# Patient Record
Sex: FEMALE | Race: WHITE | Employment: FULL TIME | URBAN - METROPOLITAN AREA
[De-identification: names, ages, dates, MRNs, and addresses within clinical notes are randomized per-mention and may not be internally consistent; named-entity substitution may affect disease eponyms.]

---

## 2017-09-11 ENCOUNTER — OFFICE VISIT (OUTPATIENT)
Dept: FAMILY MEDICINE CLINIC | Age: 52
End: 2017-09-11
Payer: MEDICARE

## 2017-09-11 VITALS
WEIGHT: 159 LBS | HEART RATE: 83 BPM | OXYGEN SATURATION: 95 % | BODY MASS INDEX: 26.49 KG/M2 | TEMPERATURE: 97.9 F | DIASTOLIC BLOOD PRESSURE: 71 MMHG | HEIGHT: 65 IN | SYSTOLIC BLOOD PRESSURE: 116 MMHG

## 2017-09-11 DIAGNOSIS — J06.9 UPPER RESPIRATORY TRACT INFECTION, UNSPECIFIED TYPE: Primary | ICD-10-CM

## 2017-09-11 DIAGNOSIS — R06.02 SHORTNESS OF BREATH: ICD-10-CM

## 2017-09-11 DIAGNOSIS — R05.9 COUGH: ICD-10-CM

## 2017-09-11 PROCEDURE — 99214 OFFICE O/P EST MOD 30 MIN: CPT | Performed by: FAMILY MEDICINE

## 2017-09-11 RX ORDER — PREDNISONE 50 MG/1
50 TABLET ORAL DAILY
Qty: 7 TABLET | Refills: 0 | Status: SHIPPED | OUTPATIENT
Start: 2017-09-11 | End: 2017-09-18

## 2017-09-11 RX ORDER — FLUTICASONE PROPIONATE 50 MCG
1 SPRAY, SUSPENSION (ML) NASAL 2 TIMES DAILY
Qty: 1 BOTTLE | Refills: 2 | Status: SHIPPED | OUTPATIENT
Start: 2017-09-11 | End: 2019-01-24 | Stop reason: SDUPTHER

## 2017-09-11 RX ORDER — AZITHROMYCIN 250 MG/1
TABLET, FILM COATED ORAL
Qty: 6 TABLET | Refills: 0 | Status: SHIPPED | OUTPATIENT
Start: 2017-09-11 | End: 2017-10-18 | Stop reason: ALTCHOICE

## 2017-09-11 RX ORDER — BROMPHENIRAMINE MALEATE, PSEUDOEPHEDRINE HYDROCHLORIDE, AND DEXTROMETHORPHAN HYDROBROMIDE 2; 30; 10 MG/5ML; MG/5ML; MG/5ML
5 SYRUP ORAL 3 TIMES DAILY
Qty: 180 ML | Refills: 0 | Status: SHIPPED | OUTPATIENT
Start: 2017-09-11 | End: 2017-10-18 | Stop reason: ALTCHOICE

## 2017-09-11 RX ORDER — ALBUTEROL SULFATE 90 UG/1
2 AEROSOL, METERED RESPIRATORY (INHALATION) EVERY 6 HOURS PRN
Qty: 1 INHALER | Refills: 3 | Status: SHIPPED | OUTPATIENT
Start: 2017-09-11 | End: 2018-08-23 | Stop reason: SDUPTHER

## 2017-09-11 ASSESSMENT — ENCOUNTER SYMPTOMS
EYES NEGATIVE: 1
GASTROINTESTINAL NEGATIVE: 1
SHORTNESS OF BREATH: 1
SORE THROAT: 1
SWOLLEN GLANDS: 1
ALLERGIC/IMMUNOLOGIC NEGATIVE: 1
COUGH: 1
SINUS PRESSURE: 1
WHEEZING: 1
RHINORRHEA: 1
TROUBLE SWALLOWING: 0

## 2017-10-14 ENCOUNTER — APPOINTMENT (OUTPATIENT)
Dept: GENERAL RADIOLOGY | Age: 52
End: 2017-10-14
Payer: MEDICARE

## 2017-10-14 ENCOUNTER — HOSPITAL ENCOUNTER (EMERGENCY)
Age: 52
Discharge: HOME | End: 2017-10-14
Attending: EMERGENCY MEDICINE
Payer: MEDICARE

## 2017-10-14 VITALS
OXYGEN SATURATION: 95 % | WEIGHT: 165 LBS | RESPIRATION RATE: 16 BRPM | SYSTOLIC BLOOD PRESSURE: 137 MMHG | DIASTOLIC BLOOD PRESSURE: 79 MMHG | TEMPERATURE: 97.7 F | HEART RATE: 83 BPM | HEIGHT: 65 IN | BODY MASS INDEX: 27.49 KG/M2

## 2017-10-14 DIAGNOSIS — S51.851A DOG BITE OF RIGHT FOREARM, INITIAL ENCOUNTER: Primary | ICD-10-CM

## 2017-10-14 DIAGNOSIS — F17.200 SMOKING ADDICTION: ICD-10-CM

## 2017-10-14 DIAGNOSIS — S51.831A: ICD-10-CM

## 2017-10-14 DIAGNOSIS — S51.811A LACERATION OF RIGHT FOREARM, INITIAL ENCOUNTER: ICD-10-CM
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
## 2017-10-14 PROCEDURE — 73090 X-RAY EXAM OF FOREARM: CPT

## 2017-10-14 PROCEDURE — 6370000000 HC RX 637 (ALT 250 FOR IP): Performed by: EMERGENCY MEDICINE

## 2017-10-14 PROCEDURE — 12013 RPR F/E/E/N/L/M 2.6-5.0 CM: CPT

## 2017-10-14 PROCEDURE — 6360000002 HC RX W HCPCS: Performed by: EMERGENCY MEDICINE

## 2017-10-14 PROCEDURE — 90471 IMMUNIZATION ADMIN: CPT | Performed by: EMERGENCY MEDICINE

## 2017-10-14 PROCEDURE — 2500000003 HC RX 250 WO HCPCS: Performed by: EMERGENCY MEDICINE

## 2017-10-14 PROCEDURE — 99283 EMERGENCY DEPT VISIT LOW MDM: CPT

## 2017-10-14 PROCEDURE — 90715 TDAP VACCINE 7 YRS/> IM: CPT | Performed by: EMERGENCY MEDICINE

## 2017-10-14 RX ORDER — LIDOCAINE HYDROCHLORIDE 10 MG/ML
20 INJECTION, SOLUTION INFILTRATION; PERINEURAL ONCE
Status: COMPLETED | OUTPATIENT
Start: 2017-10-14 | End: 2017-10-14

## 2017-10-14 RX ORDER — AMOXICILLIN AND CLAVULANATE POTASSIUM 875; 125 MG/1; MG/1
1 TABLET, FILM COATED ORAL ONCE
Status: COMPLETED | OUTPATIENT
Start: 2017-10-14 | End: 2017-10-14

## 2017-10-14 RX ORDER — AMOXICILLIN AND CLAVULANATE POTASSIUM 875; 125 MG/1; MG/1
1 TABLET, FILM COATED ORAL 2 TIMES DAILY
Qty: 20 TABLET | Refills: 0 | Status: SHIPPED | OUTPATIENT
Start: 2017-10-14 | End: 2017-10-24

## 2017-10-14 RX ORDER — IBUPROFEN 800 MG/1
800 TABLET ORAL ONCE
Status: COMPLETED | OUTPATIENT
Start: 2017-10-14 | End: 2017-10-14

## 2017-10-14 RX ORDER — IBUPROFEN 600 MG/1
600 TABLET ORAL EVERY 6 HOURS PRN
Qty: 30 TABLET | Refills: 0 | Status: SHIPPED | OUTPATIENT
Start: 2017-10-14 | End: 2018-07-31 | Stop reason: ALTCHOICE

## 2017-10-14 RX ADMIN — CLOSTRIDIUM TETANI TOXOID ANTIGEN (FORMALDEHYDE INACTIVATED), CORYNEBACTERIUM DIPHTHERIAE TOXOID ANTIGEN (FORMALDEHYDE INACTIVATED), BORDETELLA PERTUSSIS TOXOID ANTIGEN (GLUTARALDEHYDE INACTIVATED), BORDETELLA PERTUSSIS FILAMENTOUS HEMAGGLUTININ ANTIGEN (FORMALDEHYDE INACTIVATED), BORDETELLA PERTUSSIS PERTACTIN ANTIGEN, AND BORDETELLA PERTUSSIS FIMBRIAE 2/3 ANTIGEN 0.5 ML: 5; 2; 2.5; 5; 3; 5 INJECTION, SUSPENSION INTRAMUSCULAR at 10:19

## 2017-10-14 RX ADMIN — LIDOCAINE HYDROCHLORIDE 20 ML: 10 INJECTION, SOLUTION INFILTRATION; PERINEURAL at 09:30

## 2017-10-14 RX ADMIN — AMOXICILLIN AND CLAVULANATE POTASSIUM 1 TABLET: 875; 125 TABLET, FILM COATED ORAL at 09:29

## 2017-10-14 RX ADMIN — IBUPROFEN 800 MG: 800 TABLET ORAL at 09:28

## 2017-10-14 ASSESSMENT — ENCOUNTER SYMPTOMS
DIARRHEA: 0
COUGH: 0
BACK PAIN: 0
ABDOMINAL PAIN: 0
SHORTNESS OF BREATH: 0
VOMITING: 0
NAUSEA: 0
SORE THROAT: 0
EYE PAIN: 0

## 2017-10-14 ASSESSMENT — PAIN SCALES - GENERAL
PAINLEVEL_OUTOF10: 3

## 2017-10-14 ASSESSMENT — PAIN DESCRIPTION - ORIENTATION: ORIENTATION: RIGHT

## 2017-10-14 ASSESSMENT — PAIN DESCRIPTION - DESCRIPTORS: DESCRIPTORS: BURNING

## 2017-10-14 ASSESSMENT — PAIN DESCRIPTION - PAIN TYPE: TYPE: ACUTE PAIN

## 2017-10-14 ASSESSMENT — PAIN DESCRIPTION - FREQUENCY: FREQUENCY: CONTINUOUS

## 2017-10-14 ASSESSMENT — PAIN DESCRIPTION - LOCATION: LOCATION: ARM

## 2017-10-14 NOTE — ED NOTES
Faxed animal bite form to [phone] form on chart. Applied triple abx ointment to puncture wounds and sutured lac. Applied gauze and wrapped .  Pt tolerated well     Mayra Alonso RN  10/14/17 4138

## 2017-10-14 NOTE — ED PROVIDER NOTES
[address]  eMERGENCY dEPARTMENT eNCOUnter      Pt Name: Ciarra Grover  MRN: [id number]  [place] 1965  Date of evaluation: 10/14/17      CHIEF COMPLAINT:  Chief Complaint   Patient presents with   [facility] dog       HISTORY OF PRESENT ILLNESS    Ciarra Grover is a 50 y.o. female who presents with Dog bite to right forearm causing puncture wound on the dorsal aspect laceration on the volar aspect and abrasions associated. The dog is patient's own dog and she states that dog's immunizations are up-to-date. She states that she cannot recall how recent her last tetanus shot was but perhaps in the last 10 years. Bleeding is controlled at time of exam.    Right forearm wounds, prior to arrival context is dog bite, quality as skin punctures and lacerations and abrasions, constant duration tract pressures modifying factor for bleeding, moderate severity. REVIEW OF SYSTEMS       Review of Systems   Constitutional: Negative for chills and fever. HENT: Negative for ear pain and sore throat. Eyes: Negative for pain and visual disturbance. Respiratory: Negative for cough and shortness of breath. Cardiovascular: Negative for chest pain. Gastrointestinal: Negative for abdominal pain, diarrhea, nausea and vomiting. Endocrine: Negative for polydipsia and polyuria. Genitourinary: Negative for dysuria, hematuria and vaginal discharge. Musculoskeletal: Negative for arthralgias and back pain. Skin: Negative for rash. Allergic/Immunologic: Negative for food allergies. Neurological: Negative for weakness, numbness and headaches. Hematological: Does not bruise/bleed easily. Psychiatric/Behavioral: Positive for suicidal ideas. Negative for self-injury. The patient is not nervous/anxious. PAST MEDICAL HISTORY   PMH:  has a past medical history of CKD (chronic kidney disease) stage 1, GFR 90 ml/min or greater; Depression;  Hypocalcemia; IBS (irritable bowel syndrome); and Proteinuria. Surgical History:  has a past surgical history that includes  section and Tubal ligation (Bilateral). Social History:  reports that she has been smoking Cigarettes. She has a 28.00 pack-year smoking history. She does not have any smokeless tobacco history on file. She reports that she does not drink alcohol or use drugs. Family History: Noncontributory at this time  Psychiatric History: Noncontributory at this time  Allergies:has No Known Allergies. PHYSICAL EXAM     INITIAL VITALS: /79   Pulse 83   Temp 97.7 °F (36.5 °C) (Oral)   Resp 16   Ht 5' 5\" (1.651 m)   Wt 165 lb (74.8 kg)   LMP 2016   SpO2 95%   BMI 27.46 kg/m²     Physical Exam   Constitutional: She is oriented to person, place, and time. She appears well-developed and well-nourished. HENT:   Head: Normocephalic and atraumatic. Right Ear: External ear normal.   Left Ear: External ear normal.   Nose: Nose normal.   Mouth/Throat: Oropharynx is clear and moist.   Eyes: Conjunctivae and EOM are normal. Pupils are equal, round, and reactive to light. Right eye exhibits no discharge. Left eye exhibits no discharge. Neck: Normal range of motion. Neck supple. No tracheal deviation present. Cardiovascular: Normal rate, regular rhythm, normal heart sounds and intact distal pulses. Exam reveals no gallop and no friction rub. No murmur heard. Pulmonary/Chest: Effort normal and breath sounds normal. No respiratory distress. She has no wheezes. She has no rales. She exhibits no tenderness. Abdominal: Soft. Bowel sounds are normal. She exhibits no distension and no mass. There is no tenderness. There is no rebound and no guarding. Musculoskeletal: Normal range of motion. She exhibits no edema or tenderness. Arms:  Laceration to right volar arm 3 centimeters in length, puncture wound to right dorsum of arm associated with abrasion.    Neurological: She is alert and oriented to person, place, and

## 2017-10-18 ENCOUNTER — OFFICE VISIT (OUTPATIENT)
Dept: FAMILY MEDICINE CLINIC | Age: 52
End: 2017-10-18
Payer: MEDICARE

## 2017-10-18 VITALS
DIASTOLIC BLOOD PRESSURE: 77 MMHG | TEMPERATURE: 98 F | RESPIRATION RATE: 16 BRPM | BODY MASS INDEX: 27.49 KG/M2 | OXYGEN SATURATION: 94 % | SYSTOLIC BLOOD PRESSURE: 122 MMHG | HEART RATE: 82 BPM | WEIGHT: 165 LBS | HEIGHT: 65 IN

## 2017-10-18 DIAGNOSIS — T81.30XA WOUND DEHISCENCE: ICD-10-CM
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
## 2017-10-18 PROCEDURE — 99214 OFFICE O/P EST MOD 30 MIN: CPT | Performed by: FAMILY MEDICINE

## 2017-10-18 PROCEDURE — 12001 RPR S/N/AX/GEN/TRNK 2.5CM/<: CPT | Performed by: FAMILY MEDICINE

## 2017-10-18 ASSESSMENT — ENCOUNTER SYMPTOMS
EYES NEGATIVE: 1
RESPIRATORY NEGATIVE: 1
GASTROINTESTINAL NEGATIVE: 1
ALLERGIC/IMMUNOLOGIC NEGATIVE: 1

## 2017-10-18 NOTE — PROGRESS NOTES
[facility] WALK-IN FAMILY MEDICINE   [address] [address]  Dept: [phone]  Dept Fax: [phone]    Abdirizak Anton is a 50 y.o. female who presents today for her medical conditions/complaints as noted below. Abdirizak Anton is c/o of   Chief Complaint   Patient presents with    Animal Bite     dog bite on saturday, had stitches put in at [facility], 2 have fallen out since then         HPI:     This patient is a 53-year-old white female who presents today with wound dehiscence from a dog bite. Patient states that 2 days ago sutures was placed in her right forearm. There are 3 sutures placed 2 of which came apart yesterday. Patient now has redness and inflammation around the wound and a yellow greenish drainage. She presents for evaluation and treatment of this wound. Patient was given ibuprofen and Augmentin. Her tetanus shots are up-to-date. The dog which bit her was her own and it was an unintentional/unprovoked bite. We will evaluate and treat.         No results found for: LABA1C          ( goal A1C is < 7)   No results found for: LABMICR  LDL Cholesterol (mg/dL)   Date Value   2016 300 (H)       (goal LDL is <100)   AST (U/L)   Date Value   2015 27     ALT (U/L)   Date Value   2015 20     BUN (mg/dL)   Date Value   2016 5 (L)     BP Readings from Last 3 Encounters:   10/18/17 122/77   10/14/17 137/79   17 116/71          (goal 120/80)    Past Medical History:   Diagnosis Date    CKD (chronic kidney disease) stage 1, GFR 90 ml/min or greater     Depression     Hypocalcemia     IBS (irritable bowel syndrome)     Proteinuria       Past Surgical History:   Procedure Laterality Date     SECTION      CS x 2    TUBAL LIGATION Bilateral        Family History   Problem Relation Age of Onset    Asthma Father     Cancer Father      colon    Diabetes Father     COPD Father     Heart Failure Father

## 2018-07-31 ENCOUNTER — OFFICE VISIT (OUTPATIENT)
Dept: FAMILY MEDICINE CLINIC | Age: 53
End: 2018-07-31
Payer: MEDICARE

## 2018-07-31 VITALS
WEIGHT: 155 LBS | RESPIRATION RATE: 16 BRPM | SYSTOLIC BLOOD PRESSURE: 107 MMHG | OXYGEN SATURATION: 98 % | TEMPERATURE: 98.2 F | HEART RATE: 80 BPM | BODY MASS INDEX: 28.52 KG/M2 | HEIGHT: 62 IN | DIASTOLIC BLOOD PRESSURE: 67 MMHG

## 2018-07-31 DIAGNOSIS — T30.0 BURN: Primary | ICD-10-CM

## 2018-07-31 DIAGNOSIS — E78.2 MIXED HYPERLIPIDEMIA: ICD-10-CM

## 2018-07-31 DIAGNOSIS — E07.9 SWELLING OF THYROID GLAND: ICD-10-CM

## 2018-07-31 PROCEDURE — 99213 OFFICE O/P EST LOW 20 MIN: CPT | Performed by: FAMILY MEDICINE

## 2018-07-31 PROCEDURE — 4004F PT TOBACCO SCREEN RCVD TLK: CPT | Performed by: FAMILY MEDICINE

## 2018-07-31 PROCEDURE — G8419 CALC BMI OUT NRM PARAM NOF/U: HCPCS | Performed by: FAMILY MEDICINE

## 2018-07-31 PROCEDURE — G8427 DOCREV CUR MEDS BY ELIG CLIN: HCPCS | Performed by: FAMILY MEDICINE

## 2018-07-31 PROCEDURE — 3017F COLORECTAL CA SCREEN DOC REV: CPT | Performed by: FAMILY MEDICINE

## 2018-07-31 RX ORDER — IBUPROFEN 800 MG/1
800 TABLET ORAL EVERY 8 HOURS PRN
Qty: 10 TABLET | Refills: 0 | Status: SHIPPED | OUTPATIENT
Start: 2018-07-31 | End: 2018-08-23

## 2018-07-31 RX ORDER — FLUCONAZOLE 150 MG/1
150 TABLET ORAL ONCE
Qty: 1 TABLET | Refills: 0 | Status: SHIPPED | OUTPATIENT
Start: 2018-07-31 | End: 2018-07-31

## 2018-07-31 RX ORDER — CEPHALEXIN 500 MG/1
500 CAPSULE ORAL 3 TIMES DAILY
Qty: 30 CAPSULE | Refills: 0 | Status: SHIPPED | OUTPATIENT
Start: 2018-07-31 | End: 2018-08-10

## 2018-07-31 ASSESSMENT — ENCOUNTER SYMPTOMS
ROS SKIN COMMENTS: RIGHT THIGH.
EYES NEGATIVE: 1
NAUSEA: 0
WHEEZING: 0
BACK PAIN: 0
COLOR CHANGE: 1
BURN: 1
CHEST TIGHTNESS: 0
DIARRHEA: 0
ABDOMINAL PAIN: 0
COUGH: 0
SHORTNESS OF BREATH: 0

## 2018-07-31 NOTE — PATIENT INSTRUCTIONS
of infection. If a blister breaks open by itself, blot up the liquid, and leave the skin that covered the blister. This helps protect the new skin. · If your doctor prescribed antibiotics, take them as directed. Do not stop taking them just because you feel better. You need to take the full course of antibiotics. For pain and itching  · Take pain medicines exactly as directed. ¨ If the doctor gave you a prescription medicine for pain, take it as prescribed. ¨ If you are not taking a prescription pain medicine, ask your doctor if you can take an over-the-counter medicine. · If the burn itches, try not to scratch it. Try an over-the-counter antihistamine such as diphenhydramine (Benadryl) or loratadine (Claritin). Read and follow all instructions on the label. When should you call for help? Call your doctor now or seek immediate medical care if:    · Your pain gets worse.     · You have symptoms of infection, such as:  ¨ Increased pain, swelling, warmth, or redness near the burn. ¨ Red streaks leading from the burn. ¨ Pus draining from the burn. ¨ A fever.    Watch closely for changes in your health, and be sure to contact your doctor if:    · You do not get better as expected. Where can you learn more? Go to [email or web address]://Modern Armory.[email or web address] org and sign in to your Bluetest account. Enter B672 in the [facility] box to learn more about \"Burns: Care Instructions. \"     If you do not have an account, please click on the \"Sign Up Now\" link. Current as of: March 20, 2017  Content Version: 11.6  © 7950-5609 [employer], [employer]. Care instructions adapted under license by [facility] ([facility]). If you have questions about a medical condition or this instruction, always ask your healthcare professional. [place] [address] any warranty or liability for your use of this information.        Patient Education            Current as of: March 20, 2017  Content Version: 11.6  © 3736-1103 Healthwise, Incorporated. Care instructions adapted under license by [facility] ([facility]). If you have questions about a medical condition or this instruction, always ask your healthcare professional. [address] any warranty or liability for your use of this information. Patient Education        High Cholesterol: Care Instructions  Your Care Instructions    Cholesterol is a type of fat in your blood. It is needed for many body functions, such as making new cells. Cholesterol is made by your body. It also comes from food you eat. High cholesterol means that you have too much of the fat in your blood. This raises your risk of a heart attack and stroke. LDL and HDL are part of your total cholesterol. LDL is the \"bad\" cholesterol. High LDL can raise your risk for heart disease, heart attack, and stroke. HDL is the \"good\" cholesterol. It helps clear bad cholesterol from the body. High HDL is linked with a lower risk of heart disease, heart attack, and stroke. Your cholesterol levels help your doctor find out your risk for having a heart attack or stroke. You and your doctor can talk about whether you need to lower your risk and what treatment is best for you. A heart-healthy lifestyle along with medicines can help lower your cholesterol and your risk. The way you choose to lower your risk will depend on how high your risk is for heart attack and stroke. It will also depend on how you feel about taking medicines. Follow-up care is a key part of your treatment and safety. Be sure to make and go to all appointments, and call your doctor if you are having problems. It's also a good idea to know your test results and keep a list of the medicines you take. How can you care for yourself at home? · Eat a variety of foods every day.  Good choices include fruits, vegetables, whole grains (like oatmeal), dried beans and peas, nuts and seeds, soy products (like tofu), and fat-free or low-fat dairy more?  Go to https://chpepiceweb.[email or web address] org and sign in to your Relationship Sciencet account. Enter J045 in the [facility] box to learn more about \"Thyroid Nodules: Care Instructions. \"     If you do not have an account, please click on the \"Sign Up Now\" link. Current as of: June 22, 2017  Content Version: 11.6  © 6637-3012 [employer]. Care instructions adapted under license by [facility] [employer] [facility] ([facility]). If you have questions about a medical condition or this instruction, always ask your healthcare professional. [address] any warranty or liability for your use of this information. Patient Education        Thyroid Gland: Anatomy Sketch    Current as of: May 12, 2017  Content Version: 11.6  © 7500-5742 [employer]. Care instructions adapted under license by [facility] [employer] [facility] ([facility]). If you have questions about a medical condition or this instruction, always ask your healthcare professional. [address] any warranty or liability for your use of this information. Patient Education            Current as of: October 5, 2017  Content Version: 11.6  © 2406-8136 [employer]. Care instructions adapted under license by [facility] ([facility]). If you have questions about a medical condition or this instruction, always ask your healthcare professional. [address] any warranty or liability for your use of this information. Please follow up here in 7-10 days after tests if not able to see your PCP.   PRACTICE NAME STREET  ADDRESS OFFICE  PHONE   [facility]                                                                       [address] [facility]                                                                        [address], [address]   [address] [place], [facility]   [address]   [address] [facility]                                                                                       [address] [facility], [address] [facility]  [address], [address]   [address]                                                                                [address] [place]  [address]  [place], [facility] [address]   [facility], [address]   General acute hospital                                                                       [address], [address] [facility]                                                                   [address] [facility], [address]   [facility]                                                                 [address] [facility], [address] [place] [address]                                                                [address]    [facility] [address] [place] [address], [address] [place] [address] [facility]                                                                         [address]   [facility], [address]   [facility]
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
## 2018-08-08 ENCOUNTER — HOSPITAL ENCOUNTER (OUTPATIENT)
Dept: ULTRASOUND IMAGING | Age: 53
Discharge: HOME | End: 2018-08-10
Payer: MEDICARE

## 2018-08-08 ENCOUNTER — HOSPITAL ENCOUNTER (OUTPATIENT)
Age: 53
Discharge: HOME | End: 2018-08-08
Payer: MEDICARE

## 2018-08-08 DIAGNOSIS — E07.9 SWELLING OF THYROID GLAND: ICD-10-CM

## 2018-08-08 LAB
ABSOLUTE EOS #: 0.2 K/UL (ref 0–0.4)
ABSOLUTE IMMATURE GRANULOCYTE: ABNORMAL K/UL (ref 0–0.3)
ABSOLUTE LYMPH #: 2.8 K/UL (ref 1–4.8)
ABSOLUTE MONO #: 0.6 K/UL (ref 0.1–1.3)
ANION GAP SERPL CALCULATED.3IONS-SCNC: 14 MMOL/L (ref 9–17)
BASOPHILS # BLD: 1 % (ref 0–2)
BASOPHILS ABSOLUTE: 0.1 K/UL (ref 0–0.2)
BUN BLDV-MCNC: 11 MG/DL (ref 6–20)
BUN/CREAT BLD: NORMAL (ref 9–20)
CALCIUM SERPL-MCNC: 9.2 MG/DL (ref 8.6–10.4)
CHLORIDE BLD-SCNC: 99 MMOL/L (ref 98–107)
CHOLESTEROL/HDL RATIO: 6.4
CHOLESTEROL: 303 MG/DL
CO2: 25 MMOL/L (ref 20–31)
CREAT SERPL-MCNC: 0.65 MG/DL (ref 0.5–0.9)
DIFFERENTIAL TYPE: ABNORMAL
EOSINOPHILS RELATIVE PERCENT: 3 % (ref 0–4)
GFR AFRICAN AMERICAN: >60 ML/MIN
GFR NON-AFRICAN AMERICAN: >60 ML/MIN
GFR SERPL CREATININE-BSD FRML MDRD: NORMAL ML/MIN/{1.73_M2}
GFR SERPL CREATININE-BSD FRML MDRD: NORMAL ML/MIN/{1.73_M2}
GLUCOSE BLD-MCNC: 95 MG/DL (ref 70–99)
HCT VFR BLD CALC: 47.7 % (ref 36–46)
HDLC SERPL-MCNC: 47 MG/DL
HEMOGLOBIN: 16.5 G/DL (ref 12–16)
IMMATURE GRANULOCYTES: ABNORMAL %
LDL CHOLESTEROL: 224 MG/DL (ref 0–130)
LYMPHOCYTES # BLD: 38 % (ref 24–44)
MCH RBC QN AUTO: 29.5 PG (ref 26–34)
MCHC RBC AUTO-ENTMCNC: 34.6 G/DL (ref 31–37)
MCV RBC AUTO: 85.3 FL (ref 80–100)
MONOCYTES # BLD: 8 % (ref 1–7)
NRBC AUTOMATED: ABNORMAL PER 100 WBC
PDW BLD-RTO: 14.4 % (ref 11.5–14.9)
PLATELET # BLD: 385 K/UL (ref 150–450)
PLATELET ESTIMATE: ABNORMAL
PMV BLD AUTO: 7.6 FL (ref 6–12)
POTASSIUM SERPL-SCNC: 4.8 MMOL/L (ref 3.7–5.3)
RBC # BLD: 5.59 M/UL (ref 4–5.2)
RBC # BLD: ABNORMAL 10*6/UL
SEG NEUTROPHILS: 50 % (ref 36–66)
SEGMENTED NEUTROPHILS ABSOLUTE COUNT: 3.8 K/UL (ref 1.3–9.1)
SODIUM BLD-SCNC: 138 MMOL/L (ref 135–144)
TRIGL SERPL-MCNC: 162 MG/DL
TSH SERPL DL<=0.05 MIU/L-ACNC: 0.64 MIU/L (ref 0.3–5)
VLDLC SERPL CALC-MCNC: ABNORMAL MG/DL (ref 1–30)
WBC # BLD: 7.5 K/UL (ref 3.5–11)
WBC # BLD: ABNORMAL 10*3/UL

## 2018-08-08 PROCEDURE — 36415 COLL VENOUS BLD VENIPUNCTURE: CPT

## 2018-08-08 PROCEDURE — 76536 US EXAM OF HEAD AND NECK: CPT

## 2018-08-08 PROCEDURE — 80061 LIPID PANEL: CPT

## 2018-08-08 PROCEDURE — 85025 COMPLETE CBC W/AUTO DIFF WBC: CPT

## 2018-08-08 PROCEDURE — 84443 ASSAY THYROID STIM HORMONE: CPT

## 2018-08-08 PROCEDURE — 80048 BASIC METABOLIC PNL TOTAL CA: CPT

## 2018-08-09 DIAGNOSIS — E04.1 THYROID NODULE: Primary | ICD-10-CM

## 2018-08-09 DIAGNOSIS — E78.5 HYPERLIPIDEMIA, UNSPECIFIED HYPERLIPIDEMIA TYPE: Primary | ICD-10-CM

## 2018-08-09 RX ORDER — ATORVASTATIN CALCIUM 20 MG/1
20 TABLET, FILM COATED ORAL DAILY
Qty: 15 TABLET | Refills: 0 | Status: SHIPPED | OUTPATIENT
Start: 2018-08-09 | End: 2018-08-13 | Stop reason: SDUPTHER

## 2018-08-13 ENCOUNTER — OFFICE VISIT (OUTPATIENT)
Dept: FAMILY MEDICINE CLINIC | Age: 53
End: 2018-08-13
Payer: MEDICARE

## 2018-08-13 VITALS
HEART RATE: 70 BPM | HEIGHT: 65 IN | SYSTOLIC BLOOD PRESSURE: 124 MMHG | OXYGEN SATURATION: 100 % | TEMPERATURE: 98 F | WEIGHT: 157.2 LBS | DIASTOLIC BLOOD PRESSURE: 64 MMHG | BODY MASS INDEX: 26.19 KG/M2

## 2018-08-13 DIAGNOSIS — Z87.891 PERSONAL HISTORY OF SMOKING: ICD-10-CM

## 2018-08-13 DIAGNOSIS — K58.1 IRRITABLE BOWEL SYNDROME WITH CONSTIPATION: ICD-10-CM

## 2018-08-13 DIAGNOSIS — J44.9 CHRONIC OBSTRUCTIVE PULMONARY DISEASE, UNSPECIFIED COPD TYPE (HCC): ICD-10-CM

## 2018-08-13 DIAGNOSIS — Z80.0 FAMILY HISTORY OF COLON CANCER: ICD-10-CM

## 2018-08-13 DIAGNOSIS — E78.2 MIXED HYPERLIPIDEMIA: ICD-10-CM

## 2018-08-13 DIAGNOSIS — Z12.31 ENCOUNTER FOR SCREENING MAMMOGRAM FOR BREAST CANCER: ICD-10-CM

## 2018-08-13 DIAGNOSIS — Z23 NEED FOR PROPHYLACTIC VACCINATION AND INOCULATION AGAINST VARICELLA: ICD-10-CM

## 2018-08-13 DIAGNOSIS — R73.9 HYPERGLYCEMIA: ICD-10-CM

## 2018-08-13 DIAGNOSIS — Z12.11 SCREENING FOR COLON CANCER: ICD-10-CM

## 2018-08-13 DIAGNOSIS — Z23 NEED FOR PROPHYLACTIC VACCINATION AGAINST STREPTOCOCCUS PNEUMONIAE (PNEUMOCOCCUS): ICD-10-CM

## 2018-08-13 DIAGNOSIS — E04.1 THYROID NODULE: Primary | ICD-10-CM

## 2018-08-13 PROCEDURE — G8926 SPIRO NO PERF OR DOC: HCPCS | Performed by: FAMILY MEDICINE

## 2018-08-13 PROCEDURE — 3017F COLORECTAL CA SCREEN DOC REV: CPT | Performed by: FAMILY MEDICINE

## 2018-08-13 PROCEDURE — 4004F PT TOBACCO SCREEN RCVD TLK: CPT | Performed by: FAMILY MEDICINE

## 2018-08-13 PROCEDURE — G8427 DOCREV CUR MEDS BY ELIG CLIN: HCPCS | Performed by: FAMILY MEDICINE

## 2018-08-13 PROCEDURE — G8419 CALC BMI OUT NRM PARAM NOF/U: HCPCS | Performed by: FAMILY MEDICINE

## 2018-08-13 PROCEDURE — 90471 IMMUNIZATION ADMIN: CPT | Performed by: FAMILY MEDICINE

## 2018-08-13 PROCEDURE — 3023F SPIROM DOC REV: CPT | Performed by: FAMILY MEDICINE

## 2018-08-13 PROCEDURE — 90732 PPSV23 VACC 2 YRS+ SUBQ/IM: CPT | Performed by: FAMILY MEDICINE

## 2018-08-13 PROCEDURE — 99204 OFFICE O/P NEW MOD 45 MIN: CPT | Performed by: FAMILY MEDICINE

## 2018-08-13 RX ORDER — ATORVASTATIN CALCIUM 40 MG/1
40 TABLET, FILM COATED ORAL DAILY
Qty: 30 TABLET | Refills: 3 | Status: SHIPPED | OUTPATIENT
Start: 2018-08-13 | End: 2018-12-26 | Stop reason: SDUPTHER

## 2018-08-13 RX ORDER — ASPIRIN 81 MG/1
81 TABLET ORAL DAILY
Qty: 30 TABLET | Refills: 3 | Status: SHIPPED | OUTPATIENT
Start: 2018-08-13 | End: 2018-12-26 | Stop reason: SDUPTHER

## 2018-08-13 RX ORDER — NICOTINE 21 MG/24HR
1 PATCH, TRANSDERMAL 24 HOURS TRANSDERMAL EVERY 24 HOURS
Qty: 30 PATCH | Refills: 3 | Status: SHIPPED | OUTPATIENT
Start: 2018-08-13 | End: 2019-06-06 | Stop reason: SDUPTHER

## 2018-08-13 RX ORDER — POLYETHYLENE GLYCOL 3350 17 G/17G
17 POWDER, FOR SOLUTION ORAL DAILY
Qty: 510 G | Refills: 3 | Status: SHIPPED | OUTPATIENT
Start: 2018-08-13 | End: 2019-11-11 | Stop reason: SDUPTHER

## 2018-08-13 ASSESSMENT — ENCOUNTER SYMPTOMS
BLOOD IN STOOL: 0
PHOTOPHOBIA: 0
COUGH: 0
ABDOMINAL PAIN: 0
CONSTIPATION: 0
COLOR CHANGE: 1
VOICE CHANGE: 0
RECTAL PAIN: 0
SINUS PRESSURE: 0
NAUSEA: 0
SHORTNESS OF BREATH: 1
BACK PAIN: 0
WHEEZING: 0
DIARRHEA: 0

## 2018-08-13 ASSESSMENT — PATIENT HEALTH QUESTIONNAIRE - PHQ9
SUM OF ALL RESPONSES TO PHQ QUESTIONS 1-9: 0
SUM OF ALL RESPONSES TO PHQ QUESTIONS 1-9: 0
2. FEELING DOWN, DEPRESSED OR HOPELESS: 0
1. LITTLE INTEREST OR PLEASURE IN DOING THINGS: 0
SUM OF ALL RESPONSES TO PHQ9 QUESTIONS 1 & 2: 0

## 2018-08-13 NOTE — PROGRESS NOTES
Psychiatric/Behavioral: Negative for agitation, behavioral problems, confusion, decreased concentration, hallucinations and sleep disturbance. The patient is not nervous/anxious and is not hyperactive.          -vital signs stable and within normal limits except   /64   Pulse 70   Temp 98 °F (36.7 °C) (Tympanic)   Ht 5' 5\" (1.651 m)   Wt 157 lb 3.2 oz (71.3 kg)   LMP 04/16/2016   SpO2 100%   BMI 26.16 kg/m²        Physical Exam  PHYSICAL EXAM:   VITALS:   Vitals:    08/13/18 1059   BP: 124/64   Pulse: 70   Temp: 98 °F (36.7 °C)   SpO2: 100%     GENERAL:  Patient is a well-developed, well-nourished female  in no acute distress, alert and oriented x3, appropriate and pleasant conversation. HEAD: Normocephalic, atraumatic. EYES: Pupils equal, round and reactive to light and accommodation, extraocular   movements intact. ENT: Moist mucous membranes. No erythema is noted. Thyromegaly present  NECK: Supple. No masses. No lymphadenopathy. CARDIOVASCULAR: Regular rate and rhythm. PULMONARY: Lungs are clear to auscultation bilaterally. ABDOMEN: Soft, nontender, nondistended. Positive bowel sounds. MUSCULOSKELETAL: Strength 5/5 bilaterally in all extremities. No tenderness to   palpation of the ribs, long bones, or spine. Skin: There is erythematous rash, improving on right thigh with small scab. NEUROLOGIC: Cranial nerves II through XII grossly intact. No focal deficits are noted.     Most recent labs reviewed with patient:    Lab Results   Component Value Date    WBC 7.5 08/08/2018    HGB 16.5 (H) 08/08/2018    HCT 47.7 (H) 08/08/2018    MCV 85.3 08/08/2018     08/08/2018       Lab Results   Component Value Date     08/08/2018    K 4.8 08/08/2018    CL 99 08/08/2018    CO2 25 08/08/2018    BUN 11 08/08/2018    CREATININE 0.65 08/08/2018    GLUCOSE 95 08/08/2018    CALCIUM 9.2 08/08/2018        Lab Results   Component Value Date    ALT 20 06/24/2015    AST 27 06/24/2015 instructions  Was a self-tracking handout given in paper form or via [employer]? Yes    Requested Prescriptions     Signed Prescriptions Disp Refills    zoster recombinant adjuvanted vaccine (SHINGRIX) 50 MCG SUSR injection 1 each 1     Sig: Inject 0.5 mLs into the muscle once for 1 dose 50 MCG IM then repeat 2-6 months.  atorvastatin (LIPITOR) 40 MG tablet 30 tablet 3     Sig: Take 1 tablet by mouth daily    aspirin EC 81 MG EC tablet 30 tablet 3     Sig: Take 1 tablet by mouth daily    nicotine (NICODERM CQ) 14 MG/24HR 30 patch 3     Sig: Place 1 patch onto the skin every 24 hours       All patient questions answered. Patient voiced understanding. Quality Measures    Body mass index is 26.16 kg/m². Elevated. Weight control planned discussed Healthy diet and regular exercise. BP: 124/64 Blood pressure is normal. Treatment plan consists of No treatment change needed. Lab Results   Component Value Date    TQVEBUADFZWONI 343 (H) 08/08/2018    (goal LDL reduction with dx if diabetes is 50% LDL reduction)      PHQ Scores 8/13/2018   PHQ2 Score 0   PHQ9 Score 0     Interpretation of Total Score Depression Severity: 1-4 = Minimal depression, 5-9 = Mild depression, 10-14 = Moderate depression, 15-19 = Moderately severe depression, 20-27 = Severe depression      The patient's past medical, surgical, social, and family history as well as her   current medications and allergies were reviewed as documented in today's encounter. Medications, labs, diagnostic studies, consultations and follow-up as documented in this encounter. Return in about 4 weeks (around 9/10/2018) for pft results, fit test results, lab work, copd, thyroid biopsy . Patient was seen with total face to face time of 45 minutes. More than 50% of this visit was counseling and education.        Future Appointments  Date Time Provider Tiki Lewis   8/23/2018 10:00 AM Álvaro Ruff MD [facility]   8/27/2018 7:30 AM STV PFT  1 [facility]

## 2018-08-13 NOTE — PROGRESS NOTES
Visit Information    Have you changed or started any medications since your last visit including any over-the-counter medicines, vitamins, or herbal medicines? no   Are you having any side effects from any of your medications? -  no  Have you stopped taking any of your medications? Is so, why? -  no    Have you seen any other physician or provider since your last visit? Yes - Records Obtained  Have you had any other diagnostic tests since your last visit? No  Have you been seen in the emergency room and/or had an admission to a hospital since we last saw you? No  Have you had your routine dental cleaning in the past 6 months? no    Have you activated your [employer] account? If not, what are your barriers?  Yes     Patient Care Team:  Roque Torres MD as PCP - General (Family Medicine)    Medical History Review  Past Medical, Family, and Social History reviewed and does contribute to the patient presenting condition    Health Maintenance   Topic Date Due    HIV screen  02/14/1982    Pneumococcal med risk (1 of 1 - PPSV23) 02/14/1986    Cervical cancer screen  02/14/1988    Breast cancer screen  02/14/2017    Shingles Vaccine (1 of 2 - 2 Dose Series) 02/14/2017    Colon cancer screen colonoscopy  02/14/2017    Flu vaccine (1) 09/01/2018    Lipid screen  08/08/2023    DTaP/Tdap/Td vaccine (2 - Td) 10/14/2027

## 2018-08-23 ENCOUNTER — HOSPITAL ENCOUNTER (OUTPATIENT)
Age: 53
Setting detail: SPECIMEN
Discharge: HOME | End: 2018-08-23
Payer: MEDICARE

## 2018-08-23 ENCOUNTER — OFFICE VISIT (OUTPATIENT)
Dept: FAMILY MEDICINE CLINIC | Age: 53
End: 2018-08-23
Payer: MEDICARE

## 2018-08-23 ENCOUNTER — HOSPITAL ENCOUNTER (OUTPATIENT)
Age: 53
Discharge: HOME | End: 2018-08-23
Payer: MEDICARE

## 2018-08-23 VITALS
HEIGHT: 65 IN | HEART RATE: 78 BPM | TEMPERATURE: 97 F | SYSTOLIC BLOOD PRESSURE: 123 MMHG | WEIGHT: 158.6 LBS | BODY MASS INDEX: 26.42 KG/M2 | OXYGEN SATURATION: 97 % | DIASTOLIC BLOOD PRESSURE: 79 MMHG

## 2018-08-23 DIAGNOSIS — Z12.4 PAP SMEAR FOR CERVICAL CANCER SCREENING: Primary | ICD-10-CM

## 2018-08-23 DIAGNOSIS — E04.1 THYROID NODULE: ICD-10-CM

## 2018-08-23 DIAGNOSIS — R19.7 DIARRHEA, UNSPECIFIED TYPE: ICD-10-CM

## 2018-08-23 DIAGNOSIS — Z23 NEED FOR PROPHYLACTIC VACCINATION AND INOCULATION AGAINST VARICELLA: ICD-10-CM

## 2018-08-23 DIAGNOSIS — R05.9 COUGH: ICD-10-CM

## 2018-08-23 DIAGNOSIS — R73.9 HYPERGLYCEMIA: ICD-10-CM

## 2018-08-23 LAB
ESTIMATED AVERAGE GLUCOSE: 120 MG/DL
HBA1C MFR BLD: 5.8 % (ref 4–6)
HIV AG/AB: NONREACTIVE

## 2018-08-23 PROCEDURE — 83036 HEMOGLOBIN GLYCOSYLATED A1C: CPT

## 2018-08-23 PROCEDURE — 87389 HIV-1 AG W/HIV-1&-2 AB AG IA: CPT

## 2018-08-23 PROCEDURE — 36415 COLL VENOUS BLD VENIPUNCTURE: CPT

## 2018-08-23 RX ORDER — ALBUTEROL SULFATE 90 UG/1
2 AEROSOL, METERED RESPIRATORY (INHALATION) EVERY 6 HOURS PRN
Qty: 1 INHALER | Refills: 3 | Status: SHIPPED | OUTPATIENT
Start: 2018-08-23 | End: 2019-01-07 | Stop reason: SDUPTHER

## 2018-08-23 ASSESSMENT — ENCOUNTER SYMPTOMS
DIARRHEA: 1
PHOTOPHOBIA: 0
ABDOMINAL PAIN: 0
WHEEZING: 0
VOMITING: 0
CONSTIPATION: 0
ANAL BLEEDING: 0
SHORTNESS OF BREATH: 1
COLOR CHANGE: 0
COUGH: 0

## 2018-08-23 NOTE — PROGRESS NOTES
Chief Complaint   Patient presents with    Gynecologic Exam    Shortness of Breath        Here for annual exam. Also, Gynecologic Exam and Shortness of Breath    Patient is here for Pap, no history of discharge, denies any UTI symptoms. Patient is complaining of diarrhea from last few days, reports that's improving denies any abdominal pain nausea vomiting. She is feeling fatigued and tired. Patient had history of anal stenosis as a childhood and her anus has been dilated. Patient reports she cannot hold stools. Urinary symptoms:none   Sexually active: no     Last mammogram:  The patient has  nno  family history of breast cancer      Wears seatbelts: yes   last pap: was normal    Regular exercise: yes  Ever been transfused or tattooed?: no  The patient reports that domestic violence in her life is absent. Last eye exam:  Hearing:  Last dental exam and preventative dental cleaning:    Nutritional assessment: Body mass index is 26.39 kg/m².   Wt Readings from Last 3 Encounters:   18 158 lb 9.6 oz (71.9 kg)   18 157 lb 3.2 oz (71.3 kg)   18 155 lb (70.3 kg)       Healthful diet and Physical activity counseling to prevent CVD- low carb, low fat diet, increase fruits and vegetables, and exercise 4-5 times a day 30-40 minutes a day discussed      Tobacco use screening:    No Lung cancer screenin-81 yo, 30 pack-year currently smoking or have quit within the past 15 years:     Alcohol use:    Immunization History   Administered Date(s) Administered    Pneumococcal Polysaccharide ([id number]) 2018    Tdap (Boostrix, Adacel) 10/14/2017        yes Tdap one time, then Td every 10 years  Influenza annually    PPSV 23 in all adults 19-63 yo with chronic conditions, smokers, alcoholism,  nursing home residents; then PCV 13 at 73 yo.      yes Menopause    Colonoscopy recommended at 49 yo  The patient has  No  family history of colon cancer    BP screening  BP Readings from Last 3 Encounters:   08/23/18 123/79   08/13/18 124/64   07/31/18 107/67       Pulse Readings from Last 3 Encounters:   08/23/18 78   08/13/18 70   07/31/18 80       Other symptoms:   Other concerns:       no Diabetes mellitus type 2 screening if sustained /80   no Personal history of gestational diabetes   No results found for: LABA1C       done Lipid screening -women 39 or older, increased risk of CHD    Lab Results   Component Value Date    CHOL 303 (H) 08/08/2018    CHOL 400 (H) 06/23/2016     Lab Results   Component Value Date    TRIG 162 (H) 08/08/2018    TRIG 252 (H) 06/23/2016     Lab Results   Component Value Date    HDL 47 08/08/2018    HDL 50 06/23/2016     Lab Results   Component Value Date    LDLCHOLESTEROL 224 (H) 08/08/2018    LDLCHOLESTEROL 300 (H) 06/23/2016     Lab Results   Component Value Date    VLDL NOT REPORTED 08/08/2018    VLDL NOT REPORTED 06/23/2016     Lab Results   Component Value Date    CHOLHDLRATIO 6.4 (H) 08/08/2018    CHOLHDLRATIO 8.0 (H) 06/23/2016        Aspirin to prevent CVD-women 55-79 to prevent ischemic strokes  The 10-year ASCVD risk score (Pradeep Moffett, et al., 2013) is: 7%    Values used to calculate the score:      Age: 48 years      Sex: Female      Is Non- : No      Diabetic: No      Tobacco smoker: Yes      Systolic Blood Pressure: 152 mmHg      Is BP treated: No      HDL Cholesterol: 47 mg/dL      Total Cholesterol: 303 mg/dL    No Hepatitis C screening-adults at high risk and adults born between 9490-9633  No results found for: HAV, HEPAIGM, HEPBIGM, HEPBCAB, HBEAG, HEPCAB     no DEXA at 73 yo     no Fall screening-any fall in the past 1 year    PHQ Scores 8/13/2018   PHQ2 Score 0   PHQ9 Score 0     Interpretation of Total Score Depression Severity: 1-4 = Minimal depression, 5-9 = Mild depression, 10-14 = Moderate depression, 15-19 = Moderately severe depression, 20-27 = Severe depression    Health Maintenance reviewed - mammogram ordered, patient to schedule appointment.       Patient Active Problem List    Diagnosis Date Noted    Thyroid nodule 2018    Chronic obstructive pulmonary disease ([address] 2018    Family history of colon cancer 2018    Personal history of smoking 2018    Mixed hyperlipidemia 2018    Hematuria 2016    IBS (irritable bowel syndrome)     Depression     Proteinuria     Hypocalcemia     CKD (chronic kidney disease) stage 1, GFR 90 ml/min or greater          Past Medical History:   Diagnosis Date    Chronic kidney disease     CKD (chronic kidney disease) stage 1, GFR 90 ml/min or greater     COPD (chronic obstructive pulmonary disease) (HCC)     Depression     Hyperlipidemia     Hypocalcemia     IBS (irritable bowel syndrome)     Proteinuria      Past Surgical History:   Procedure Laterality Date     SECTION      CS x 2    TUBAL LIGATION Bilateral      Family History   Problem Relation Age of Onset    Asthma Father     Cancer Father         colon    Diabetes Father     COPD Father     Heart Failure Father     Seizures Mother      Social History   Substance Use Topics    Smoking status: Current Every Day Smoker     Packs/day: 1.00     Years: 28.00     Types: Cigarettes    Smokeless tobacco: Never Used      Comment: down to 5 cigs per day    Alcohol use No         Current Outpatient Prescriptions   Medication Sig Dispense Refill    albuterol sulfate HFA (VENTOLIN HFA) 108 (90 Base) MCG/ACT inhaler Inhale 2 puffs into the lungs every 6 hours as needed for Wheezing 1 Inhaler 3    atorvastatin (LIPITOR) 40 MG tablet Take 1 tablet by mouth daily 30 tablet 3    aspirin EC 81 MG EC tablet Take 1 tablet by mouth daily 30 tablet 3    nicotine (NICODERM CQ) 14 MG/24HR Place 1 patch onto the skin every 24 hours 30 patch 3    Blood Pressure Monitor KIT TAKE BLOOD PRESSURE DAILY 1 kit 0    polyethylene glycol (MIRALAX) powder Take 17 g by mouth daily 510 g 3    and accommodation, extraocular   movements intact. ENT: Moist mucous membranes. No erythema is noted. NECK: Supple. No masses. No lymphadenopathy. CARDIOVASCULAR: Regular rate and rhythm. PULMONARY: Lungs are clear to auscultation bilaterally. ABDOMEN: Soft, nontender, nondistended. Positive bowel sounds. Genital exam: Cervix is small, non-discharge seen, no erosions seen bilateral fornices are normal.  NEUROLOGIC: Cranial nerves II through XII grossly intact. No focal deficits are noted. ASSESSMENT AND PLAN      1. Pap smear for cervical cancer screening  -Pap done  - PAP Smear; Future        3. Diarrhea, unspecified type  Symptoms has improved. 4. Cough  -Patient has been evaluated and has PFT and other testing ordered. - albuterol sulfate HFA (VENTOLIN HFA) 108 (90 Base) MCG/ACT inhaler; Inhale 2 puffs into the lungs every 6 hours as needed for Wheezing  Dispense: 1 Inhaler; Refill: 3       Education Reviewed and Recommended: Smoking Cessation, Calcium Supplements, Weight Bearing Exercise, Low Fat, Low Cholesterol Diet   Orders Placed This Encounter   Procedures    PAP Smear     Patient History:    Patient's last menstrual period was 2016. OBGYN Status: Postmenopausal  Past Surgical History:  No date:  SECTION      Comment: CS x 2  No date: TUBAL LIGATION Bilateral      Smoking status: Current Every Day Smoker                                                   Packs/day: 1.00      Years: 28.00        Types: Cigarettes  Smokeless tobacco: Never Used                      Comment: down to 5 cigs per day       Standing Status:   Future     Standing Expiration Date:   2019     Order Specific Question:   Collection Type     Answer: Thin Prep     Order Specific Question:   Prior Abnormal Pap Test     Answer:   No     Order Specific Question:   Screening or Diagnostic     Answer:   Screening     Order Specific Question:   HPV Requested?      Answer:   Yes - If Abnormal Reflex HPV Order Specific Question:   High Risk Patient     Answer:   N/A       Medications Discontinued During This Encounter   Medication Reason    ibuprofen (ADVIL;MOTRIN) 800 MG tablet     silver sulfADIAZINE (SILVADENE) 1 % cream     albuterol sulfate HFA (VENTOLIN HFA) 108 (90 Base) MCG/ACT inhaler REORDER         Ag Healy received counseling on the following healthy behaviors: nutrition, exercise, medication adherence and tobacco cessation  Reviewed prior labs and health maintenance  Continue current medications, diet and exercise. Discussed use, benefit, and side effects of prescribed medications. Barriers to medication compliance addressed. Patient given educational materials - see patient instructions  Was a self-tracking handout given in paper form or via [employer]? Yes    Requested Prescriptions     Signed Prescriptions Disp Refills    albuterol sulfate HFA (VENTOLIN HFA) 108 (90 Base) MCG/ACT inhaler 1 Inhaler 3     Sig: Inhale 2 puffs into the lungs every 6 hours as needed for Wheezing       All patient questions answered. Patient voiced understanding. Quality Measures    Body mass index is 26.39 kg/m². Normal. Weight control planned discussed Healthy diet and regular exercise. BP: 123/79 Blood pressure is normal. Treatment plan consists of No treatment change needed. Lab Results   Component Value Date    [id number] 622 (H) 08/08/2018    (goal LDL reduction with dx if diabetes is 50% LDL reduction)      PHQ Scores 8/13/2018   PHQ2 Score 0   PHQ9 Score 0     Interpretation of Total Score Depression Severity: 1-4 = Minimal depression, 5-9 = Mild depression, 10-14 = Moderate depression, 15-19 = Moderately severe depression, 20-27 = Severe depression      The patient's past medical, surgical, social, and family history as well as her   current medications and allergies were reviewed as documented in today's encounter.       Medications, labs, diagnostic studies, consultations and follow-up as documented in this encounter. No Follow-up on file. Patient was seen with total face to face time of 30 minutes. More than 50% of this visit was counseling and education. Future Appointments  Date Time Provider Tiki Lewis   8/27/2018 7:30 AM [facility] PFT RM 1 [facility] PFT [facility]   8/27/2018 8:45 AM STV MAMMO RM 2 [facility] MAMMO [facility]   8/30/2018 11:00 AM STC IR  STCZ SPECIAL [facility] Radiolog   9/12/2018 10:30 AM Miguel Fan MD [facility]       This note was completed by using the assistance of a speech-recognition program. However, inadvertent computerized transcription errors may be present. Although every effort was made to ensure accuracy, no guarantees can be provided that every mistake has been identified and corrected by editing.     Electronically signed by Miguel Fan MD on 8/23/2018 at 1:16 PM

## 2018-08-27 ENCOUNTER — HOSPITAL ENCOUNTER (OUTPATIENT)
Dept: MAMMOGRAPHY | Age: 53
Discharge: HOME | End: 2018-08-29
Payer: MEDICARE

## 2018-08-27 ENCOUNTER — HOSPITAL ENCOUNTER (OUTPATIENT)
Dept: PULMONOLOGY | Age: 53
Discharge: HOME | End: 2018-08-27
Payer: MEDICARE

## 2018-08-27 DIAGNOSIS — J44.9 CHRONIC OBSTRUCTIVE PULMONARY DISEASE, UNSPECIFIED COPD TYPE (HCC): ICD-10-CM

## 2018-08-27 DIAGNOSIS — R92.8 ABNORMAL MAMMOGRAM: Primary | ICD-10-CM

## 2018-08-27 DIAGNOSIS — Z12.31 ENCOUNTER FOR SCREENING MAMMOGRAM FOR BREAST CANCER: ICD-10-CM

## 2018-08-27 PROCEDURE — 94640 AIRWAY INHALATION TREATMENT: CPT

## 2018-08-27 PROCEDURE — 94729 DIFFUSING CAPACITY: CPT

## 2018-08-27 PROCEDURE — 99406 BEHAV CHNG SMOKING 3-10 MIN: CPT

## 2018-08-27 PROCEDURE — 94060 EVALUATION OF WHEEZING: CPT

## 2018-08-27 PROCEDURE — 77067 SCR MAMMO BI INCL CAD: CPT

## 2018-08-27 PROCEDURE — 88738 HGB QUANT TRANSCUTANEOUS: CPT

## 2018-08-27 PROCEDURE — 94727 GAS DIL/WSHOT DETER LNG VOL: CPT

## 2018-08-27 PROCEDURE — 94726 PLETHYSMOGRAPHY LUNG VOLUMES: CPT

## 2018-08-27 PROCEDURE — 94664 DEMO&/EVAL PT USE INHALER: CPT

## 2018-08-30 ENCOUNTER — HOSPITAL ENCOUNTER (OUTPATIENT)
Age: 53
Discharge: HOME | End: 2018-08-30
Payer: MEDICARE

## 2018-08-30 ENCOUNTER — HOSPITAL ENCOUNTER (OUTPATIENT)
Dept: INTERVENTIONAL RADIOLOGY/VASCULAR | Age: 53
Discharge: HOME | End: 2018-09-01
Payer: MEDICARE

## 2018-08-30 VITALS
DIASTOLIC BLOOD PRESSURE: 60 MMHG | SYSTOLIC BLOOD PRESSURE: 120 MMHG | OXYGEN SATURATION: 96 % | HEART RATE: 69 BPM | RESPIRATION RATE: 18 BRPM

## 2018-08-30 DIAGNOSIS — E04.1 THYROID NODULE: ICD-10-CM

## 2018-08-30 DIAGNOSIS — Z12.11 SCREENING FOR COLON CANCER: ICD-10-CM

## 2018-08-30 LAB
CONTROL: PRESENT
HEMOCCULT STL QL: NEGATIVE

## 2018-08-30 PROCEDURE — 88305 TISSUE EXAM BY PATHOLOGIST: CPT

## 2018-08-30 PROCEDURE — 88172 CYTP DX EVAL FNA 1ST EA SITE: CPT

## 2018-08-30 PROCEDURE — 2500000003 HC RX 250 WO HCPCS: Performed by: RADIOLOGY

## 2018-08-30 PROCEDURE — 88173 CYTOPATH EVAL FNA REPORT: CPT

## 2018-08-30 PROCEDURE — 2709999900 IR BIOPSY THYROID PERC CORE NEEDLE

## 2018-08-30 PROCEDURE — 76942 ECHO GUIDE FOR BIOPSY: CPT

## 2018-08-30 RX ORDER — LIDOCAINE HYDROCHLORIDE 20 MG/ML
INJECTION, SOLUTION EPIDURAL; INFILTRATION; INTRACAUDAL; PERINEURAL
Status: COMPLETED | OUTPATIENT
Start: 2018-08-30 | End: 2018-08-30

## 2018-08-30 RX ADMIN — LIDOCAINE HYDROCHLORIDE 2 ML: 20 INJECTION, SOLUTION EPIDURAL; INFILTRATION; INTRACAUDAL; PERINEURAL at 11:01

## 2018-08-30 NOTE — BRIEF OP NOTE
Brief Postoperative Note    Leigh Ann Pyle  YOB: 1965  [id number]    Pre-operative Diagnosis: Right thyroid lobe nodule     Post-operative Diagnosis: Same    Procedure: U/S guided FNA     Anesthesia: Local    Surgeons/Assistants: Yifan Delgadillo MD    Estimated Blood Loss: less than 50     Complications: None    Specimens: Was Obtained: Three 25 gauge needles through a 3 cm right thyroid lobe nodule     Findings:  Three 25 gauge needles through a 3 cm right thyroid lobe nodule       Electronically signed by Yifan Delgadillo MD on 8/30/2018 at 11:23 AM

## 2018-08-31 LAB — SURGICAL PATHOLOGY REPORT: NORMAL

## 2018-09-05 ENCOUNTER — TELEPHONE (OUTPATIENT)
Dept: FAMILY MEDICINE CLINIC | Age: 53
End: 2018-09-05

## 2018-09-05 ENCOUNTER — HOSPITAL ENCOUNTER (OUTPATIENT)
Dept: WOMENS IMAGING | Age: 53
Discharge: HOME | End: 2018-09-07
Payer: MEDICARE

## 2018-09-05 DIAGNOSIS — R92.1 BREAST CALCIFICATION, LEFT: ICD-10-CM

## 2018-09-05 DIAGNOSIS — R92.8 ABNORMAL MAMMOGRAM: ICD-10-CM

## 2018-09-05 DIAGNOSIS — R92.1 CALCIFICATION OF RIGHT BREAST: Primary | ICD-10-CM

## 2018-09-05 PROCEDURE — 76642 ULTRASOUND BREAST LIMITED: CPT

## 2018-09-05 PROCEDURE — G0279 TOMOSYNTHESIS, MAMMO: HCPCS

## 2018-09-05 NOTE — TELEPHONE ENCOUNTER
----- Message from Heri Coates sent at 9/5/2018 11:38 AM EDT -----  Patient has been scheduled for the recommended biopsies on 9/12. Please place orders into EPIC using order codes [id number] and [id number]. I will obtain them from [facility] when available. If you have questions or concerns please contact me at [phone]. French Bailey, RT-R,M, CN-BI, Patient Navigator Breast Imaging.

## 2018-09-05 NOTE — TELEPHONE ENCOUNTER
Order placed   Diagnosis Orders   1. Calcification of right breast  US BREAST BIOPSY W LOC DEVICE 1ST LESION LEFT   2.  Breast calcification, left  MORE STEREO BREAST BX W LOC DEVICE 1ST LESION LEFT

## 2018-09-06 NOTE — TELEPHONE ENCOUNTER
Message already read by  Yobany Escalante.   Patient is already scheduled for biopsy  Future Appointments  Date Time Provider Tiki Debbie   9/12/2018 10:30 AM Rosey Pérez MD fp sc [facility]   9/12/2018 1:30 PM [facility] STEREOTACTIC RM [facility] Radiolog   9/12/2018 2:30 PM [facility] [address] [facility] RADIOLOGIST CZ [facility] Radiolog

## 2018-09-11 LAB — CYTOLOGY REPORT: NORMAL

## 2018-09-12 ENCOUNTER — OFFICE VISIT (OUTPATIENT)
Dept: FAMILY MEDICINE CLINIC | Age: 53
End: 2018-09-12
Payer: MEDICARE

## 2018-09-12 ENCOUNTER — HOSPITAL ENCOUNTER (OUTPATIENT)
Dept: WOMENS IMAGING | Age: 53
Discharge: HOME | End: 2018-09-14
Payer: MEDICARE

## 2018-09-12 ENCOUNTER — TELEPHONE (OUTPATIENT)
Dept: FAMILY MEDICINE CLINIC | Age: 53
End: 2018-09-12

## 2018-09-12 VITALS
DIASTOLIC BLOOD PRESSURE: 63 MMHG | BODY MASS INDEX: 26.12 KG/M2 | HEIGHT: 65 IN | HEART RATE: 72 BPM | SYSTOLIC BLOOD PRESSURE: 132 MMHG | OXYGEN SATURATION: 96 % | WEIGHT: 156.8 LBS

## 2018-09-12 VITALS — SYSTOLIC BLOOD PRESSURE: 136 MMHG | HEART RATE: 69 BPM | DIASTOLIC BLOOD PRESSURE: 81 MMHG

## 2018-09-12 DIAGNOSIS — K62.3 INCOMPLETE RECTAL PROLAPSE: Primary | ICD-10-CM

## 2018-09-12 DIAGNOSIS — R92.1 BREAST CALCIFICATION, LEFT: ICD-10-CM

## 2018-09-12 DIAGNOSIS — B35.1 ONYCHOMYCOSIS: ICD-10-CM

## 2018-09-12 DIAGNOSIS — R92.1 CALCIFICATION OF RIGHT BREAST: ICD-10-CM

## 2018-09-12 DIAGNOSIS — R92.8 ABNORMAL MAMMOGRAM: ICD-10-CM

## 2018-09-12 DIAGNOSIS — E78.2 MIXED HYPERLIPIDEMIA: ICD-10-CM

## 2018-09-12 DIAGNOSIS — E04.1 THYROID NODULE: ICD-10-CM

## 2018-09-12 PROCEDURE — 88305 TISSUE EXAM BY PATHOLOGIST: CPT

## 2018-09-12 PROCEDURE — 4004F PT TOBACCO SCREEN RCVD TLK: CPT | Performed by: FAMILY MEDICINE

## 2018-09-12 PROCEDURE — 19081 BX BREAST 1ST LESION STRTCTC: CPT

## 2018-09-12 PROCEDURE — 3017F COLORECTAL CA SCREEN DOC REV: CPT | Performed by: FAMILY MEDICINE

## 2018-09-12 PROCEDURE — 99214 OFFICE O/P EST MOD 30 MIN: CPT | Performed by: FAMILY MEDICINE

## 2018-09-12 PROCEDURE — 19083 BX BREAST 1ST LESION US IMAG: CPT

## 2018-09-12 PROCEDURE — 77065 DX MAMMO INCL CAD UNI: CPT

## 2018-09-12 PROCEDURE — G8427 DOCREV CUR MEDS BY ELIG CLIN: HCPCS | Performed by: FAMILY MEDICINE

## 2018-09-12 PROCEDURE — G8419 CALC BMI OUT NRM PARAM NOF/U: HCPCS | Performed by: FAMILY MEDICINE

## 2018-09-12 ASSESSMENT — ENCOUNTER SYMPTOMS
WHEEZING: 0
ABDOMINAL PAIN: 0
BACK PAIN: 0
RECTAL PAIN: 1
CONSTIPATION: 0
SHORTNESS OF BREATH: 0
SINUS PRESSURE: 0
DIARRHEA: 0
NAUSEA: 0
PHOTOPHOBIA: 0
SINUS PAIN: 0
COUGH: 0
RHINORRHEA: 0

## 2018-09-12 NOTE — PROGRESS NOTES
Visit Information    Have you changed or started any medications since your last visit including any over-the-counter medicines, vitamins, or herbal medicines? no   Are you having any side effects from any of your medications? -  no  Have you stopped taking any of your medications? Is so, why? -  no    Have you seen any other physician or provider since your last visit? Yes - Records Obtained  Have you had any other diagnostic tests since your last visit? Yes - Records Obtained  Have you been seen in the emergency room and/or had an admission to a hospital since we last saw you? No  Have you had your routine dental cleaning in the past 6 months? no    Have you activated your [employer] account? If not, what are your barriers?  Yes     Patient Care Team:  Yael Carmona MD as PCP - General (Family Medicine)    Medical History Review  Past Medical, Family, and Social History reviewed and does contribute to the patient presenting condition    Health Maintenance   Topic Date Due    Shingles Vaccine (1 of 2 - 2 Dose Series) 02/14/2017    Flu vaccine (1) 09/01/2018    A1C test (Diabetic or Prediabetic)  08/23/2019    Colon Cancer Screen FIT/FOBT  08/30/2019    Breast cancer screen  09/05/2020    Cervical cancer screen  08/23/2021    Lipid screen  08/08/2023    DTaP/Tdap/Td vaccine (2 - Td) 10/14/2027    Pneumococcal med risk  Completed    HIV screen  Completed

## 2018-09-12 NOTE — PROGRESS NOTES
[phone]  Fax: [phone]  SURGICAL PATHOLOGY REPORT    Patient Name: Gisselle Rios  MR#: [id number]  Specimen #[id number]      Final Diagnosis  SPECIMENS \"A\" & \"B\":  THYROID, RIGHT LOBE, FNA:        SATISFACTORY FOR EVALUATION    BENIGN      CONSISTENT WITH BENIGN FOLLICULAR NODULE (ADENOMATOID NODULE,  COLLOID NODULE, ETC)      Vilma Russell D.O.  **Electronically Signed Out**         tc/8/31/2018  Frozen Section Diagnosis  Rapid Cytologic Interpretation:  3 passes. Specimen adequate for  interpretation. Findings conveyed to Dr. Dietra Heimlich on August 31, 2018. Vilma Russell D.O.)    Clinical Information  Right thyroid nodule; right thyroid biopsy    Source:  A: Right thyroid aspirate, smears, Diff Quik  B: Right thyroid aspirate, sediment    Gross Description  Specimen \"A\":  The specimen consists of the aspirate biopsy of the  right thyroid. Thirteen Papanicolaou stain                          ed smears and seven  Diff-Quik are prepared for microscopic examination. Specimen \"B\":  The specimen consists of the sediment from the right  thyroid. The entire specimen is submitted for cell block preparation. Microscopic Description  Specimens \"A\" & \"B\":  Microscopic examination of Papanicolaou and  Diff-Quik stained smears along with H&E cell block sections are  reviewed. They demonstrate moderate amounts of watery colloid. The  specimen is adequate for interpretation although somewhat  hypocellular. Thyroid follicular cells arranged in macrofollicles,  flat sheets and microfollicles are noted with the former  predominating. The morphologic features of papillary carcinoma are  not identified.                Most recent labs reviewed:     Lab Results   Component Value Date    WBC 7.5 08/08/2018    HGB 16.5 (H) 08/08/2018    HCT 47.7 (H) 08/08/2018    MCV 85.3 08/08/2018     08/08/2018       Lab Results   Component Value Date     08/08/2018    K 4.8 08/08/2018    CL 99 08/08/2018 agitation, decreased concentration, hallucinations and sleep disturbance. The patient is not nervous/anxious. Physical Exam    PHYSICAL EXAM:   VITALS:   Vitals:    09/12/18 1006   BP: 132/63   Pulse: 72   SpO2: 96%     GENERAL:  Patient is a well-developed, well-nourished female  in no acute distress, alert and oriented x3, appropriate and pleasant conversation. HEAD: Normocephalic, atraumatic. EYES: Pupils equal, round and reactive to light and accommodation, extraocular   movements intact. ENT: Moist mucous membranes. No erythema is noted. NECK: Supple. No masses. No lymphadenopathy. CARDIOVASCULAR: Regular rate and rhythm. PULMONARY: Lungs are clear to auscultation bilaterally. ABDOMEN: Soft, nontender, nondistended. Positive bowel sounds. MUSCULOSKELETAL: Strength 5/5 bilaterally in all extremities. No tenderness to   palpation of the ribs, long bones, or spine. Skin: Long and thick nails. Onychomycosis  NEUROLOGIC: Cranial nerves II through XII grossly intact. No focal deficits are noted. ASSESSMENT AND PLAN      1. Incomplete rectal prolapse  -  - Viviane Shin MD    2. Onychomycosis  -  - Avelina Bolanos DPM, [address]*    3. Breast calcification, left  -Biopsy scheduled today    4. Thyroid nodule  -Benign in nature continue to monitor    5.  Mixed hyperlipidemia  -Continue statins      Orders Placed This Encounter   Procedures   Viviane Shin MD     Referral Priority:   Routine     Referral Type:   Consult for Advice and Opinion     Referral Reason:   Specialty Services Required     Referred to Provider:   Lang Brewer MD     Requested Specialty:   General Surgery     Number of Visits Requested:   [address] [facility] Monalisa Bolanos DPM, Podiatry [place]*     Referral Priority:   Routine     Referral Type:   Consult for Advice and Opinion     Referral Reason:   Specialty Services Required     Referred to Provider:   Trudy Styles 9/12/2018 1:30 PM [facility] STEREOTACTIC RM STCZ MAMMO [facility] Radiolog   9/12/2018 2:30 PM [facility] [address] RADIOLOGIST STCZ MAMMO [facility] Radiolog   10/24/2018 10:30 AM Franciso Gitelman, MD [facility]     This note was completed by using the assistance of a speech-recognition program. However, inadvertent computerized transcription errors may be present. Although every effort was made to ensure accuracy, no guarantees can be provided that every mistake has been identified and corrected by editing.   Electronically signed by Franciso Gitelman, MD on 9/12/2018  10:39 AM

## 2018-09-14 LAB — SURGICAL PATHOLOGY REPORT: NORMAL

## 2018-09-17 PROBLEM — K62.3 INCOMPLETE RECTAL PROLAPSE: Status: ACTIVE | Noted: 2018-09-17

## 2018-10-02 ENCOUNTER — OFFICE VISIT (OUTPATIENT)
Dept: PODIATRY | Age: 53
End: 2018-10-02
Payer: MEDICARE

## 2018-10-02 VITALS
WEIGHT: 172 LBS | HEART RATE: 69 BPM | SYSTOLIC BLOOD PRESSURE: 127 MMHG | BODY MASS INDEX: 28.66 KG/M2 | DIASTOLIC BLOOD PRESSURE: 73 MMHG | HEIGHT: 65 IN

## 2018-10-02 DIAGNOSIS — B35.1 ONYCHOMYCOSIS OF TOENAIL: Primary | ICD-10-CM

## 2018-10-02 DIAGNOSIS — L84 CORNS AND CALLOSITIES: ICD-10-CM

## 2018-10-02 DIAGNOSIS — M79.675 PAIN OF TOES OF BOTH FEET: ICD-10-CM

## 2018-10-02 DIAGNOSIS — M79.674 PAIN OF TOES OF BOTH FEET: ICD-10-CM

## 2018-10-02 PROCEDURE — 11721 DEBRIDE NAIL 6 OR MORE: CPT | Performed by: PODIATRIST

## 2018-10-02 PROCEDURE — 11056 PARNG/CUTG B9 HYPRKR LES 2-4: CPT | Performed by: PODIATRIST

## 2018-10-02 PROCEDURE — 99999 PR OFFICE/OUTPT VISIT,PROCEDURE ONLY: CPT | Performed by: PODIATRIST

## 2018-10-02 ASSESSMENT — ENCOUNTER SYMPTOMS
DIARRHEA: 0
BACK PAIN: 0
COLOR CHANGE: 0
NAUSEA: 0
SHORTNESS OF BREATH: 0

## 2018-10-02 NOTE — PROGRESS NOTES
pain or grinding. Range of motion to the left ankle is  free of pain or grinding. Range of motion to the right subtalar joint is  free of pain or grinding. Range of motion to the left subtalar joint is  free of pain or grinding. No abnormalities, asymmetries, or misalignments are seen between the extremities. Weightbearing evaluation does not reveal rearfoot eversion, medial prominence of the talar head, loss of the medial longitudinal arch height, and too many toes sign bilaterally. The digits of the right foot are not contracted. The digits of the left foot are not contracted. There is no prominence noted to the first metatarsal head without abduction of the hallux of the right foot. There is no prominence noted to the first metatarsal head without abduction of the hallux of the left foot. Shoe examination was performed. Biomechanical Exam: normal bilaterally. Asessment: Patient is a 48 y.o. female with:    Diagnosis Orders   1. Onychomycosis of toenail  PA DEBRIDEMENT OF NAILS, 6 OR MORE   2. Corns and callosities  TRIM BENIGN HYPERKERATOTIC SKIN LESION,2-4   3. Pain of toes of both feet  PA DEBRIDEMENT OF NAILS, 6 OR MORE    TRIM BENIGN HYPERKERATOTIC SKIN LESION,2-4       Plan:  1. Clinical evaluation of the patient. 2. Toenails 1-5 of the right foot and 1-5 of the left foot were debrided in length and thickness using a nail nipper and a . The lesion(s) to the bilateral foot debrided with a 15 blade without event. 3. Contact office with any questions/problems/concerns.   Return in about 9 weeks (around 12/4/2018) for Painful fungal nails, Painful callous(es).   10/2/2018      Deuce Arvizu DPM

## 2018-10-24 ENCOUNTER — OFFICE VISIT (OUTPATIENT)
Dept: FAMILY MEDICINE CLINIC | Age: 53
End: 2018-10-24
Payer: MEDICARE

## 2018-10-24 VITALS
BODY MASS INDEX: 26.62 KG/M2 | HEIGHT: 65 IN | SYSTOLIC BLOOD PRESSURE: 115 MMHG | HEART RATE: 68 BPM | OXYGEN SATURATION: 98 % | WEIGHT: 159.8 LBS | DIASTOLIC BLOOD PRESSURE: 74 MMHG

## 2018-10-24 DIAGNOSIS — J41.0 SIMPLE CHRONIC BRONCHITIS (HCC): Primary | ICD-10-CM

## 2018-10-24 DIAGNOSIS — K62.3 INCOMPLETE RECTAL PROLAPSE: ICD-10-CM

## 2018-10-24 DIAGNOSIS — R92.1 BREAST CALCIFICATION, LEFT: ICD-10-CM

## 2018-10-24 PROCEDURE — 4004F PT TOBACCO SCREEN RCVD TLK: CPT | Performed by: FAMILY MEDICINE

## 2018-10-24 PROCEDURE — G8419 CALC BMI OUT NRM PARAM NOF/U: HCPCS | Performed by: FAMILY MEDICINE

## 2018-10-24 PROCEDURE — 3023F SPIROM DOC REV: CPT | Performed by: FAMILY MEDICINE

## 2018-10-24 PROCEDURE — G8484 FLU IMMUNIZE NO ADMIN: HCPCS | Performed by: FAMILY MEDICINE

## 2018-10-24 PROCEDURE — 3017F COLORECTAL CA SCREEN DOC REV: CPT | Performed by: FAMILY MEDICINE

## 2018-10-24 PROCEDURE — G8427 DOCREV CUR MEDS BY ELIG CLIN: HCPCS | Performed by: FAMILY MEDICINE

## 2018-10-24 PROCEDURE — G8926 SPIRO NO PERF OR DOC: HCPCS | Performed by: FAMILY MEDICINE

## 2018-10-24 PROCEDURE — 99214 OFFICE O/P EST MOD 30 MIN: CPT | Performed by: FAMILY MEDICINE

## 2018-10-24 RX ORDER — GUAIFENESIN 600 MG/1
600 TABLET, EXTENDED RELEASE ORAL 2 TIMES DAILY
Qty: 60 TABLET | Refills: 0 | Status: SHIPPED | OUTPATIENT
Start: 2018-10-24 | End: 2019-06-06 | Stop reason: SDUPTHER

## 2018-10-24 ASSESSMENT — ENCOUNTER SYMPTOMS
ABDOMINAL PAIN: 0
RECTAL PAIN: 0
WHEEZING: 0
COUGH: 1
BLOOD IN STOOL: 0
CONSTIPATION: 0
SHORTNESS OF BREATH: 0

## 2018-10-24 NOTE — PROGRESS NOTES
Chief Complaint   Patient presents with    Results    6 Month Follow-Up     mamogram of left breast         Cristina Arthur  here today for follow up on chronic medical problems, go over labs and/or diagnostic studies, and medication refills. Results and 6 Month Follow-Up (mamogram of left breast)      HPI:Patient is here for follow-up on breast biopsy results which is benign. PFT done almost normal.  Patient is on albuterol inhaler as needed basis complaining of dry cough and chest tightness denies any fever or chills. The symptoms are since past 3 days, denies any shortness of breath or any wheezing. Patient denies any rhinorrhea. Hyperlipidemia on statins. She has seen colorectal surgeon and is scheduled for colonoscopy. /74 (Site: Left Upper Arm, Position: Sitting)   Pulse 68   Ht 5' 5\" (1.651 m)   Wt 159 lb 12.8 oz (72.5 kg)   LMP 04/16/2016   SpO2 98%   BMI 26.59 kg/m²   Body mass index is 26.59 kg/m². Wt Readings from Last 3 Encounters:   10/24/18 159 lb 12.8 oz (72.5 kg)   10/02/18 172 lb (78 kg)   09/12/18 156 lb 12.8 oz (71.1 kg)        []Negative depression screening. PHQ Scores 8/13/2018   PHQ2 Score 0   PHQ9 Score 0      []1-4 = Minimal depression   []5-9 = Milddepression   []10-14 = Moderate depression   []15-19 = Moderately severe depression   []20-27 = Severe depression    Discussed testing with the patient and all questions fully answered. Hospital Outpatient Visit on 09/12/2018   Component Date Value Ref Range Status    Surgical Pathology Report 09/12/2018    Final                    Value:(NOTE)  [id number]  [address]  [address]  [phone]  Fax: [phone]  SURGICAL PATHOLOGY REPORT    Patient Name: Cruzito Ngo  MR#: [id number]  Specimen #[id number]      Final Diagnosis  SPECIMEN \"A\":  LEFT BREAST, 6 O'CLOCK, STEREOTACTIC BIOPSY:        BENIGN BREAST TISSUE WITH FIBROCYSTIC CHANGE AND Lab Results   Component Value Date    CHOL 303 (H) 08/08/2018    CHOL 400 (H) 06/23/2016     Lab Results   Component Value Date    TRIG 162 (H) 08/08/2018    TRIG 252 (H) 06/23/2016     Lab Results   Component Value Date    HDL 47 08/08/2018    HDL 50 06/23/2016     Lab Results   Component Value Date    LDLCHOLESTEROL 224 (H) 08/08/2018    LDLCHOLESTEROL 300 (H) 06/23/2016     Lab Results   Component Value Date    VLDL NOT REPORTED 08/08/2018    VLDL NOT REPORTED 06/23/2016     Lab Results   Component Value Date    CHOLHDLRATIO 6.4 (H) 08/08/2018    CHOLHDLRATIO 8.0 (H) 06/23/2016       Lab Results   Component Value Date    LABA1C 5.8 08/23/2018       No results found for: [id number]    No results found for: FOLATE    No results found for: IRON, TIBC, FERRITIN    No results found for: VITD25          Current Outpatient Prescriptions   Medication Sig Dispense Refill    guaiFENesin (MUCINEX) 600 MG extended release tablet Take 1 tablet by mouth 2 times daily 60 tablet 0    albuterol sulfate HFA (VENTOLIN HFA) 108 (90 Base) MCG/ACT inhaler Inhale 2 puffs into the lungs every 6 hours as needed for Wheezing 1 Inhaler 3    atorvastatin (LIPITOR) 40 MG tablet Take 1 tablet by mouth daily 30 tablet 3    aspirin EC 81 MG EC tablet Take 1 tablet by mouth daily 30 tablet 3    nicotine (NICODERM CQ) 14 MG/24HR Place 1 patch onto the skin every 24 hours 30 patch 3    Blood Pressure Monitor KIT TAKE BLOOD PRESSURE DAILY 1 kit 0    fluticasone (FLONASE) 50 MCG/ACT nasal spray 1 spray by Nasal route 2 times daily for 14 days 1 Bottle 2     No current facility-administered medications for this visit. Social History     Social History    Marital status: Single     Spouse name: N/A    Number of children: N/A    Years of education: N/A     Occupational History    Not on file.      Social History Main Topics    Smoking status: Current Some Day Smoker     Packs/day: 1.00     Years: 28.00     Types:

## 2019-01-07 DIAGNOSIS — R05.9 COUGH: ICD-10-CM

## 2019-01-07 RX ORDER — ALBUTEROL SULFATE 90 UG/1
2 AEROSOL, METERED RESPIRATORY (INHALATION) EVERY 6 HOURS PRN
Qty: 1 INHALER | Refills: 3 | Status: SHIPPED | OUTPATIENT
Start: 2019-01-07 | End: 2019-06-06 | Stop reason: SDUPTHER

## 2019-01-24 ENCOUNTER — OFFICE VISIT (OUTPATIENT)
Dept: FAMILY MEDICINE CLINIC | Age: 54
End: 2019-01-24
Payer: MEDICARE

## 2019-01-24 ENCOUNTER — HOSPITAL ENCOUNTER (OUTPATIENT)
Age: 54
Discharge: HOME | End: 2019-01-24
Payer: MEDICARE

## 2019-01-24 VITALS
HEART RATE: 67 BPM | DIASTOLIC BLOOD PRESSURE: 67 MMHG | WEIGHT: 155.8 LBS | SYSTOLIC BLOOD PRESSURE: 114 MMHG | OXYGEN SATURATION: 96 % | HEIGHT: 65 IN | BODY MASS INDEX: 25.96 KG/M2

## 2019-01-24 DIAGNOSIS — K62.3 INCOMPLETE RECTAL PROLAPSE: ICD-10-CM

## 2019-01-24 DIAGNOSIS — R80.9 PROTEINURIA, UNSPECIFIED TYPE: ICD-10-CM

## 2019-01-24 DIAGNOSIS — E78.2 MIXED HYPERLIPIDEMIA: ICD-10-CM

## 2019-01-24 DIAGNOSIS — J01.90 ACUTE BACTERIAL SINUSITIS: ICD-10-CM

## 2019-01-24 DIAGNOSIS — J40 BRONCHITIS: Primary | ICD-10-CM

## 2019-01-24 DIAGNOSIS — R73.03 PREDIABETES: ICD-10-CM

## 2019-01-24 DIAGNOSIS — J41.0 SIMPLE CHRONIC BRONCHITIS (HCC): ICD-10-CM

## 2019-01-24 DIAGNOSIS — B96.89 ACUTE BACTERIAL SINUSITIS: ICD-10-CM

## 2019-01-24 LAB
-: ABNORMAL
AMORPHOUS: ABNORMAL
BACTERIA: ABNORMAL
BILIRUBIN URINE: NEGATIVE
CASTS UA: ABNORMAL /LPF
CHOLESTEROL/HDL RATIO: 5.4
CHOLESTEROL: 210 MG/DL
COLOR: YELLOW
COMMENT UA: ABNORMAL
CRYSTALS, UA: ABNORMAL /HPF
EPITHELIAL CELLS UA: ABNORMAL /HPF
GLUCOSE URINE: NEGATIVE
HBA1C MFR BLD: 5.8 %
HDLC SERPL-MCNC: 39 MG/DL
KETONES, URINE: NEGATIVE
LDL CHOLESTEROL: 151 MG/DL (ref 0–130)
LEUKOCYTE ESTERASE, URINE: NEGATIVE
MUCUS: ABNORMAL
NITRITE, URINE: NEGATIVE
OTHER OBSERVATIONS UA: ABNORMAL
PH UA: 7 (ref 5–8)
PROTEIN UA: NEGATIVE
RBC UA: ABNORMAL /HPF
RENAL EPITHELIAL, UA: ABNORMAL /HPF
SPECIFIC GRAVITY UA: 1.01 (ref 1–1.03)
TRICHOMONAS: ABNORMAL
TRIGL SERPL-MCNC: 99 MG/DL
TURBIDITY: CLEAR
URINE HGB: NEGATIVE
UROBILINOGEN, URINE: NORMAL
VLDLC SERPL CALC-MCNC: ABNORMAL MG/DL (ref 1–30)
WBC UA: ABNORMAL /HPF
YEAST: ABNORMAL

## 2019-01-24 PROCEDURE — G8419 CALC BMI OUT NRM PARAM NOF/U: HCPCS | Performed by: FAMILY MEDICINE

## 2019-01-24 PROCEDURE — 80061 LIPID PANEL: CPT

## 2019-01-24 PROCEDURE — G8427 DOCREV CUR MEDS BY ELIG CLIN: HCPCS | Performed by: FAMILY MEDICINE

## 2019-01-24 PROCEDURE — G8484 FLU IMMUNIZE NO ADMIN: HCPCS | Performed by: FAMILY MEDICINE

## 2019-01-24 PROCEDURE — 83036 HEMOGLOBIN GLYCOSYLATED A1C: CPT | Performed by: FAMILY MEDICINE

## 2019-01-24 PROCEDURE — 3017F COLORECTAL CA SCREEN DOC REV: CPT | Performed by: FAMILY MEDICINE

## 2019-01-24 PROCEDURE — 4004F PT TOBACCO SCREEN RCVD TLK: CPT | Performed by: FAMILY MEDICINE

## 2019-01-24 PROCEDURE — G8926 SPIRO NO PERF OR DOC: HCPCS | Performed by: FAMILY MEDICINE

## 2019-01-24 PROCEDURE — 99214 OFFICE O/P EST MOD 30 MIN: CPT | Performed by: FAMILY MEDICINE

## 2019-01-24 PROCEDURE — 81001 URINALYSIS AUTO W/SCOPE: CPT

## 2019-01-24 PROCEDURE — 3023F SPIROM DOC REV: CPT | Performed by: FAMILY MEDICINE

## 2019-01-24 PROCEDURE — 36415 COLL VENOUS BLD VENIPUNCTURE: CPT

## 2019-01-24 RX ORDER — FLUTICASONE PROPIONATE 50 MCG
1 SPRAY, SUSPENSION (ML) NASAL 2 TIMES DAILY
Qty: 1 BOTTLE | Refills: 2 | Status: SHIPPED | OUTPATIENT
Start: 2019-01-24 | End: 2019-06-06 | Stop reason: SDUPTHER

## 2019-01-24 RX ORDER — ASPIRIN 81 MG/1
81 TABLET ORAL DAILY
Qty: 30 TABLET | Refills: 3 | Status: SHIPPED | OUTPATIENT
Start: 2019-01-24 | End: 2019-06-06 | Stop reason: SDUPTHER

## 2019-01-24 RX ORDER — AZITHROMYCIN 250 MG/1
TABLET, FILM COATED ORAL
Qty: 6 TABLET | Refills: 0 | Status: SHIPPED | OUTPATIENT
Start: 2019-01-24 | End: 2019-01-29

## 2019-01-24 RX ORDER — LORATADINE 10 MG/1
10 TABLET ORAL DAILY
Qty: 30 TABLET | Refills: 0 | Status: SHIPPED | OUTPATIENT
Start: 2019-01-24 | End: 2021-10-07

## 2019-01-24 ASSESSMENT — ENCOUNTER SYMPTOMS
BACK PAIN: 0
EYE REDNESS: 0
NAUSEA: 0
VOMITING: 0
COUGH: 1
RHINORRHEA: 1
PHOTOPHOBIA: 0
ANAL BLEEDING: 0
SORE THROAT: 1
COLOR CHANGE: 0
SINUS PRESSURE: 1
SINUS PAIN: 1
CONSTIPATION: 0
BLOOD IN STOOL: 0
ABDOMINAL PAIN: 0
WHEEZING: 0
ABDOMINAL DISTENTION: 0
SHORTNESS OF BREATH: 1
DIARRHEA: 0

## 2019-05-22 DIAGNOSIS — E78.2 MIXED HYPERLIPIDEMIA: ICD-10-CM

## 2019-05-22 RX ORDER — ATORVASTATIN CALCIUM 40 MG/1
40 TABLET, FILM COATED ORAL DAILY
Qty: 30 TABLET | Refills: 0 | Status: SHIPPED | OUTPATIENT
Start: 2019-05-22 | End: 2019-06-06 | Stop reason: SDUPTHER

## 2019-05-22 NOTE — TELEPHONE ENCOUNTER
Please Approve or Refuse.   Send to Pharmacy per Pt's Request:    Next Visit Date:  6/6/2019   Last Visit Date: 1/24/2019    Hemoglobin A1C (%)   Date Value   01/24/2019 5.8   08/23/2018 5.8             ( goal A1C is < 7)   BP Readings from Last 3 Encounters:   01/24/19 114/67   10/24/18 115/74   10/02/18 127/73          (goal 120/80)  BUN   Date Value Ref Range Status   08/08/2018 11 6 - 20 mg/dL Final     CREATININE   Date Value Ref Range Status   08/08/2018 0.65 0.50 - 0.90 mg/dL Final     Potassium   Date Value Ref Range Status   08/08/2018 4.8 3.7 - 5.3 mmol/L Final     Comment:     SPECIMEN MODERATELY HEMOLYZED, RESULTS MAY BE ADVERSELY AFFECTED

## 2019-06-06 ENCOUNTER — HOSPITAL ENCOUNTER (OUTPATIENT)
Dept: GENERAL RADIOLOGY | Age: 54
Discharge: HOME | End: 2019-06-08
Payer: MEDICARE

## 2019-06-06 ENCOUNTER — HOSPITAL ENCOUNTER (OUTPATIENT)
Age: 54
Discharge: HOME | End: 2019-06-08
Payer: MEDICARE

## 2019-06-06 ENCOUNTER — OFFICE VISIT (OUTPATIENT)
Dept: FAMILY MEDICINE CLINIC | Age: 54
End: 2019-06-06
Payer: MEDICARE

## 2019-06-06 VITALS
SYSTOLIC BLOOD PRESSURE: 104 MMHG | WEIGHT: 158 LBS | DIASTOLIC BLOOD PRESSURE: 65 MMHG | BODY MASS INDEX: 26.33 KG/M2 | HEIGHT: 65 IN | OXYGEN SATURATION: 96 % | HEART RATE: 78 BPM

## 2019-06-06 DIAGNOSIS — J41.0 SIMPLE CHRONIC BRONCHITIS (HCC): ICD-10-CM

## 2019-06-06 DIAGNOSIS — B96.89 ACUTE BACTERIAL SINUSITIS: ICD-10-CM

## 2019-06-06 DIAGNOSIS — Z23 NEED FOR PROPHYLACTIC VACCINATION AND INOCULATION AGAINST VARICELLA: ICD-10-CM

## 2019-06-06 DIAGNOSIS — M25.562 ACUTE PAIN OF LEFT KNEE: ICD-10-CM

## 2019-06-06 DIAGNOSIS — Z87.891 PERSONAL HISTORY OF SMOKING: ICD-10-CM

## 2019-06-06 DIAGNOSIS — E78.2 MIXED HYPERLIPIDEMIA: ICD-10-CM

## 2019-06-06 DIAGNOSIS — J01.90 ACUTE BACTERIAL SINUSITIS: ICD-10-CM

## 2019-06-06 PROCEDURE — 3017F COLORECTAL CA SCREEN DOC REV: CPT | Performed by: FAMILY MEDICINE

## 2019-06-06 PROCEDURE — G8926 SPIRO NO PERF OR DOC: HCPCS | Performed by: FAMILY MEDICINE

## 2019-06-06 PROCEDURE — G8419 CALC BMI OUT NRM PARAM NOF/U: HCPCS | Performed by: FAMILY MEDICINE

## 2019-06-06 PROCEDURE — G8427 DOCREV CUR MEDS BY ELIG CLIN: HCPCS | Performed by: FAMILY MEDICINE

## 2019-06-06 PROCEDURE — 4004F PT TOBACCO SCREEN RCVD TLK: CPT | Performed by: FAMILY MEDICINE

## 2019-06-06 PROCEDURE — 99214 OFFICE O/P EST MOD 30 MIN: CPT | Performed by: FAMILY MEDICINE

## 2019-06-06 PROCEDURE — 73560 X-RAY EXAM OF KNEE 1 OR 2: CPT

## 2019-06-06 PROCEDURE — 3023F SPIROM DOC REV: CPT | Performed by: FAMILY MEDICINE

## 2019-06-06 RX ORDER — ASPIRIN 81 MG/1
81 TABLET ORAL DAILY
Qty: 30 TABLET | Refills: 3 | Status: SHIPPED | OUTPATIENT
Start: 2019-06-06 | End: 2019-08-01 | Stop reason: SDUPTHER

## 2019-06-06 RX ORDER — ATORVASTATIN CALCIUM 40 MG/1
40 TABLET, FILM COATED ORAL DAILY
Qty: 30 TABLET | Refills: 0 | Status: SHIPPED | OUTPATIENT
Start: 2019-06-06 | End: 2019-07-12 | Stop reason: SDUPTHER

## 2019-06-06 RX ORDER — FLUTICASONE PROPIONATE 50 MCG
1 SPRAY, SUSPENSION (ML) NASAL 2 TIMES DAILY
Qty: 1 BOTTLE | Refills: 2 | Status: SHIPPED | OUTPATIENT
Start: 2019-06-06 | End: 2021-10-07

## 2019-06-06 RX ORDER — NICOTINE 21 MG/24HR
1 PATCH, TRANSDERMAL 24 HOURS TRANSDERMAL EVERY 24 HOURS
Qty: 30 PATCH | Refills: 3 | Status: SHIPPED | OUTPATIENT
Start: 2019-06-06 | End: 2021-10-07

## 2019-06-06 RX ORDER — BLOOD PRESSURE TEST KIT
KIT MISCELLANEOUS
Qty: 1 KIT | Refills: 0 | Status: CANCELLED | OUTPATIENT
Start: 2019-06-06

## 2019-06-06 RX ORDER — GUAIFENESIN 600 MG/1
600 TABLET, EXTENDED RELEASE ORAL 2 TIMES DAILY
Qty: 60 TABLET | Refills: 0 | Status: SHIPPED | OUTPATIENT
Start: 2019-06-06 | End: 2019-11-11 | Stop reason: SDUPTHER

## 2019-06-06 RX ORDER — ALBUTEROL SULFATE 90 UG/1
2 AEROSOL, METERED RESPIRATORY (INHALATION) EVERY 6 HOURS PRN
Qty: 1 INHALER | Refills: 3 | Status: SHIPPED | OUTPATIENT
Start: 2019-06-06 | End: 2020-09-21 | Stop reason: SDUPTHER

## 2019-06-06 RX ORDER — IBUPROFEN 800 MG/1
800 TABLET ORAL EVERY 8 HOURS PRN
Qty: 30 TABLET | Refills: 0 | Status: SHIPPED | OUTPATIENT
Start: 2019-06-06 | End: 2021-10-07

## 2019-06-06 RX ORDER — LIDOCAINE 40 MG/G
CREAM TOPICAL
Qty: 45 G | Refills: 3 | Status: SHIPPED | OUTPATIENT
Start: 2019-06-06 | End: 2021-10-07

## 2019-06-06 ASSESSMENT — ENCOUNTER SYMPTOMS
RHINORRHEA: 0
SINUS PAIN: 0
COUGH: 0
BACK PAIN: 0
SHORTNESS OF BREATH: 0
DIARRHEA: 0
BLOOD IN STOOL: 0
COLOR CHANGE: 0
SINUS PRESSURE: 1
ABDOMINAL PAIN: 0
WHEEZING: 0

## 2019-06-06 NOTE — PROGRESS NOTES
Chief Complaint   Patient presents with    COPD    Leg Swelling     left knee, pt states this has been ongoing for 2 weeks    Medication Refill         Олег Bowling  here today for follow up on chronic medical problems, go over labs and/or diagnostic studies, and medication refills. COPD; Leg Swelling (left knee, pt states this has been ongoing for 2 weeks); and Medication Refill      HPI: Patient is here for follow-up for lab work and COPD. Patient reports her symptoms has improved on inhalers, denies any exacerbation . Patient has cut back on smoking to 5 cigarettes per day. She is on nicotine gums . Hyperlipidemia on statins. patient is complaining of left knee pain started 2-3 weeks before after she had trauma. Patient reports she twisted her left leg and since then she has noticed swelling, and pain. The pain is about 7-8 on scale, nonradiating around the knee joint associated with swelling. She denies any redness. Hematuria has resolved, normal urine test .        /65   Pulse 78   Ht 5' 5\" (1.651 m)   Wt 158 lb (71.7 kg)   LMP 04/16/2016   SpO2 96%   BMI 26.29 kg/m²    Body mass index is 26.29 kg/m². Wt Readings from Last 3 Encounters:   06/06/19 158 lb (71.7 kg)   01/24/19 155 lb 12.8 oz (70.7 kg)   10/24/18 159 lb 12.8 oz (72.5 kg)        [x]Negative depression screening. PHQ Scores 8/13/2018   PHQ2 Score 0   PHQ9 Score 0      []1-4 = Minimal depression   []5-9 = Milddepression   []10-14 = Moderate depression   []15-19 = Moderately severe depression   []20-27 = Severe depression    Discussed testing with the patient and all questions fully answered.     Hospital Outpatient Visit on 01/24/2019   Component Date Value Ref Range Status    Color, UA 01/24/2019 YELLOW  YEL Final    Turbidity UA 01/24/2019 CLEAR  CLEAR Final    Glucose, Ur 01/24/2019 NEGATIVE  NEG Final    Bilirubin Urine 01/24/2019 NEGATIVE  NEG Final    Ketones, Urine 01/24/2019 NEGATIVE  NEG Final    Specific Gravity, UA 01/24/2019 1.006  1.000 - 1.030 Final    Urine Hgb 01/24/2019 NEGATIVE  NEG Final    pH, UA 01/24/2019 7.0  5.0 - 8.0 Final    Protein, UA 01/24/2019 NEGATIVE  NEG Final    Urobilinogen, Urine 01/24/2019 Normal  NORM Final    Nitrite, Urine 01/24/2019 NEGATIVE  NEG Final    Leukocyte Esterase, Urine 01/24/2019 NEGATIVE  NEG Final    Urinalysis Comments 01/24/2019 NOT REPORTED   Final    - 01/24/2019        Final    WBC, UA 01/24/2019 0 TO 2  /HPF Final    RBC, UA 01/24/2019 0 TO 2  /HPF Final    Casts UA 01/24/2019 NOT REPORTED  /LPF Final    Crystals UA 01/24/2019 NOT REPORTED  NONE /HPF Final    Epithelial Cells UA 01/24/2019 5 TO 10  /HPF Final    Renal Epithelial, Urine 01/24/2019 NOT REPORTED  0 /HPF Final    Bacteria, UA 01/24/2019 FEW* NONE Final    Mucus, UA 01/24/2019 NOT REPORTED  NONE Final    Trichomonas, UA 01/24/2019 NOT REPORTED  NONE Final    Amorphous, UA 01/24/2019 NOT REPORTED  NONE Final    Other Observations UA 01/24/2019 NOT REPORTED  NREQ Final    Yeast, UA 01/24/2019 NOT REPORTED  NONE Final    Cholesterol 01/24/2019 210* <200 mg/dL Final    Comment:    Cholesterol Guidelines:      <200  Desirable   200-240  Borderline      >240  Undesirable         HDL 01/24/2019 39* >40 mg/dL Final    Comment:    HDL Guidelines:    <40     Undesirable   40-59    Borderline    >59     Desirable         LDL Cholesterol 01/24/2019 151* 0 - 130 mg/dL Final    Comment:    LDL Guidelines:     <100    Desirable   100-129   Near to/above Desirable   130-159   Borderline      >159   Undesirable     Direct (measured) LDL and calculated LDL are not interchangeable tests.  Chol/HDL Ratio 01/24/2019 5.4* <5 Final            Triglycerides 01/24/2019 99  <150 mg/dL Final    Comment:    Triglyceride Guidelines:     <150   Desirable   150-199  Borderline   200-499  High     >499   Very high   Based on AHA Guidelines for fasting triglyceride, October 2012.          VLDL 01/24/2019 NOT REPORTED  1 - 30 mg/dL Final         Most recent labs reviewed:     Lab Results   Component Value Date    WBC 7.5 08/08/2018    HGB 16.5 (H) 08/08/2018    HCT 47.7 (H) 08/08/2018    MCV 85.3 08/08/2018     08/08/2018       @BRIEFLAB(NA,K,CL,CO2,BUN,CREATININE,GLUCOSE,CALCIUM)@     Lab Results   Component Value Date    ALT 20 06/24/2015    AST 27 06/24/2015    ALKPHOS 107 (H) 06/24/2015    BILITOT <0.15 (L) 06/24/2015       Lab Results   Component Value Date    TSH 0.64 08/08/2018       Lab Results   Component Value Date    CHOL 210 (H) 01/24/2019    CHOL 303 (H) 08/08/2018    CHOL 400 (H) 06/23/2016     Lab Results   Component Value Date    TRIG 99 01/24/2019    TRIG 162 (H) 08/08/2018    TRIG 252 (H) 06/23/2016     Lab Results   Component Value Date    HDL 39 (L) 01/24/2019    HDL 47 08/08/2018    HDL 50 06/23/2016     Lab Results   Component Value Date    LDLCHOLESTEROL 151 (H) 01/24/2019    LDLCHOLESTEROL 224 (H) 08/08/2018    LDLCHOLESTEROL 300 (H) 06/23/2016     Lab Results   Component Value Date    VLDL NOT REPORTED 01/24/2019    VLDL NOT REPORTED 08/08/2018    VLDL NOT REPORTED 06/23/2016     Lab Results   Component Value Date    CHOLHDLRATIO 5.4 (H) 01/24/2019    CHOLHDLRATIO 6.4 (H) 08/08/2018    CHOLHDLRATIO 8.0 (H) 06/23/2016       Lab Results   Component Value Date    LABA1C 5.8 01/24/2019       No results found for: YATPCIFD69    No results found for: FOLATE    No results found for: IRON, TIBC, FERRITIN    No results found for: VITD25          Current Outpatient Medications   Medication Sig Dispense Refill    hydrocortisone 2.5 % cream APPLY TO AFFECTED AREA BID  3    zoster recombinant adjuvanted vaccine (SHINGRIX) 50 MCG/0.5ML SUSR injection Inject 0.5 mLs into the muscle once for 1 dose 50 MCG IM then repeat 2-6 months.  1 each 1    albuterol sulfate HFA (VENTOLIN HFA) 108 (90 Base) MCG/ACT inhaler Inhale 2 puffs into the lungs every 6 hours as needed for Wheezing 1 Inhaler 3  aspirin (ASPIRIN LOW DOSE) 81 MG EC tablet Take 1 tablet by mouth daily 30 tablet 3    atorvastatin (LIPITOR) 40 MG tablet Take 1 tablet by mouth daily 30 tablet 0    fluticasone (FLONASE) 50 MCG/ACT nasal spray 1 spray by Nasal route 2 times daily for 14 days 1 Bottle 2    guaiFENesin (MUCINEX) 600 MG extended release tablet Take 1 tablet by mouth 2 times daily 60 tablet 0    nicotine (NICODERM CQ) 14 MG/24HR Place 1 patch onto the skin every 24 hours 30 patch 3    nicotine polacrilex (NICORETTE) 2 MG gum Take 1 each by mouth as needed for Smoking cessation 110 each 3    tiotropium (SPIRIVA RESPIMAT) 1.25 MCG/ACT AERS inhaler Inhale 2 puffs into the lungs daily 1 Inhaler 3    ibuprofen (ADVIL;MOTRIN) 800 MG tablet Take 1 tablet by mouth every 8 hours as needed for Pain 30 tablet 0    lidocaine (LMX) 4 % cream Apply topically every 8 hrs as needed for pain 45 g 3    Elastic Bandages & Supports (KNEE BRACE/HINGED/LARGE) MISC Use daily for knee pain 1 each 0    Blood Pressure Monitor KIT TAKE BLOOD PRESSURE DAILY 1 kit 0    loratadine (CLARITIN) 10 MG tablet Take 1 tablet by mouth daily 30 tablet 0     No current facility-administered medications for this visit. Social History     Socioeconomic History    Marital status: Single     Spouse name: Not on file    Number of children: Not on file    Years of education: Not on file    Highest education level: Not on file   Occupational History    Not on file   Social Needs    Financial resource strain: Not on file    Food insecurity:     Worry: Not on file     Inability: Not on file    Transportation needs:     Medical: Not on file     Non-medical: Not on file   Tobacco Use    Smoking status: Current Some Day Smoker     Packs/day: 1.00     Years: 28.00     Pack years: 28.00     Types: Cigarettes    Smokeless tobacco: Never Used    Tobacco comment: down to 5 cigs per day   Substance and Sexual Activity    Alcohol use:  No Alcohol/week: 0.0 oz    Drug use: No    Sexual activity: Not on file   Lifestyle    Physical activity:     Days per week: Not on file     Minutes per session: Not on file    Stress: Not on file   Relationships    Social connections:     Talks on phone: Not on file     Gets together: Not on file     Attends Alevism service: Not on file     Active member of club or organization: Not on file     Attends meetings of clubs or organizations: Not on file     Relationship status: Not on file    Intimate partner violence:     Fear of current or ex partner: Not on file     Emotionally abused: Not on file     Physically abused: Not on file     Forced sexual activity: Not on file   Other Topics Concern    Not on file   Social History Narrative    Not on file     Ready to quit: Yes  Counseling given: Yes  Comment: down to 5 cigs per day        Family History   Problem Relation Age of Onset    Asthma Father     Cancer Father         colon    Diabetes Father     COPD Father     Heart Failure Father     Seizures Mother              -rest of complaints with corresponding details per ROS    The patient's past medical, surgical, social, and family history as well as her current medications and allergies were reviewed as documented intoday's encounter. Review of Systems   Constitutional: Negative for activity change, appetite change, fatigue, fever and unexpected weight change. HENT: Positive for congestion and sinus pressure. Negative for postnasal drip, rhinorrhea and sinus pain. Eyes: Negative for visual disturbance. Respiratory: Negative for cough, shortness of breath and wheezing. Cardiovascular: Negative for chest pain, palpitations and leg swelling. Gastrointestinal: Negative for abdominal pain, blood in stool and diarrhea. Endocrine: Negative for polyphagia and polyuria. Musculoskeletal: Positive for arthralgias, gait problem and joint swelling.  Negative for back pain, myalgias, neck pain and neck stiffness. Knee pain   Skin: Negative for color change and rash. Neurological: Positive for numbness. Negative for dizziness, weakness, light-headedness and headaches. Psychiatric/Behavioral: Negative for agitation, behavioral problems, decreased concentration, dysphoric mood, hallucinations, sleep disturbance and suicidal ideas. The patient is not nervous/anxious and is not hyperactive. Physical Exam   Musculoskeletal:        Right knee: She exhibits normal range of motion, no swelling and no effusion. No tenderness found. No medial joint line and no lateral joint line tenderness noted. Left knee: She exhibits decreased range of motion, swelling, abnormal alignment and bony tenderness. She exhibits no effusion, no deformity and no erythema. Tenderness found. Medial joint line tenderness noted. No patellar tendon tenderness noted. PHYSICAL EXAM:   VITALS:   Vitals:    06/06/19 0833   BP: 104/65   Pulse: 78   SpO2: 96%     GENERAL:  Patient is a well-developed, well-nourished female  in no acute distress, alert and oriented x3, appropriate and pleasant conversation. HEAD: Normocephalic, atraumatic. EYES: Pupils equal, round and reactive to light and accommodation, extraocular   movements intact. ENT: Moist mucous membranes. No erythema is noted. NECK: Supple. No masses. No lymphadenopathy. CARDIOVASCULAR: Regular rate and rhythm. PULMONARY: Lungs are clear to auscultation bilaterally. ABDOMEN: Soft, nontender, nondistended. Positive bowel sounds. NEUROLOGIC: Cranial nerves II through XII grossly intact. No focal deficits are noted. ASSESSMENT AND PLAN      1. Simple chronic bronchitis (HCC)  Stable continue same medications  - guaiFENesin (MUCINEX) 600 MG extended release tablet; Take 1 tablet by mouth 2 times daily  Dispense: 60 tablet; Refill: 0  - nicotine polacrilex (NICORETTE) 2 MG gum;  Take 1 each by mouth as needed for Smoking cessation Dispense: 110 each; Refill: 3  - tiotropium (SPIRIVA RESPIMAT) 1.25 MCG/ACT AERS inhaler; Inhale 2 puffs into the lungs daily  Dispense: 1 Inhaler; Refill: 3    2. Acute pain of left knee  X-rays, handout provided for exercises, knee support pain control. Discussed about the physical therapy after x-ray results  - XR KNEE LEFT (1-2 VIEWS); Future  - ibuprofen (ADVIL;MOTRIN) 800 MG tablet; Take 1 tablet by mouth every 8 hours as needed for Pain  Dispense: 30 tablet; Refill: 0  - lidocaine (LMX) 4 % cream; Apply topically every 8 hrs as needed for pain  Dispense: 45 g; Refill: 3  - Elastic Bandages & Supports (KNEE BRACE/HINGED/LARGE) MISC; Use daily for knee pain  Dispense: 1 each; Refill: 0    3. Mixed hyperlipidemia  Stable continue same medications  - aspirin (ASPIRIN LOW DOSE) 81 MG EC tablet; Take 1 tablet by mouth daily  Dispense: 30 tablet; Refill: 3  - atorvastatin (LIPITOR) 40 MG tablet; Take 1 tablet by mouth daily  Dispense: 30 tablet; Refill: 0    4. Acute bacterial sinusitis  Stable continue same  - fluticasone (FLONASE) 50 MCG/ACT nasal spray; 1 spray by Nasal route 2 times daily for 14 days  Dispense: 1 Bottle; Refill: 2    5. Personal history of smoking  Improving continue nicotine patches and gums  - nicotine (NICODERM CQ) 14 MG/24HR; Place 1 patch onto the skin every 24 hours  Dispense: 30 patch; Refill: 3    6. Need for prophylactic vaccination and inoculation against varicella    - zoster recombinant adjuvanted vaccine [facility]) 50 MCG/0.5ML SUSR injection; Inject 0.5 mLs into the muscle once for 1 dose 50 MCG IM then repeat 2-6 months. Dispense: 1 each;  Refill: 1      Orders Placed This Encounter   Procedures    XR KNEE LEFT (1-2 VIEWS)     Standing Status:   Future     Number of Occurrences:   1     Standing Expiration Date:   6/6/2020     Order Specific Question:   Reason for exam:     Answer:   pain         Medications Discontinued During This Encounter   Medication Reason    albuterol sulfate HFA (VENTOLIN HFA) 108 (90 Base) MCG/ACT inhaler REORDER    aspirin (ASPIRIN LOW DOSE) 81 MG EC tablet REORDER    atorvastatin (LIPITOR) 40 MG tablet REORDER    fluticasone (FLONASE) 50 MCG/ACT nasal spray REORDER    guaiFENesin (MUCINEX) 600 MG extended release tablet REORDER    nicotine (NICODERM CQ) 14 MG/24HR REORDER    nicotine polacrilex (NICORETTE) 2 MG gum REORDER    tiotropium (SPIRIVA RESPIMAT) 1.25 MCG/ACT AERS inhaler REORDER       Yancysunita Rodriguez received counseling on the following healthy behaviors: nutrition, exercise, medication adherence and tobacco cessation  Reviewed prior labs and health maintenance  Continue current medications, diet and exercise. Discussed use, benefit, and side effects of prescribed medications. Barriers to medication compliance addressed. Patient given educational materials - see patient instructions  Was a self-tracking handout given in paper form or via [employer]? Yes    Requested Prescriptions     Signed Prescriptions Disp Refills    zoster recombinant adjuvanted vaccine (SHINGRIX) 50 MCG/0.5ML SUSR injection 1 each 1     Sig: Inject 0.5 mLs into the muscle once for 1 dose 50 MCG IM then repeat 2-6 months.     albuterol sulfate HFA (VENTOLIN HFA) 108 (90 Base) MCG/ACT inhaler 1 Inhaler 3     Sig: Inhale 2 puffs into the lungs every 6 hours as needed for Wheezing    aspirin (ASPIRIN LOW DOSE) 81 MG EC tablet 30 tablet 3     Sig: Take 1 tablet by mouth daily    atorvastatin (LIPITOR) 40 MG tablet 30 tablet 0     Sig: Take 1 tablet by mouth daily    fluticasone (FLONASE) 50 MCG/ACT nasal spray 1 Bottle 2     Si spray by Nasal route 2 times daily for 14 days    guaiFENesin (MUCINEX) 600 MG extended release tablet 60 tablet 0     Sig: Take 1 tablet by mouth 2 times daily    nicotine (NICODERM CQ) 14 MG/24HR 30 patch 3     Sig: Place 1 patch onto the skin every 24 hours    nicotine polacrilex (NICORETTE) 2 MG gum 110 each 3     Sig: Take 1 each by mouth as needed for Smoking cessation    tiotropium (SPIRIVA RESPIMAT) 1.25 MCG/ACT AERS inhaler 1 Inhaler 3     Sig: Inhale 2 puffs into the lungs daily    ibuprofen (ADVIL;MOTRIN) 800 MG tablet 30 tablet 0     Sig: Take 1 tablet by mouth every 8 hours as needed for Pain    lidocaine (LMX) 4 % cream 45 g 3     Sig: Apply topically every 8 hrs as needed for pain    Elastic Bandages & Supports (KNEE BRACE/HINGED/LARGE) MISC 1 each 0     Sig: Use daily for knee pain       All patient questions answered. Patient voiced understanding. Quality Measures    Body mass index is 26.29 kg/m². Elevated. Weight control planned discussed Healthy diet and regular exercise. BP: 104/65 Blood pressure is normal. Treatment plan consists of No treatment change needed. Lab Results   Component Value Date    [id number] 426 (H) 01/24/2019    (goal LDL reduction with dx if diabetes is 50% LDL reduction)      PHQ Scores 8/13/2018   PHQ2 Score 0   PHQ9 Score 0     Interpretation of Total Score Depression Severity: 1-4 = Minimal depression, 5-9 = Mild depression, 10-14 = Moderate depression, 15-19 = Moderately severe depression, 20-27 = Severe depression    The patient'spast medical, surgical, social, and family history as well as her   current medications and allergies were reviewed as documented in today's encounter. Medications, labs, diagnostic studies, consultations andfollow-up as documented in this encounter. Return in about 2 weeks (around 6/20/2019). Patient wasseen with total face to face time of 25 minutes. More than 50% of this visit was counseling and education. Future Appointments   Date Time Provider Tiki Lewis   8/6/2019 10:45 AM Christ Peña MD [facility]     This note was completed by using the assistance of a speech-recognition program. However, inadvertent computerized transcription errors may be present.  Althoughevery effort was made to ensure accuracy, no guarantees can be provided that every mistake has been identified and corrected by editing.   Electronically signed by Shari Roca MD on 6/6/2019  11:08 AM

## 2019-06-06 NOTE — PROGRESS NOTES
Visit Information    Have you changed or started any medications since your last visit including any over-the-counter medicines, vitamins, or herbal medicines? no   Are you having any side effects from any of your medications? -  no  Have you stopped taking any of your medications? Is so, why? -  no    Have you seen any other physician or provider since your last visit? No  Have you had any other diagnostic tests since your last visit? No  Have you been seen in the emergency room and/or had an admission to a hospital since we last saw you? No  Have you had your routine dental cleaning in the past 6 months? no    Have you activated your [employer] account? If not, what are your barriers?  Yes     Patient Care Team:  Praful Bryan MD as PCP - General (Family Medicine)  Praful Bryan MD as PCP - [facility]  Carlie Pop MD as Surgeon (Colon and Rectal Surgery)    Medical History Review  Past Medical, Family, and Social History reviewed and does contribute to the patient presenting condition    Health Maintenance   Topic Date Due    Shingles Vaccine (1 of 2) 02/14/2017    Colon Cancer Screen FIT/FOBT  08/30/2019    Flu vaccine (Season Ended) 09/01/2019    A1C test (Diabetic or Prediabetic)  01/24/2020    Breast cancer screen  09/12/2020    Cervical cancer screen  08/23/2021    Lipid screen  01/24/2024    DTaP/Tdap/Td vaccine (2 - Td) 10/14/2027    Pneumococcal 0-64 years Vaccine  Completed    HIV screen  Completed

## 2019-06-07 ENCOUNTER — TELEPHONE (OUTPATIENT)
Dept: FAMILY MEDICINE CLINIC | Age: 54
End: 2019-06-07

## 2019-06-10 ENCOUNTER — PATIENT MESSAGE (OUTPATIENT)
Dept: FAMILY MEDICINE CLINIC | Age: 54
End: 2019-06-10

## 2019-07-12 DIAGNOSIS — E78.2 MIXED HYPERLIPIDEMIA: ICD-10-CM

## 2019-07-12 RX ORDER — ATORVASTATIN CALCIUM 40 MG/1
40 TABLET, FILM COATED ORAL DAILY
Qty: 30 TABLET | Refills: 0 | Status: SHIPPED | OUTPATIENT
Start: 2019-07-12 | End: 2019-08-09 | Stop reason: SDUPTHER

## 2019-07-12 NOTE — TELEPHONE ENCOUNTER
Please Approve or Refuse.   Send to Pharmacy per Pt's Request:      Next Visit Date:  8/6/2019   Last Visit Date: 6/6/2019    Hemoglobin A1C (%)   Date Value   01/24/2019 5.8   08/23/2018 5.8             ( goal A1C is < 7)   BP Readings from Last 3 Encounters:   06/06/19 104/65   01/24/19 114/67   10/24/18 115/74          (goal 120/80)  BUN   Date Value Ref Range Status   08/08/2018 11 6 - 20 mg/dL Final     CREATININE   Date Value Ref Range Status   08/08/2018 0.65 0.50 - 0.90 mg/dL Final     Potassium   Date Value Ref Range Status   08/08/2018 4.8 3.7 - 5.3 mmol/L Final     Comment:     SPECIMEN MODERATELY HEMOLYZED, RESULTS MAY BE ADVERSELY AFFECTED

## 2019-07-22 ENCOUNTER — APPOINTMENT (OUTPATIENT)
Dept: CT IMAGING | Age: 54
End: 2019-07-22
Payer: MEDICARE

## 2019-07-22 ENCOUNTER — HOSPITAL ENCOUNTER (EMERGENCY)
Age: 54
Discharge: HOME | End: 2019-07-22
Attending: EMERGENCY MEDICINE
Payer: MEDICARE

## 2019-07-22 ENCOUNTER — APPOINTMENT (OUTPATIENT)
Dept: ULTRASOUND IMAGING | Age: 54
End: 2019-07-22
Payer: MEDICARE

## 2019-07-22 VITALS
DIASTOLIC BLOOD PRESSURE: 80 MMHG | WEIGHT: 158 LBS | BODY MASS INDEX: 26.33 KG/M2 | TEMPERATURE: 98 F | HEART RATE: 65 BPM | RESPIRATION RATE: 16 BRPM | SYSTOLIC BLOOD PRESSURE: 123 MMHG | HEIGHT: 65 IN | OXYGEN SATURATION: 96 %

## 2019-07-22 DIAGNOSIS — N93.9 VAGINAL BLEEDING: ICD-10-CM

## 2019-07-22 DIAGNOSIS — D21.9 FIBROID: ICD-10-CM

## 2019-07-22 DIAGNOSIS — E27.8 ADRENAL NODULE (HCC): Primary | ICD-10-CM

## 2019-07-22 LAB
-: ABNORMAL
ABO/RH: NORMAL
ABSOLUTE EOS #: 0.2 K/UL (ref 0–0.4)
ABSOLUTE IMMATURE GRANULOCYTE: ABNORMAL K/UL (ref 0–0.3)
ABSOLUTE LYMPH #: 3 K/UL (ref 1–4.8)
ABSOLUTE MONO #: 0.5 K/UL (ref 0.1–1.3)
ALBUMIN SERPL-MCNC: 4 G/DL (ref 3.5–5.2)
ALBUMIN/GLOBULIN RATIO: ABNORMAL (ref 1–2.5)
ALP BLD-CCNC: 103 U/L (ref 35–104)
ALT SERPL-CCNC: 16 U/L (ref 5–33)
AMORPHOUS: ABNORMAL
ANION GAP SERPL CALCULATED.3IONS-SCNC: 11 MMOL/L (ref 9–17)
ANTIBODY SCREEN: NEGATIVE
ARM BAND NUMBER: NORMAL
AST SERPL-CCNC: 19 U/L
BACTERIA: ABNORMAL
BASOPHILS # BLD: 1 % (ref 0–2)
BASOPHILS ABSOLUTE: 0.1 K/UL (ref 0–0.2)
BILIRUB SERPL-MCNC: 0.32 MG/DL (ref 0.3–1.2)
BILIRUBIN URINE: NEGATIVE
BUN BLDV-MCNC: 7 MG/DL (ref 6–20)
BUN/CREAT BLD: ABNORMAL (ref 9–20)
CALCIUM SERPL-MCNC: 8.9 MG/DL (ref 8.6–10.4)
CASTS UA: ABNORMAL /LPF
CHLORIDE BLD-SCNC: 110 MMOL/L (ref 98–107)
CO2: 23 MMOL/L (ref 20–31)
COLOR: YELLOW
COMMENT UA: ABNORMAL
CREAT SERPL-MCNC: 0.52 MG/DL (ref 0.5–0.9)
CRYSTALS, UA: ABNORMAL /HPF
DIFFERENTIAL TYPE: ABNORMAL
DIRECT EXAM: NORMAL
EOSINOPHILS RELATIVE PERCENT: 2 % (ref 0–4)
EPITHELIAL CELLS UA: ABNORMAL /HPF
EXPIRATION DATE: NORMAL
GFR AFRICAN AMERICAN: >60 ML/MIN
GFR NON-AFRICAN AMERICAN: >60 ML/MIN
GFR SERPL CREATININE-BSD FRML MDRD: ABNORMAL ML/MIN/{1.73_M2}
GFR SERPL CREATININE-BSD FRML MDRD: ABNORMAL ML/MIN/{1.73_M2}
GLUCOSE BLD-MCNC: 87 MG/DL (ref 70–99)
GLUCOSE URINE: NEGATIVE
HCG QUALITATIVE: NEGATIVE
HCT VFR BLD CALC: 47.4 % (ref 36–46)
HEMOGLOBIN: 15.9 G/DL (ref 12–16)
IMMATURE GRANULOCYTES: ABNORMAL %
INR BLD: 0.9
KETONES, URINE: NEGATIVE
LEUKOCYTE ESTERASE, URINE: NEGATIVE
LYMPHOCYTES # BLD: 33 % (ref 24–44)
Lab: NORMAL
MCH RBC QN AUTO: 29.5 PG (ref 26–34)
MCHC RBC AUTO-ENTMCNC: 33.6 G/DL (ref 31–37)
MCV RBC AUTO: 87.7 FL (ref 80–100)
MONOCYTES # BLD: 6 % (ref 1–7)
MUCUS: ABNORMAL
NITRITE, URINE: NEGATIVE
NRBC AUTOMATED: ABNORMAL PER 100 WBC
OTHER OBSERVATIONS UA: ABNORMAL
PDW BLD-RTO: 15.3 % (ref 11.5–14.9)
PH UA: 5.5 (ref 5–8)
PLATELET # BLD: 421 K/UL (ref 150–450)
PLATELET ESTIMATE: ABNORMAL
PMV BLD AUTO: 8.3 FL (ref 6–12)
POTASSIUM SERPL-SCNC: 4 MMOL/L (ref 3.7–5.3)
PROTEIN UA: NEGATIVE
PROTHROMBIN TIME: 12.3 SEC (ref 11.8–14.6)
RBC # BLD: 5.4 M/UL (ref 4–5.2)
RBC # BLD: ABNORMAL 10*6/UL
RBC UA: ABNORMAL /HPF
RENAL EPITHELIAL, UA: ABNORMAL /HPF
SEG NEUTROPHILS: 58 % (ref 36–66)
SEGMENTED NEUTROPHILS ABSOLUTE COUNT: 5.2 K/UL (ref 1.3–9.1)
SODIUM BLD-SCNC: 144 MMOL/L (ref 135–144)
SPECIFIC GRAVITY UA: 1.01 (ref 1–1.03)
SPECIMEN DESCRIPTION: NORMAL
TOTAL PROTEIN: 7.1 G/DL (ref 6.4–8.3)
TRICHOMONAS: ABNORMAL
TURBIDITY: CLEAR
URINE HGB: ABNORMAL
UROBILINOGEN, URINE: NORMAL
WBC # BLD: 9 K/UL (ref 3.5–11)
WBC # BLD: ABNORMAL 10*3/UL
WBC UA: ABNORMAL /HPF
YEAST: ABNORMAL

## 2019-07-22 PROCEDURE — 93976 VASCULAR STUDY: CPT

## 2019-07-22 PROCEDURE — 84703 CHORIONIC GONADOTROPIN ASSAY: CPT

## 2019-07-22 PROCEDURE — 2580000003 HC RX 258: Performed by: EMERGENCY MEDICINE

## 2019-07-22 PROCEDURE — 99284 EMERGENCY DEPT VISIT MOD MDM: CPT

## 2019-07-22 PROCEDURE — 85025 COMPLETE CBC W/AUTO DIFF WBC: CPT

## 2019-07-22 PROCEDURE — 87491 CHLMYD TRACH DNA AMP PROBE: CPT

## 2019-07-22 PROCEDURE — 6360000004 HC RX CONTRAST MEDICATION: Performed by: EMERGENCY MEDICINE

## 2019-07-22 PROCEDURE — 81001 URINALYSIS AUTO W/SCOPE: CPT

## 2019-07-22 PROCEDURE — 86900 BLOOD TYPING SEROLOGIC ABO: CPT

## 2019-07-22 PROCEDURE — 87510 GARDNER VAG DNA DIR PROBE: CPT

## 2019-07-22 PROCEDURE — 76830 TRANSVAGINAL US NON-OB: CPT

## 2019-07-22 PROCEDURE — 85610 PROTHROMBIN TIME: CPT

## 2019-07-22 PROCEDURE — 36415 COLL VENOUS BLD VENIPUNCTURE: CPT

## 2019-07-22 PROCEDURE — 87660 TRICHOMONAS VAGIN DIR PROBE: CPT

## 2019-07-22 PROCEDURE — 80053 COMPREHEN METABOLIC PANEL: CPT

## 2019-07-22 PROCEDURE — 87086 URINE CULTURE/COLONY COUNT: CPT

## 2019-07-22 PROCEDURE — 87591 N.GONORRHOEAE DNA AMP PROB: CPT

## 2019-07-22 PROCEDURE — 74177 CT ABD & PELVIS W/CONTRAST: CPT

## 2019-07-22 PROCEDURE — 86901 BLOOD TYPING SEROLOGIC RH(D): CPT

## 2019-07-22 PROCEDURE — 87480 CANDIDA DNA DIR PROBE: CPT

## 2019-07-22 PROCEDURE — 86850 RBC ANTIBODY SCREEN: CPT

## 2019-07-22 RX ORDER — 0.9 % SODIUM CHLORIDE 0.9 %
80 INTRAVENOUS SOLUTION INTRAVENOUS ONCE
Status: COMPLETED | OUTPATIENT
Start: 2019-07-22 | End: 2019-07-22

## 2019-07-22 RX ADMIN — IOVERSOL 75 ML: 741 INJECTION INTRA-ARTERIAL; INTRAVENOUS at 17:17

## 2019-07-22 RX ADMIN — SODIUM CHLORIDE 80 ML: 9 INJECTION, SOLUTION INTRAVENOUS at 17:17

## 2019-07-22 ASSESSMENT — PAIN DESCRIPTION - ORIENTATION: ORIENTATION: LOWER

## 2019-07-22 ASSESSMENT — PAIN DESCRIPTION - PAIN TYPE: TYPE: ACUTE PAIN

## 2019-07-22 ASSESSMENT — PAIN DESCRIPTION - DESCRIPTORS: DESCRIPTORS: CRAMPING

## 2019-07-22 ASSESSMENT — ENCOUNTER SYMPTOMS: ABDOMINAL PAIN: 1

## 2019-07-22 ASSESSMENT — PAIN DESCRIPTION - ONSET: ONSET: ON-GOING

## 2019-07-22 ASSESSMENT — PAIN SCALES - GENERAL: PAINLEVEL_OUTOF10: 5

## 2019-07-22 ASSESSMENT — PAIN DESCRIPTION - LOCATION: LOCATION: ABDOMEN

## 2019-07-22 NOTE — ED PROVIDER NOTES
components:    Bacteria, UA FEW (*)     All other components within normal limits   VAGINITIS DNA PROBE   URINE CULTURE CLEAN CATCH   C.TRACHOMATIS N.GONORRHOEAE DNA   CBC WITH AUTO DIFFERENTIAL   COMPREHENSIVE METABOLIC PANEL   PROTIME-INR   HCG, SERUM, QUALITATIVE   TYPE AND SCREEN       EMERGENCY DEPARTMENTCOURSE:         Vitals:    Vitals:    07/22/19 1206 07/22/19 1346 07/22/19 1520   BP:  120/68    Pulse: 78 60 64   Resp: 14 16    Temp: 98 °F (36.7 °C)     TempSrc: Oral     SpO2: 98% 97%    Weight: 158 lb (71.7 kg)     Height: 5' 5\" (1.651 m)           FINAL IMPRESSION      1.  Vaginal bleeding          DISPOSITION/PLAN   Karina Vilchis MD  Attending Emergency Physician                    Leilani Silver MD  07/22/19 1340

## 2019-07-22 NOTE — ED NOTES

## 2019-07-23 ENCOUNTER — TELEPHONE (OUTPATIENT)
Dept: FAMILY MEDICINE CLINIC | Age: 54
End: 2019-07-23

## 2019-07-23 LAB
C TRACH DNA GENITAL QL NAA+PROBE: NEGATIVE
CULTURE: NORMAL
Lab: NORMAL
N. GONORRHOEAE DNA: NEGATIVE
SPECIMEN DESCRIPTION: NORMAL
SPECIMEN DESCRIPTION: NORMAL

## 2019-08-01 ENCOUNTER — OFFICE VISIT (OUTPATIENT)
Dept: FAMILY MEDICINE CLINIC | Age: 54
End: 2019-08-01
Payer: MEDICARE

## 2019-08-01 VITALS
HEART RATE: 68 BPM | OXYGEN SATURATION: 97 % | DIASTOLIC BLOOD PRESSURE: 84 MMHG | WEIGHT: 161.2 LBS | BODY MASS INDEX: 26.86 KG/M2 | SYSTOLIC BLOOD PRESSURE: 132 MMHG | HEIGHT: 65 IN

## 2019-08-01 DIAGNOSIS — D25.1 INTRAMURAL LEIOMYOMA OF UTERUS: Primary | ICD-10-CM

## 2019-08-01 DIAGNOSIS — R73.03 PREDIABETES: ICD-10-CM

## 2019-08-01 DIAGNOSIS — E78.2 MIXED HYPERLIPIDEMIA: ICD-10-CM

## 2019-08-01 DIAGNOSIS — J41.0 SIMPLE CHRONIC BRONCHITIS (HCC): ICD-10-CM

## 2019-08-01 DIAGNOSIS — D35.00 ADRENAL ADENOMA, UNSPECIFIED LATERALITY: ICD-10-CM

## 2019-08-01 DIAGNOSIS — N95.0 POSTMENOPAUSAL BLEEDING: ICD-10-CM

## 2019-08-01 LAB — HBA1C MFR BLD: 5.6 %

## 2019-08-01 PROCEDURE — 3023F SPIROM DOC REV: CPT | Performed by: FAMILY MEDICINE

## 2019-08-01 PROCEDURE — 83036 HEMOGLOBIN GLYCOSYLATED A1C: CPT | Performed by: FAMILY MEDICINE

## 2019-08-01 PROCEDURE — G8427 DOCREV CUR MEDS BY ELIG CLIN: HCPCS | Performed by: FAMILY MEDICINE

## 2019-08-01 PROCEDURE — 99214 OFFICE O/P EST MOD 30 MIN: CPT | Performed by: FAMILY MEDICINE

## 2019-08-01 PROCEDURE — G8419 CALC BMI OUT NRM PARAM NOF/U: HCPCS | Performed by: FAMILY MEDICINE

## 2019-08-01 PROCEDURE — 4004F PT TOBACCO SCREEN RCVD TLK: CPT | Performed by: FAMILY MEDICINE

## 2019-08-01 PROCEDURE — G8926 SPIRO NO PERF OR DOC: HCPCS | Performed by: FAMILY MEDICINE

## 2019-08-01 PROCEDURE — 3017F COLORECTAL CA SCREEN DOC REV: CPT | Performed by: FAMILY MEDICINE

## 2019-08-01 RX ORDER — ASPIRIN 81 MG/1
81 TABLET ORAL DAILY
Qty: 30 TABLET | Refills: 3 | Status: SHIPPED | OUTPATIENT
Start: 2019-08-01 | End: 2020-06-09

## 2019-08-01 ASSESSMENT — PATIENT HEALTH QUESTIONNAIRE - PHQ9
1. LITTLE INTEREST OR PLEASURE IN DOING THINGS: 0
2. FEELING DOWN, DEPRESSED OR HOPELESS: 0
SUM OF ALL RESPONSES TO PHQ QUESTIONS 1-9: 0
SUM OF ALL RESPONSES TO PHQ9 QUESTIONS 1 & 2: 0
SUM OF ALL RESPONSES TO PHQ QUESTIONS 1-9: 0

## 2019-08-01 ASSESSMENT — ENCOUNTER SYMPTOMS
SHORTNESS OF BREATH: 0
WHEEZING: 0
PHOTOPHOBIA: 0
SINUS PRESSURE: 0
ABDOMINAL DISTENTION: 0
RHINORRHEA: 0
BLOOD IN STOOL: 0
COUGH: 0
NAUSEA: 0
CONSTIPATION: 0

## 2019-08-01 NOTE — PROGRESS NOTES
07/22/2019 19  <32 U/L Final    Total Bilirubin 07/22/2019 0.32  0.3 - 1.2 mg/dL Final    Total Protein 07/22/2019 7.1  6.4 - 8.3 g/dL Final    Alb 07/22/2019 4.0  3.5 - 5.2 g/dL Final    Albumin/Globulin Ratio 07/22/2019 NOT REPORTED  1.0 - 2.5 Final    GFR Non- 07/22/2019 >60  >60 mL/min Final    GFR  07/22/2019 >60  >60 mL/min Final    GFR Comment 07/22/2019        Final    Comment: Average GFR for 52-63 years old:   80 mL/min/1.73sq m  Chronic Kidney Disease:   <60 mL/min/1.73sq m  Kidney failure:   <15 mL/min/1.73sq m              eGFR calculated using average adult body mass. Additional eGFR calculator available at:        [email or web address]            GFR Staging 07/22/2019 NOT REPORTED   Final    Protime 07/22/2019 12.3  11.8 - 14.6 sec Final    INR 07/22/2019 0.9   Final    Comment:       Non-therapeutic Range:     INR = 0.9-1.2  Therapeutic Range:    Moderate Anticoagulant Intensity:     INR = 2.0-3.0   High Anticoagulant Intensity:     INR = 2.5-3.5            Expiration Date 07/22/2019 07/25/2019   Final    Arm Band Number 07/22/2019 [id number]   Final    ABO/Rh 07/22/2019 O POSITIVE   Final    Antibody Screen 07/22/2019 NEGATIVE   Final    Color, UA 07/22/2019 YELLOW  YELLOW Final    Turbidity UA 07/22/2019 CLEAR  CLEAR Final    Glucose, Ur 07/22/2019 NEGATIVE  NEGATIVE Final    Bilirubin Urine 07/22/2019 NEGATIVE  NEGATIVE Final    Ketones, Urine 07/22/2019 NEGATIVE  NEGATIVE Final    Specific Gravity, UA 07/22/2019 1.013  1.000 - 1.030 Final    Urine Hgb 07/22/2019 LARGE* NEGATIVE Final    pH, UA 07/22/2019 5.5  5.0 - 8.0 Final    Protein, UA 07/22/2019 NEGATIVE  NEGATIVE Final    Urobilinogen, Urine 07/22/2019 Normal  Normal Final    Nitrite, Urine 07/22/2019 NEGATIVE  NEGATIVE Final    Leukocyte Esterase, Urine 07/22/2019 NEGATIVE  NEGATIVE Final    Urinalysis Comments 07/22/2019 NOT REPORTED   Final    hCG Qual Trichomonas vaginalis   Final    Direct Exam 07/22/2019 NEGATIVE for Gardnerella vaginalis   Final    Direct Exam 07/22/2019 Method of testing is a DNA probe intended for detection and identification of Candida species, Gardnerella vaginalis, and Trichomonas vaginalis nucleic acid in vaginal fluid specimens from patients with symptoms of vaginitis/vaginosis. Final    - 07/22/2019        Final    WBC, UA 07/22/2019 2 TO 5  /HPF Final    RBC, UA 07/22/2019 20 TO 50  /HPF Final    Casts UA 07/22/2019 NOT REPORTED  /LPF Final    Crystals UA 07/22/2019 NOT REPORTED  None /HPF Final    Epithelial Cells UA 07/22/2019 2 TO 5  /HPF Final    Renal Epithelial, Urine 07/22/2019 NOT REPORTED  0 /HPF Final    Bacteria, UA 07/22/2019 FEW* None Final    Mucus, UA 07/22/2019 NOT REPORTED  None Final    Trichomonas, UA 07/22/2019 NOT REPORTED  None Final    Amorphous, UA 07/22/2019 NOT REPORTED  None Final    Other Observations UA 07/22/2019 NOT REPORTED  NOT REQ.  Final    Yeast, UA 07/22/2019 NOT REPORTED  None Final         Most recent labs reviewed:     Lab Results   Component Value Date    WBC 9.0 07/22/2019    HGB 15.9 07/22/2019    HCT 47.4 (H) 07/22/2019    MCV 87.7 07/22/2019     07/22/2019       @BRIEFLAB(NA,K,CL,CO2,BUN,CREATININE,GLUCOSE,CALCIUM)@     Lab Results   Component Value Date    ALT 16 07/22/2019    AST 19 07/22/2019    ALKPHOS 103 07/22/2019    BILITOT 0.32 07/22/2019       Lab Results   Component Value Date    TSH 0.64 08/08/2018       Lab Results   Component Value Date    CHOL 210 (H) 01/24/2019    CHOL 303 (H) 08/08/2018    CHOL 400 (H) 06/23/2016     Lab Results   Component Value Date    TRIG 99 01/24/2019    TRIG 162 (H) 08/08/2018    TRIG 252 (H) 06/23/2016     Lab Results   Component Value Date    HDL 39 (L) 01/24/2019    HDL 47 08/08/2018    HDL 50 06/23/2016     Lab Results   Component Value Date    LDLCHOLESTEROL 151 (H) 01/24/2019    LDLCHOLESTEROL 224 (H) 08/08/2018 LDLCHOLESTEROL 300 (H) 06/23/2016     Lab Results   Component Value Date    VLDL NOT REPORTED 01/24/2019    VLDL NOT REPORTED 08/08/2018    VLDL NOT REPORTED 06/23/2016     Lab Results   Component Value Date    CHOLHDLRATIO 5.4 (H) 01/24/2019    CHOLHDLRATIO 6.4 (H) 08/08/2018    CHOLHDLRATIO 8.0 (H) 06/23/2016       Lab Results   Component Value Date    LABA1C 5.6 08/01/2019       No results found for: IRIYRHVV20    No results found for: FOLATE    No results found for: IRON, TIBC, FERRITIN    No results found for: VITD25          Current Outpatient Medications   Medication Sig Dispense Refill    aspirin (ASPIRIN LOW DOSE) 81 MG EC tablet Take 1 tablet by mouth daily 30 tablet 3    atorvastatin (LIPITOR) 40 MG tablet TAKE 1 TABLET BY MOUTH DAILY 30 tablet 0    hydrocortisone 2.5 % cream APPLY TO AFFECTED AREA BID  3    albuterol sulfate HFA (VENTOLIN HFA) 108 (90 Base) MCG/ACT inhaler Inhale 2 puffs into the lungs every 6 hours as needed for Wheezing 1 Inhaler 3    guaiFENesin (MUCINEX) 600 MG extended release tablet Take 1 tablet by mouth 2 times daily 60 tablet 0    nicotine (NICODERM CQ) 14 MG/24HR Place 1 patch onto the skin every 24 hours 30 patch 3    nicotine polacrilex (NICORETTE) 2 MG gum Take 1 each by mouth as needed for Smoking cessation 110 each 3    tiotropium (SPIRIVA RESPIMAT) 1.25 MCG/ACT AERS inhaler Inhale 2 puffs into the lungs daily 1 Inhaler 3    lidocaine (LMX) 4 % cream Apply topically every 8 hrs as needed for pain 45 g 3    Elastic Bandages & Supports (KNEE BRACE/HINGED/LARGE) MISC Use daily for knee pain 1 each 0    Blood Pressure Monitor KIT TAKE BLOOD PRESSURE DAILY 1 kit 0    fluticasone (FLONASE) 50 MCG/ACT nasal spray 1 spray by Nasal route 2 times daily for 14 days 1 Bottle 2    ibuprofen (ADVIL;MOTRIN) 800 MG tablet Take 1 tablet by mouth every 8 hours as needed for Pain 30 tablet 0    loratadine (CLARITIN) 10 MG tablet Take 1 tablet by mouth daily (Patient not taking:

## 2019-08-01 NOTE — PROGRESS NOTES
Visit Information    Have you changed or started any medications since your last visit including any over-the-counter medicines, vitamins, or herbal medicines? no   Are you having any side effects from any of your medications? -  no  Have you stopped taking any of your medications? Is so, why? -  no    Have you seen any other physician or provider since your last visit? No  Have you had any other diagnostic tests since your last visit? Yes - Records Obtained  Have you been seen in the emergency room and/or had an admission to a hospital since we last saw you? Yes - Records Obtained  Have you had your routine dental cleaning in the past 6 months? no    Have you activated your [employer] account? If not, what are your barriers?  Yes     Patient Care Team:  Sonam Funez MD as PCP - General (Family Medicine)  Sonam Funez MD as PCP - [facility]  Fabricio Enriquez MD as Surgeon (Colon and Rectal Surgery)    Medical History Review  Past Medical, Family, and Social History reviewed and does contribute to the patient presenting condition    Health Maintenance   Topic Date Due    Shingles Vaccine (1 of 2) 02/14/2017    Colon Cancer Screen FIT/FOBT  08/30/2019    Flu vaccine (1) 09/01/2019    A1C test (Diabetic or Prediabetic)  01/24/2020    Breast cancer screen  09/12/2020    Cervical cancer screen  08/23/2021    Lipid screen  01/24/2024    DTaP/Tdap/Td vaccine (2 - Td) 10/14/2027    Pneumococcal 0-64 years Vaccine  Completed    HIV screen  Completed

## 2019-08-08 DIAGNOSIS — E78.2 MIXED HYPERLIPIDEMIA: ICD-10-CM

## 2019-08-09 DIAGNOSIS — E78.2 MIXED HYPERLIPIDEMIA: ICD-10-CM

## 2019-08-09 RX ORDER — ATORVASTATIN CALCIUM 40 MG/1
40 TABLET, FILM COATED ORAL DAILY
Qty: 30 TABLET | Refills: 2 | Status: SHIPPED | OUTPATIENT
Start: 2019-08-09 | End: 2019-11-11 | Stop reason: SDUPTHER

## 2019-08-09 RX ORDER — ATORVASTATIN CALCIUM 40 MG/1
40 TABLET, FILM COATED ORAL DAILY
Qty: 30 TABLET | Refills: 0 | Status: SHIPPED | OUTPATIENT
Start: 2019-08-09 | End: 2020-03-13 | Stop reason: SDUPTHER

## 2019-10-04 DIAGNOSIS — J41.0 SIMPLE CHRONIC BRONCHITIS (HCC): ICD-10-CM

## 2019-10-07 RX ORDER — TIOTROPIUM BROMIDE INHALATION SPRAY 1.56 UG/1
SPRAY, METERED RESPIRATORY (INHALATION)
Qty: 4 G | Refills: 0 | Status: SHIPPED | OUTPATIENT
Start: 2019-10-07 | End: 2019-11-06 | Stop reason: SDUPTHER

## 2019-10-31 ENCOUNTER — OFFICE VISIT (OUTPATIENT)
Dept: OBGYN CLINIC | Age: 54
End: 2019-10-31
Payer: MEDICARE

## 2019-10-31 VITALS
HEIGHT: 65 IN | SYSTOLIC BLOOD PRESSURE: 118 MMHG | DIASTOLIC BLOOD PRESSURE: 70 MMHG | BODY MASS INDEX: 26.33 KG/M2 | WEIGHT: 158 LBS | HEART RATE: 81 BPM

## 2019-10-31 DIAGNOSIS — Z12.31 VISIT FOR SCREENING MAMMOGRAM: ICD-10-CM

## 2019-10-31 DIAGNOSIS — R93.89 ENDOMETRIAL THICKENING ON ULTRASOUND: ICD-10-CM

## 2019-10-31 DIAGNOSIS — N95.0 PMB (POSTMENOPAUSAL BLEEDING): Primary | ICD-10-CM

## 2019-10-31 DIAGNOSIS — K62.3 INCOMPLETE RECTAL PROLAPSE: ICD-10-CM

## 2019-10-31 PROCEDURE — G8419 CALC BMI OUT NRM PARAM NOF/U: HCPCS | Performed by: OBSTETRICS & GYNECOLOGY

## 2019-10-31 PROCEDURE — 99203 OFFICE O/P NEW LOW 30 MIN: CPT | Performed by: OBSTETRICS & GYNECOLOGY

## 2019-10-31 PROCEDURE — G8484 FLU IMMUNIZE NO ADMIN: HCPCS | Performed by: OBSTETRICS & GYNECOLOGY

## 2019-10-31 PROCEDURE — 3017F COLORECTAL CA SCREEN DOC REV: CPT | Performed by: OBSTETRICS & GYNECOLOGY

## 2019-10-31 PROCEDURE — 4004F PT TOBACCO SCREEN RCVD TLK: CPT | Performed by: OBSTETRICS & GYNECOLOGY

## 2019-10-31 PROCEDURE — G8427 DOCREV CUR MEDS BY ELIG CLIN: HCPCS | Performed by: OBSTETRICS & GYNECOLOGY

## 2019-11-06 DIAGNOSIS — J41.0 SIMPLE CHRONIC BRONCHITIS (HCC): ICD-10-CM

## 2019-11-07 RX ORDER — TIOTROPIUM BROMIDE INHALATION SPRAY 1.56 UG/1
SPRAY, METERED RESPIRATORY (INHALATION)
Qty: 4 G | Refills: 0 | Status: SHIPPED | OUTPATIENT
Start: 2019-11-07 | End: 2019-12-04 | Stop reason: SDUPTHER

## 2019-11-11 ENCOUNTER — OFFICE VISIT (OUTPATIENT)
Dept: FAMILY MEDICINE CLINIC | Age: 54
End: 2019-11-11
Payer: MEDICARE

## 2019-11-11 VITALS
BODY MASS INDEX: 26.33 KG/M2 | WEIGHT: 158 LBS | HEIGHT: 65 IN | HEART RATE: 78 BPM | OXYGEN SATURATION: 96 % | SYSTOLIC BLOOD PRESSURE: 137 MMHG | DIASTOLIC BLOOD PRESSURE: 80 MMHG

## 2019-11-11 DIAGNOSIS — D25.1 INTRAMURAL LEIOMYOMA OF UTERUS: ICD-10-CM

## 2019-11-11 DIAGNOSIS — Z01.818 PRE-OPERATIVE CLEARANCE: Primary | ICD-10-CM

## 2019-11-11 DIAGNOSIS — J41.0 SIMPLE CHRONIC BRONCHITIS (HCC): ICD-10-CM

## 2019-11-11 DIAGNOSIS — Z12.11 COLON CANCER SCREENING: ICD-10-CM

## 2019-11-11 DIAGNOSIS — K58.1 IRRITABLE BOWEL SYNDROME WITH CONSTIPATION: ICD-10-CM

## 2019-11-11 DIAGNOSIS — J02.9 ACUTE VIRAL PHARYNGITIS: ICD-10-CM

## 2019-11-11 PROCEDURE — 3023F SPIROM DOC REV: CPT | Performed by: FAMILY MEDICINE

## 2019-11-11 PROCEDURE — G8484 FLU IMMUNIZE NO ADMIN: HCPCS | Performed by: FAMILY MEDICINE

## 2019-11-11 PROCEDURE — G8926 SPIRO NO PERF OR DOC: HCPCS | Performed by: FAMILY MEDICINE

## 2019-11-11 PROCEDURE — G8427 DOCREV CUR MEDS BY ELIG CLIN: HCPCS | Performed by: FAMILY MEDICINE

## 2019-11-11 PROCEDURE — 3017F COLORECTAL CA SCREEN DOC REV: CPT | Performed by: FAMILY MEDICINE

## 2019-11-11 PROCEDURE — 4004F PT TOBACCO SCREEN RCVD TLK: CPT | Performed by: FAMILY MEDICINE

## 2019-11-11 PROCEDURE — 99214 OFFICE O/P EST MOD 30 MIN: CPT | Performed by: FAMILY MEDICINE

## 2019-11-11 PROCEDURE — G8419 CALC BMI OUT NRM PARAM NOF/U: HCPCS | Performed by: FAMILY MEDICINE

## 2019-11-11 RX ORDER — POLYETHYLENE GLYCOL 3350 17 G/17G
17 POWDER, FOR SOLUTION ORAL DAILY
Qty: 510 G | Refills: 5 | Status: SHIPPED | OUTPATIENT
Start: 2019-11-11 | End: 2019-12-11

## 2019-11-11 RX ORDER — GUAIFENESIN 600 MG/1
600 TABLET, EXTENDED RELEASE ORAL 2 TIMES DAILY
Qty: 60 TABLET | Refills: 0 | Status: SHIPPED | OUTPATIENT
Start: 2019-11-11 | End: 2021-10-07

## 2019-11-11 ASSESSMENT — ENCOUNTER SYMPTOMS
COUGH: 1
SHORTNESS OF BREATH: 0
WHEEZING: 0
SORE THROAT: 1
BACK PAIN: 0
CONSTIPATION: 0
RHINORRHEA: 0
ABDOMINAL DISTENTION: 0
CHEST TIGHTNESS: 0
BLOOD IN STOOL: 0
PHOTOPHOBIA: 0
VOMITING: 0
ABDOMINAL PAIN: 0
SINUS PRESSURE: 1

## 2019-11-11 ASSESSMENT — PATIENT HEALTH QUESTIONNAIRE - PHQ9
1. LITTLE INTEREST OR PLEASURE IN DOING THINGS: 0
SUM OF ALL RESPONSES TO PHQ QUESTIONS 1-9: 0
2. FEELING DOWN, DEPRESSED OR HOPELESS: 0
SUM OF ALL RESPONSES TO PHQ9 QUESTIONS 1 & 2: 0
SUM OF ALL RESPONSES TO PHQ QUESTIONS 1-9: 0

## 2019-12-04 DIAGNOSIS — E78.2 MIXED HYPERLIPIDEMIA: ICD-10-CM

## 2019-12-04 DIAGNOSIS — J41.0 SIMPLE CHRONIC BRONCHITIS (HCC): ICD-10-CM

## 2019-12-04 RX ORDER — TIOTROPIUM BROMIDE INHALATION SPRAY 1.56 UG/1
SPRAY, METERED RESPIRATORY (INHALATION)
Qty: 4 G | Refills: 0 | Status: SHIPPED | OUTPATIENT
Start: 2019-12-04 | End: 2020-01-02

## 2019-12-04 RX ORDER — ATORVASTATIN CALCIUM 40 MG/1
40 TABLET, FILM COATED ORAL DAILY
Qty: 30 TABLET | Refills: 0 | Status: SHIPPED | OUTPATIENT
Start: 2019-12-04 | End: 2020-01-02

## 2020-01-02 RX ORDER — TIOTROPIUM BROMIDE INHALATION SPRAY 1.56 UG/1
SPRAY, METERED RESPIRATORY (INHALATION)
Qty: 4 G | Refills: 0 | Status: SHIPPED | OUTPATIENT
Start: 2020-01-02 | End: 2020-02-04

## 2020-01-02 RX ORDER — ATORVASTATIN CALCIUM 40 MG/1
40 TABLET, FILM COATED ORAL DAILY
Qty: 30 TABLET | Refills: 0 | Status: SHIPPED | OUTPATIENT
Start: 2020-01-02 | End: 2020-02-04

## 2020-02-04 RX ORDER — ATORVASTATIN CALCIUM 40 MG/1
40 TABLET, FILM COATED ORAL DAILY
Qty: 30 TABLET | Refills: 0 | Status: SHIPPED | OUTPATIENT
Start: 2020-02-04 | End: 2020-03-06

## 2020-02-04 RX ORDER — TIOTROPIUM BROMIDE INHALATION SPRAY 1.56 UG/1
SPRAY, METERED RESPIRATORY (INHALATION)
Qty: 4 G | Refills: 0 | Status: SHIPPED | OUTPATIENT
Start: 2020-02-04 | End: 2020-03-06

## 2020-03-06 RX ORDER — TIOTROPIUM BROMIDE INHALATION SPRAY 1.56 UG/1
SPRAY, METERED RESPIRATORY (INHALATION)
Qty: 4 G | Refills: 0 | Status: SHIPPED | OUTPATIENT
Start: 2020-03-06 | End: 2020-04-07

## 2020-03-06 RX ORDER — ATORVASTATIN CALCIUM 40 MG/1
40 TABLET, FILM COATED ORAL DAILY
Qty: 30 TABLET | Refills: 0 | Status: SHIPPED | OUTPATIENT
Start: 2020-03-06 | End: 2020-04-07

## 2020-03-06 NOTE — TELEPHONE ENCOUNTER
Please Approve or Refuse.   Send to Pharmacy per Pt's Request:     RX: [id number]     Next Visit Date:  Visit date not found   Last Visit Date: 11/11/2019    Hemoglobin A1C (%)   Date Value   08/01/2019 5.6   01/24/2019 5.8   08/23/2018 5.8             ( goal A1C is < 7)   BP Readings from Last 3 Encounters:   11/11/19 137/80   10/31/19 118/70   08/01/19 132/84          (goal 120/80)  BUN   Date Value Ref Range Status   07/22/2019 7 6 - 20 mg/dL Final     CREATININE   Date Value Ref Range Status   07/22/2019 0.52 0.50 - 0.90 mg/dL Final     Potassium   Date Value Ref Range Status   07/22/2019 4.0 3.7 - 5.3 mmol/L Final

## 2020-03-09 ENCOUNTER — HOSPITAL ENCOUNTER (EMERGENCY)
Age: 55
Discharge: HOME | End: 2020-03-09
Attending: EMERGENCY MEDICINE
Payer: MEDICARE

## 2020-03-09 ENCOUNTER — APPOINTMENT (OUTPATIENT)
Dept: GENERAL RADIOLOGY | Age: 55
End: 2020-03-09
Payer: MEDICARE

## 2020-03-09 VITALS
WEIGHT: 155 LBS | BODY MASS INDEX: 25.83 KG/M2 | SYSTOLIC BLOOD PRESSURE: 150 MMHG | HEIGHT: 65 IN | HEART RATE: 84 BPM | DIASTOLIC BLOOD PRESSURE: 75 MMHG | RESPIRATION RATE: 18 BRPM | OXYGEN SATURATION: 96 % | TEMPERATURE: 97.7 F

## 2020-03-09 PROCEDURE — 96372 THER/PROPH/DIAG INJ SC/IM: CPT

## 2020-03-09 PROCEDURE — 99283 EMERGENCY DEPT VISIT LOW MDM: CPT

## 2020-03-09 PROCEDURE — 73502 X-RAY EXAM HIP UNI 2-3 VIEWS: CPT

## 2020-03-09 PROCEDURE — 6360000002 HC RX W HCPCS: Performed by: PHYSICIAN ASSISTANT

## 2020-03-09 RX ORDER — IBUPROFEN 600 MG/1
600 TABLET ORAL EVERY 6 HOURS PRN
Qty: 30 TABLET | Refills: 0 | Status: SHIPPED | OUTPATIENT
Start: 2020-03-09 | End: 2020-03-13

## 2020-03-09 RX ORDER — CYCLOBENZAPRINE HCL 10 MG
10 TABLET ORAL 2 TIMES DAILY PRN
Qty: 10 TABLET | Refills: 0 | Status: SHIPPED | OUTPATIENT
Start: 2020-03-09 | End: 2020-03-13 | Stop reason: SDUPTHER

## 2020-03-09 RX ORDER — KETOROLAC TROMETHAMINE 30 MG/ML
30 INJECTION, SOLUTION INTRAMUSCULAR; INTRAVENOUS ONCE
Status: COMPLETED | OUTPATIENT
Start: 2020-03-09 | End: 2020-03-09

## 2020-03-09 RX ORDER — ORPHENADRINE CITRATE 30 MG/ML
30 INJECTION INTRAMUSCULAR; INTRAVENOUS ONCE
Status: COMPLETED | OUTPATIENT
Start: 2020-03-09 | End: 2020-03-09

## 2020-03-09 RX ADMIN — ORPHENADRINE CITRATE 30 MG: 30 INJECTION INTRAMUSCULAR; INTRAVENOUS at 11:11

## 2020-03-09 RX ADMIN — KETOROLAC TROMETHAMINE 30 MG: 30 INJECTION, SOLUTION INTRAMUSCULAR; INTRAVENOUS at 11:15

## 2020-03-09 ASSESSMENT — PAIN DESCRIPTION - ORIENTATION: ORIENTATION: LEFT

## 2020-03-09 ASSESSMENT — PAIN DESCRIPTION - LOCATION: LOCATION: LEG

## 2020-03-09 ASSESSMENT — PAIN SCALES - GENERAL
PAINLEVEL_OUTOF10: 6
PAINLEVEL_OUTOF10: 6

## 2020-03-09 ASSESSMENT — PAIN DESCRIPTION - PAIN TYPE: TYPE: ACUTE PAIN

## 2020-03-09 ASSESSMENT — PAIN DESCRIPTION - DESCRIPTORS: DESCRIPTORS: BURNING

## 2020-03-09 ASSESSMENT — ENCOUNTER SYMPTOMS: BACK PAIN: 0

## 2020-03-09 ASSESSMENT — PAIN DESCRIPTION - FREQUENCY: FREQUENCY: CONTINUOUS

## 2020-03-09 NOTE — ED PROVIDER NOTES
[address]  eMERGENCY dEPARTMENT eNCOUnter      Pt Name: Loi Duval  MRN: [id number]  [place] 1965  Date of evaluation: 3/9/20      CHIEF COMPLAINT       Chief Complaint   Patient presents with    Leg Pain         HISTORY OF PRESENT ILLNESS    Loi Duval is a 50 y.o. female who presents complaining of left hip and left upper leg pain. No injury or trauma. No loss of bowel or bladder control. Pain is worse with flexion extension abduction. The history is provided by the patient. Leg Injury   Location:  Hip  Time since incident:  3 days  Injury: no    Hip location:  L hip  Pain details:     Quality:  Aching    Radiates to:  L leg    Severity:  Moderate    Onset quality:  Gradual    Duration:  3 days    Timing:  Intermittent    Progression:  Waxing and waning  Chronicity:  New  Dislocation: no    Foreign body present:  No foreign bodies  Tetanus status:  Unknown  Prior injury to area:  No  Relieved by:  Rest  Worsened by:  Abduction  Ineffective treatments:  None tried  Associated symptoms: no back pain, no decreased ROM, no numbness, no stiffness, no swelling and no tingling        REVIEW OF SYSTEMS       Review of Systems   Musculoskeletal: Negative for back pain and stiffness. Left hip and leg pain no injury or trauma but she states her dog does sleep up against her leg quite frequently. Denies any loss of bowel or bladder control pain is left hip radiates to left thigh area. All other systems reviewed and are negative.       PAST MEDICAL HISTORY     Past Medical History:   Diagnosis Date    Chronic kidney disease     CKD (chronic kidney disease) stage 1, GFR 90 ml/min or greater     COPD (chronic obstructive pulmonary disease) (HCC)     Depression     Hyperlipidemia     Hypocalcemia     IBS (irritable bowel syndrome)     Proteinuria        SURGICAL HISTORY       Past Surgical History:   Procedure Laterality Date     SECTION      CS x 2    COLONOSCOPY      TUBAL LIGATION Bilateral        CURRENT MEDICATIONS       Previous Medications    ALBUTEROL SULFATE HFA (VENTOLIN HFA) 108 (90 BASE) MCG/ACT INHALER    Inhale 2 puffs into the lungs every 6 hours as needed for Wheezing    ASPIRIN (ASPIRIN LOW DOSE) 81 MG EC TABLET    Take 1 tablet by mouth daily    ATORVASTATIN (LIPITOR) 40 MG TABLET    TAKE 1 TABLET BY MOUTH DAILY    ATORVASTATIN (LIPITOR) 40 MG TABLET    TAKE 1 TABLET BY MOUTH DAILY    BLOOD PRESSURE MONITOR KIT    TAKE BLOOD PRESSURE DAILY    ELASTIC BANDAGES & SUPPORTS (KNEE BRACE/HINGED/LARGE) MISC    Use daily for knee pain    FLUTICASONE (FLONASE) 50 MCG/ACT NASAL SPRAY    1 spray by Nasal route 2 times daily for 14 days    GUAIFENESIN (MUCINEX) 600 MG EXTENDED RELEASE TABLET    Take 1 tablet by mouth 2 times daily    HYDROCORTISONE 2.5 % CREAM    APPLY TO AFFECTED AREA BID    IBUPROFEN (ADVIL;MOTRIN) 800 MG TABLET    Take 1 tablet by mouth every 8 hours as needed for Pain    LIDOCAINE (LMX) 4 % CREAM    Apply topically every 8 hrs as needed for pain    LORATADINE (CLARITIN) 10 MG TABLET    Take 1 tablet by mouth daily    NICOTINE (NICODERM CQ) 14 MG/24HR    Place 1 patch onto the skin every 24 hours    NICOTINE POLACRILEX (NICORETTE) 2 MG GUM    Take 1 each by mouth as needed for Smoking cessation    SPIRIVA RESPIMAT 1.25 MCG/ACT AERS INHALER    INHALE 2 PUFFS INTO THE LUNGS DAILY       ALLERGIES     has No Known Allergies. FAMILY HISTORY     She indicated that her mother is . She indicated that her father is . SOCIAL HISTORY      reports that she has been smoking cigarettes. She has a 28.00 pack-year smoking history. She has never used smokeless tobacco. She reports that she does not drink alcohol or use drugs.     PHYSICAL EXAM     INITIAL VITALS: BP (!) 150/75   Pulse 84   Temp 97.7 °F (36.5 °C)   Resp 18   Ht 5' 5\" (1.651 m)   Wt 155 lb (70.3 kg)   LMP 2016   SpO2 96%   BMI 25.79 kg/m²      Physical Exam  Vitals signs and nursing note reviewed. Constitutional:       Appearance: She is well-developed. HENT:      Head: Normocephalic and atraumatic. Cardiovascular:      Rate and Rhythm: Normal rate and regular rhythm. Heart sounds: Normal heart sounds. Pulmonary:      Effort: Pulmonary effort is normal.      Breath sounds: Normal breath sounds. Musculoskeletal:      Left hip: She exhibits tenderness. She exhibits normal range of motion, normal strength, no bony tenderness, no swelling, no crepitus and no deformity. Legs:       Comments: Left hip pain. No shortening or rotation peripheral pulses palpable no redness swelling bruising pain worse with straight leg raise at approximately 35 to 40 degrees. Neurological:      Mental Status: She is alert and oriented to person, place, and time. MEDICAL DECISION MAKING:     X-ray left hip pelvis shows no acute process. Signs symptoms are consistent with muscle strain or even iliotibial syndrome. Recommend ice heat to affected area Tylenol Motrin for pain muscle relaxer if needed. Gentle stretching increase fluids and follow-up with primary care provider in 1 to 2 days for recheck return if worse or other concerns. DIAGNOSTIC RESULTS     EKG: All EKG's are interpreted by the Emergency Department Physician who either signs or Co-signs this chart in the absence of acardiologist.        RADIOLOGY:Allplain film, CT, MRI, and formal ultrasound images (except ED bedside ultrasound) are read by the radiologist and the images and interpretations are directly viewed by the emergency physician. LABS:All lab results were reviewed by myself, and all abnormals are listed below.   Labs Reviewed - No data to display      EMERGENCY DEPARTMENT COURSE:   Vitals:    Vitals:    03/09/20 1006   BP: (!) 150/75   Pulse: 84   Resp: 18   Temp: 97.7 °F (36.5 °C)   SpO2: 96%   Weight: 155 lb (70.3 kg)   Height: 5' 5\" (1.651 m)       The patient was given the following medications while in the emergency department:  Orders Placed This Encounter   Medications    ketorolac (TORADOL) injection 30 mg    orphenadrine (NORFLEX) injection 30 mg    ibuprofen (IBU) 600 MG tablet     Sig: Take 1 tablet by mouth every 6 hours as needed for Pain     Dispense:  30 tablet     Refill:  0    cyclobenzaprine (FLEXERIL) 10 MG tablet     Sig: Take 1 tablet by mouth 2 times daily as needed for Muscle spasms     Dispense:  10 tablet     Refill:  0       -------------------------      CRITICAL CARE:    CONSULTS:  None    PROCEDURES:  Procedures    FINAL IMPRESSION      1.  Left leg pain          DISPOSITION/PLAN   DISPOSITION Decision To Discharge 03/09/2020 11:48:32 AM      PATIENT REFERREDTO:  Faisal Amaya MD  [address] [facility] [address]  [address] [facility] [phone]    Schedule an appointment as soon as possible for a visit in 2 days      [facility] ED  [facility] [address]  [phone]    If symptoms worsen      DISCHARGEMEDICATIONS:  New Prescriptions    CYCLOBENZAPRINE (FLEXERIL) 10 MG TABLET    Take 1 tablet by mouth 2 times daily as needed for Muscle spasms    IBUPROFEN (IBU) 600 MG TABLET    Take 1 tablet by mouth every 6 hours as needed for Pain       (Please note that portions of this note were completed with a voice recognition program.  Efforts were made to edit thedictations but occasionally words are mis-transcribed.)    Nina Obregon PA-C                ;      Nina Obregon PA-C  03/09/20 [id number]

## 2020-03-09 NOTE — ED NOTES
Pt denies further needs from writer at this time; light turned off for comfort; warm blankets provided.      Az Julio RN  03/09/20 1397

## 2020-03-09 NOTE — ED NOTES
Mode of arrival (squad #, walk in, police, etc) : walk in        Chief complaint(s): left hip pain        Arrival Note (brief scenario, treatment PTA, etc). : Pt denies injury- onset 2 days PTA. PMS intact. Pt ambulates. C= \"Have you ever felt that you should Cut down on your drinking? \"  No  A= \"Have people Annoyed you by criticizing your drinking? \"  No  G= \"Have you ever felt bad or Guilty about your drinking? \"  No  E= \"Have you ever had a drink as an Eye-opener first thing in the morning to steady your nerves or to help a hangover? \"  No      Deferred []      Reason for deferring: N/A    *If yes to two or more: probable alcohol abuse. Darron Casillas RN  03/09/20 1037

## 2020-03-10 ENCOUNTER — TELEPHONE (OUTPATIENT)
Dept: FAMILY MEDICINE CLINIC | Age: 55
End: 2020-03-10

## 2020-03-10 NOTE — TELEPHONE ENCOUNTER
[facility] ([facility]) ED Follow up Call    Reason for ED visit:  LEFT LEG PAIN     3/10/2020           FU appts/Provider:    No future appointments. VOICEMAIL DOCUMENTATION - ERASE IF NOT USED  Hi, this message is for Misha Delgado. This is Claudene Mortimer from [facility] office. Just calling to see how you are doing after your recent visit to the Emergency Room. [facility] wants to make sure you were able to fill any prescriptions and that you understand your discharge instructions. Please return our call if you need to make a follow up appointment with your provider or have any further needs. Our phone number is [phone]. Have a great day.

## 2020-03-13 ENCOUNTER — HOSPITAL ENCOUNTER (OUTPATIENT)
Age: 55
Discharge: HOME | End: 2020-03-15
Payer: MEDICARE

## 2020-03-13 ENCOUNTER — HOSPITAL ENCOUNTER (OUTPATIENT)
Dept: GENERAL RADIOLOGY | Age: 55
Discharge: HOME | End: 2020-03-15
Payer: MEDICARE

## 2020-03-13 ENCOUNTER — OFFICE VISIT (OUTPATIENT)
Dept: FAMILY MEDICINE CLINIC | Age: 55
End: 2020-03-13
Payer: MEDICARE

## 2020-03-13 VITALS
SYSTOLIC BLOOD PRESSURE: 118 MMHG | HEART RATE: 88 BPM | BODY MASS INDEX: 26.02 KG/M2 | DIASTOLIC BLOOD PRESSURE: 78 MMHG | WEIGHT: 156.2 LBS | OXYGEN SATURATION: 96 % | HEIGHT: 65 IN

## 2020-03-13 PROBLEM — E27.9 ADRENAL NODULE (HCC): Status: ACTIVE | Noted: 2020-03-13

## 2020-03-13 PROBLEM — M25.362 INSTABILITY OF LEFT KNEE JOINT: Status: ACTIVE | Noted: 2020-03-13

## 2020-03-13 PROBLEM — M79.605 PAIN OF LEFT LOWER EXTREMITY: Status: ACTIVE | Noted: 2020-03-13

## 2020-03-13 PROBLEM — E27.8 ADRENAL NODULE (HCC): Status: ACTIVE | Noted: 2020-03-13

## 2020-03-13 PROCEDURE — 99213 OFFICE O/P EST LOW 20 MIN: CPT | Performed by: FAMILY MEDICINE

## 2020-03-13 PROCEDURE — G8419 CALC BMI OUT NRM PARAM NOF/U: HCPCS | Performed by: FAMILY MEDICINE

## 2020-03-13 PROCEDURE — G8427 DOCREV CUR MEDS BY ELIG CLIN: HCPCS | Performed by: FAMILY MEDICINE

## 2020-03-13 PROCEDURE — 4004F PT TOBACCO SCREEN RCVD TLK: CPT | Performed by: FAMILY MEDICINE

## 2020-03-13 PROCEDURE — G8926 SPIRO NO PERF OR DOC: HCPCS | Performed by: FAMILY MEDICINE

## 2020-03-13 PROCEDURE — 72100 X-RAY EXAM L-S SPINE 2/3 VWS: CPT

## 2020-03-13 PROCEDURE — 3017F COLORECTAL CA SCREEN DOC REV: CPT | Performed by: FAMILY MEDICINE

## 2020-03-13 PROCEDURE — G8484 FLU IMMUNIZE NO ADMIN: HCPCS | Performed by: FAMILY MEDICINE

## 2020-03-13 PROCEDURE — 3023F SPIROM DOC REV: CPT | Performed by: FAMILY MEDICINE

## 2020-03-13 RX ORDER — IBUPROFEN 800 MG/1
800 TABLET ORAL EVERY 6 HOURS PRN
Qty: 90 TABLET | Refills: 1 | Status: SHIPPED | OUTPATIENT
Start: 2020-03-13 | End: 2020-04-22

## 2020-03-13 RX ORDER — CYCLOBENZAPRINE HCL 10 MG
10 TABLET ORAL 2 TIMES DAILY PRN
Qty: 60 TABLET | Refills: 0 | Status: SHIPPED | OUTPATIENT
Start: 2020-03-13 | End: 2020-06-03

## 2020-03-13 ASSESSMENT — ENCOUNTER SYMPTOMS
COLOR CHANGE: 0
PHOTOPHOBIA: 0
ABDOMINAL DISTENTION: 0
COUGH: 0
CHEST TIGHTNESS: 0
CONSTIPATION: 0
SHORTNESS OF BREATH: 0
WHEEZING: 0
BACK PAIN: 0

## 2020-03-13 ASSESSMENT — PATIENT HEALTH QUESTIONNAIRE - PHQ9
SUM OF ALL RESPONSES TO PHQ QUESTIONS 1-9: 0
SUM OF ALL RESPONSES TO PHQ QUESTIONS 1-9: 0
2. FEELING DOWN, DEPRESSED OR HOPELESS: 0
SUM OF ALL RESPONSES TO PHQ9 QUESTIONS 1 & 2: 0
1. LITTLE INTEREST OR PLEASURE IN DOING THINGS: 0

## 2020-03-13 NOTE — PROGRESS NOTES
Visit Information    Have you changed or started any medications since your last visit including any over-the-counter medicines, vitamins, or herbal medicines? no   Are you having any side effects from any of your medications? -  no  Have you stopped taking any of your medications? Is so, why? -  no    Have you seen any other physician or provider since your last visit? No  Have you had any other diagnostic tests since your last visit? No  Have you been seen in the emergency room and/or had an admission to a hospital since we last saw you? Yes - Records Obtained  Have you had your routine dental cleaning in the past 6 months? no    Have you activated your [employer] account? If not, what are your barriers?  Yes     Patient Care Team:  Brandon Fuentes MD as PCP - General (Family Medicine)  Brandon Fuentes MD as PCP - [facility]  Patti Gamble MD as Surgeon (Colon and Rectal Surgery)    Medical History Review  Past Medical, Family, and Social History reviewed and does contribute to the patient presenting condition    Health Maintenance   Topic Date Due    Colon Cancer Screen FIT/FOBT  08/30/2019    Lipid screen  01/24/2020    Flu vaccine (1) 11/11/2020 (Originally 9/1/2019)    Shingles Vaccine (1 of 2) 11/11/2020 (Originally 2/14/2017)    A1C test (Diabetic or Prediabetic)  08/01/2020    Breast cancer screen  09/12/2020    Cervical cancer screen  08/23/2021    DTaP/Tdap/Td vaccine (2 - Td) 10/14/2027    Pneumococcal 0-64 years Vaccine  Completed    HIV screen  Completed    Hepatitis A vaccine  Aged Out    Hepatitis B vaccine  Aged Out    Hib vaccine  Aged Out    Meningococcal (ACWY) vaccine  Aged Out

## 2020-03-13 NOTE — PROGRESS NOTES
07/22/2019    HCT 47.4 (H) 07/22/2019    MCV 87.7 07/22/2019     07/22/2019       @BRIEFLAB(NA,K,CL,CO2,BUN,CREATININE,GLUCOSE,CALCIUM)@     Lab Results   Component Value Date    ALT 16 07/22/2019    AST 19 07/22/2019    ALKPHOS 103 07/22/2019    BILITOT 0.32 07/22/2019       Lab Results   Component Value Date    TSH 0.64 08/08/2018       Lab Results   Component Value Date    CHOL 210 (H) 01/24/2019    CHOL 303 (H) 08/08/2018    CHOL 400 (H) 06/23/2016     Lab Results   Component Value Date    TRIG 99 01/24/2019    TRIG 162 (H) 08/08/2018    TRIG 252 (H) 06/23/2016     Lab Results   Component Value Date    HDL 39 (L) 01/24/2019    HDL 47 08/08/2018    HDL 50 06/23/2016     Lab Results   Component Value Date    LDLCHOLESTEROL 151 (H) 01/24/2019    LDLCHOLESTEROL 224 (H) 08/08/2018    LDLCHOLESTEROL 300 (H) 06/23/2016     Lab Results   Component Value Date    VLDL NOT REPORTED 01/24/2019    VLDL NOT REPORTED 08/08/2018    VLDL NOT REPORTED 06/23/2016     Lab Results   Component Value Date    CHOLHDLRATIO 5.4 (H) 01/24/2019    CHOLHDLRATIO 6.4 (H) 08/08/2018    CHOLHDLRATIO 8.0 (H) 06/23/2016       Lab Results   Component Value Date    LABA1C 5.6 08/01/2019       No results found for: [id number]    No results found for: FOLATE    No results found for: IRON, TIBC, FERRITIN    No results found for: VITD25          Current Outpatient Medications   Medication Sig Dispense Refill    cyclobenzaprine (FLEXERIL) 10 MG tablet Take 1 tablet by mouth 2 times daily as needed for Muscle spasms 60 tablet 0    ibuprofen (ADVIL;MOTRIN) 800 MG tablet Take 1 tablet by mouth every 6 hours as needed for Pain 90 tablet 1    atorvastatin (LIPITOR) 40 MG tablet TAKE 1 TABLET BY MOUTH DAILY 30 tablet 0    SPIRIVA RESPIMAT 1.25 MCG/ACT AERS inhaler INHALE 2 PUFFS INTO THE LUNGS DAILY 4 g 0    guaiFENesin (MUCINEX) 600 MG extended release tablet Take 1 tablet by mouth 2 times daily 60 tablet 0    aspirin (ASPIRIN LOW DOSE) 81 MG EC tablet Take 1 tablet by mouth daily 30 tablet 3    hydrocortisone 2.5 % cream APPLY TO AFFECTED AREA BID  3    albuterol sulfate HFA (VENTOLIN HFA) 108 (90 Base) MCG/ACT inhaler Inhale 2 puffs into the lungs every 6 hours as needed for Wheezing 1 Inhaler 3    nicotine (NICODERM CQ) 14 MG/24HR Place 1 patch onto the skin every 24 hours 30 patch 3    nicotine polacrilex (NICORETTE) 2 MG gum Take 1 each by mouth as needed for Smoking cessation 110 each 3    lidocaine (LMX) 4 % cream Apply topically every 8 hrs as needed for pain 45 g 3    Elastic Bandages & Supports (KNEE BRACE/HINGED/LARGE) MISC Use daily for knee pain 1 each 0    loratadine (CLARITIN) 10 MG tablet Take 1 tablet by mouth daily 30 tablet 0    Blood Pressure Monitor KIT TAKE BLOOD PRESSURE DAILY 1 kit 0    fluticasone (FLONASE) 50 MCG/ACT nasal spray 1 spray by Nasal route 2 times daily for 14 days 1 Bottle 2    ibuprofen (ADVIL;MOTRIN) 800 MG tablet Take 1 tablet by mouth every 8 hours as needed for Pain 30 tablet 0     No current facility-administered medications for this visit.               Social History     Socioeconomic History    Marital status: Single     Spouse name: Not on file    Number of children: Not on file    Years of education: Not on file    Highest education level: Not on file   Occupational History    Not on file   Social Needs    Financial resource strain: Not on file    Food insecurity     Worry: Not on file     Inability: Not on file    Transportation needs     Medical: Not on file     Non-medical: Not on file   Tobacco Use    Smoking status: Current Some Day Smoker     Packs/day: 1.00     Years: 28.00     Pack years: 28.00     Types: Cigarettes    Smokeless tobacco: Never Used    Tobacco comment: down to 5 cigs per day   Substance and Sexual Activity    Alcohol use: No     Alcohol/week: 0.0 standard drinks    Drug use: No    Sexual activity: Not on file   Lifestyle    Physical activity     Days per week: Not on file     Minutes per session: Not on file    Stress: Not on file   Relationships    Social connections     Talks on phone: Not on file     Gets together: Not on file     Attends [employer] service: Not on file     Active member of club or organization: Not on file     Attends meetings of clubs or organizations: Not on file     Relationship status: Not on file    Intimate partner violence     Fear of current or ex partner: Not on file     Emotionally abused: Not on file     Physically abused: Not on file     Forced sexual activity: Not on file   Other Topics Concern    Not on file   Social History Narrative    Not on file     Ready to quit: Yes  Counseling given: Yes  Comment: down to 5 cigs per day        Family History   Problem Relation Age of Onset    Asthma Father     Cancer Father         colon    Diabetes Father     COPD Father     Heart Failure Father     Seizures Mother              -rest of complaints with corresponding details per ROS    The patient's past medical, surgical, social, and family history as well as her current medications and allergies were reviewed as documented intoday's encounter. Review of Systems   Constitutional: Positive for activity change. Negative for appetite change, fatigue and unexpected weight change. HENT: Negative. Eyes: Negative for photophobia and visual disturbance. Respiratory: Negative for cough, chest tightness, shortness of breath and wheezing. Cardiovascular: Negative for chest pain, palpitations and leg swelling. Gastrointestinal: Negative for abdominal distention and constipation. Endocrine: Negative for polyphagia and polyuria. Genitourinary: Negative for difficulty urinating. Musculoskeletal: Positive for arthralgias, gait problem and joint swelling. Negative for back pain. Leg pain   Skin: Negative for color change. Neurological: Positive for weakness and numbness. Negative for light-headedness. Psychiatric/Behavioral: Negative for agitation, confusion and decreased concentration. Physical Exam  Vitals signs and nursing note reviewed. Constitutional:       Appearance: Normal appearance. HENT:      Head: Normocephalic and atraumatic. Neck:      Musculoskeletal: Normal range of motion. Cardiovascular:      Rate and Rhythm: Normal rate and regular rhythm. Heart sounds: Normal heart sounds. Pulmonary:      Effort: Pulmonary effort is normal.      Breath sounds: Normal breath sounds. Musculoskeletal:      Left hip: She exhibits decreased range of motion. She exhibits normal strength, no tenderness, no bony tenderness and no swelling. Left knee: She exhibits decreased range of motion. She exhibits no swelling, normal alignment and normal patellar mobility. Tenderness found. Medial joint line, lateral joint line and LCL tenderness noted. Lumbar back: She exhibits normal range of motion, no tenderness, no bony tenderness, no deformity, no pain and no spasm. Neurological:      Mental Status: She is alert and oriented to person, place, and time. Cranial Nerves: Cranial nerves are intact. Sensory: Sensation is intact. Motor: Motor function is intact. ASSESSMENT AND PLAN      1. Pain of left lower extremity  We will do x-ray back also, continue Flexeril and NSAIDs as needed-, referral to sports medicine continue using knee brace  - XR LUMBAR SPINE (2-3 VIEWS); Future  - [address] [place], [place] [address] [facility]  - cyclobenzaprine (FLEXERIL) 10 MG tablet; Take 1 tablet by mouth 2 times daily as needed for Muscle spasms  Dispense: 60 tablet; Refill: 0  - ibuprofen (ADVIL;MOTRIN) 800 MG tablet; Take 1 tablet by mouth every 6 hours as needed for Pain  Dispense: 90 tablet; Refill: 1    2. Instability of left knee joint  -Discussed with patient you might need MRI.   Referral to sports medicine  - [address] [place], [place] [email or web address] [facility]  - cyclobenzaprine (FLEXERIL) 10 MG tablet; Take 1 tablet by mouth 2 times daily as needed for Muscle spasms  Dispense: 60 tablet; Refill: 0  - ibuprofen (ADVIL;MOTRIN) 800 MG tablet; Take 1 tablet by mouth every 6 hours as needed for Pain  Dispense: 90 tablet; Refill: 1    3. Simple chronic bronchitis (HCC)  Stable continue same medications    4. Adrenal nodule ([facility] [address]  Needs repeat CT in July      Orders Placed This Encounter   Procedures    XR LUMBAR SPINE (2-3 VIEWS)     Standing Status:   Future     Number of Occurrences:   1     Standing Expiration Date:   3/13/2021     Order Specific Question:   Reason for exam:     Answer:   low back pain   [id number] [facility]     Referral Priority:   Routine     Referral Type:   Eval and Treat     Referral Reason:   Specialty Services Required     Requested Specialty:   Sports Medicine     Number of Visits Requested:   1         Medications Discontinued During This Encounter   Medication Reason    atorvastatin (LIPITOR) 40 MG tablet DUPLICATE    cyclobenzaprine (FLEXERIL) 10 MG tablet REORDER    ibuprofen (IBU) 600 MG tablet        Ludwig Arora received counseling on the following healthy behaviors: nutrition, exercise and medication adherence  Reviewed prior labs and health maintenance. Continue current medications, diet and exercise. Discussed use, benefit, and side effects of prescribed medications. Barriers to medication compliance addressed. Patient given educational materials - see patient instructions. All patient questions answered. Patient voiced understanding. The patient'spast medical, surgical, social, and family history as well as her   current medications and allergies were reviewed as documented in today's encounter. Medications, labs, diagnostic studies, consultations andfollow-up as documented in this encounter. No follow-ups on file. Patient wasseen with total face to face time of 15 minutes.  More than 50% of

## 2020-04-22 RX ORDER — IBUPROFEN 800 MG/1
800 TABLET ORAL EVERY 6 HOURS PRN
Qty: 90 TABLET | Refills: 1 | Status: SHIPPED | OUTPATIENT
Start: 2020-04-22 | End: 2021-10-07

## 2020-06-03 RX ORDER — CYCLOBENZAPRINE HCL 10 MG
TABLET ORAL
Qty: 60 TABLET | Refills: 0 | Status: SHIPPED | OUTPATIENT
Start: 2020-06-03 | End: 2020-07-27

## 2020-06-03 NOTE — TELEPHONE ENCOUNTER
Last seen 3/13/20    Next Visit Date:  No future appointments.     Health Maintenance   Topic Date Due    Colon Cancer Screen FIT/FOBT  08/30/2019    Lipid screen  01/24/2020    Flu vaccine (Season Ended) 11/11/2020 (Originally 9/1/2020)    Shingles Vaccine (1 of 2) 11/11/2020 (Originally 2/14/2017)    A1C test (Diabetic or Prediabetic)  08/01/2020    Breast cancer screen  09/12/2020    Cervical cancer screen  08/23/2021    DTaP/Tdap/Td vaccine (2 - Td) 10/14/2027    Pneumococcal 0-64 years Vaccine  Completed    HIV screen  Completed    Hepatitis A vaccine  Aged Out    Hepatitis B vaccine  Aged Out    Hib vaccine  Aged Out    Meningococcal (ACWY) vaccine  Aged Out       Hemoglobin A1C (%)   Date Value   08/01/2019 5.6   01/24/2019 5.8   08/23/2018 5.8             ( goal A1C is < 7)   No results found for: LABMICR  LDL Cholesterol (mg/dL)   Date Value   01/24/2019 151 (H)       (goal LDL is <100)   AST (U/L)   Date Value   07/22/2019 19     ALT (U/L)   Date Value   07/22/2019 16     BUN (mg/dL)   Date Value   07/22/2019 7     BP Readings from Last 3 Encounters:   03/13/20 118/78   03/09/20 (!) 150/75   11/11/19 137/80          (goal 120/80)    All Future Testing planned in [facility]  Lab Frequency Next Occurrence   PAP Smear Once 07/20/2020   Lipid Panel Once 08/01/2020   MORE DIGITAL SCREEN W CAD BILATERAL Once 10/51/1426   Follicle Stimulating Hormone Once 06/10/2020   Estradiol Once 06/10/2020   EKG 12 Lead Once 11/11/2020   Urinalysis With Microscopic Once 11/11/2020   Cologuard (For External Results Only) Once 11/11/2020               Patient Active Problem List:     IBS (irritable bowel syndrome)     Depression     Proteinuria     CKD (chronic kidney disease) stage 1, GFR 90 ml/min or greater     Mixed hyperlipidemia     Thyroid nodule     Chronic obstructive pulmonary disease ([facility] [address]     Family history of colon cancer     Personal history of smoking     Calcification of right breast     Breast

## 2020-06-09 RX ORDER — ASPIRIN 81 MG/1
TABLET, COATED ORAL
Qty: 30 TABLET | Refills: 3 | Status: SHIPPED | OUTPATIENT
Start: 2020-06-09 | End: 2020-10-26 | Stop reason: SDUPTHER

## 2020-06-09 NOTE — TELEPHONE ENCOUNTER
Please Approve or Refuse.   Send to Pharmacy per Pt's Request:      Next Visit Date:  Visit date not found   Last Visit Date: 3/13/2020    Hemoglobin A1C (%)   Date Value   08/01/2019 5.6   01/24/2019 5.8   08/23/2018 5.8             ( goal A1C is < 7)   BP Readings from Last 3 Encounters:   03/13/20 118/78   03/09/20 (!) 150/75   11/11/19 137/80          (goal 120/80)  BUN   Date Value Ref Range Status   07/22/2019 7 6 - 20 mg/dL Final     CREATININE   Date Value Ref Range Status   07/22/2019 0.52 0.50 - 0.90 mg/dL Final     Potassium   Date Value Ref Range Status   07/22/2019 4.0 3.7 - 5.3 mmol/L Final

## 2020-07-22 ENCOUNTER — TELEPHONE (OUTPATIENT)
Dept: OBGYN CLINIC | Age: 55
End: 2020-07-22

## 2020-07-22 NOTE — TELEPHONE ENCOUNTER
Left several messages for patient to call the office regarding her clearance for her D&C [facility]. Patient need to have a EKG and blood work according to the PCP note.    I will send patient a letter also

## 2020-07-27 RX ORDER — TIOTROPIUM BROMIDE INHALATION SPRAY 1.56 UG/1
SPRAY, METERED RESPIRATORY (INHALATION)
Qty: 4 G | Refills: 2 | Status: SHIPPED | OUTPATIENT
Start: 2020-07-27 | End: 2021-10-07

## 2020-07-27 RX ORDER — CYCLOBENZAPRINE HCL 10 MG
TABLET ORAL
Qty: 60 TABLET | Refills: 0 | Status: SHIPPED | OUTPATIENT
Start: 2020-07-27 | End: 2021-10-07

## 2020-07-27 NOTE — TELEPHONE ENCOUNTER
Last seen 3/13/20    Next Visit Date:  No future appointments.     Health Maintenance   Topic Date Due    Colon Cancer Screen FIT/FOBT  08/30/2019    Lipid screen  01/24/2020    A1C test (Diabetic or Prediabetic)  08/01/2020    Shingles Vaccine (1 of 2) 11/11/2020 (Originally 2/14/2017)    Flu vaccine (1) 09/01/2020    Breast cancer screen  09/12/2020    Cervical cancer screen  08/23/2021    DTaP/Tdap/Td vaccine (2 - Td) 10/14/2027    Pneumococcal 0-64 years Vaccine  Completed    HIV screen  Completed    Hepatitis A vaccine  Aged Out    Hepatitis B vaccine  Aged Out    Hib vaccine  Aged Out    Meningococcal (ACWY) vaccine  Aged Out       Hemoglobin A1C (%)   Date Value   08/01/2019 5.6   01/24/2019 5.8   08/23/2018 5.8             ( goal A1C is < 7)   No results found for: LABMICR  LDL Cholesterol (mg/dL)   Date Value   01/24/2019 151 (H)       (goal LDL is <100)   AST (U/L)   Date Value   07/22/2019 19     ALT (U/L)   Date Value   07/22/2019 16     BUN (mg/dL)   Date Value   07/22/2019 7     BP Readings from Last 3 Encounters:   03/13/20 118/78   03/09/20 (!) 150/75   11/11/19 137/80          (goal 120/80)    All Future Testing planned in [facility]  Lab Frequency Next Occurrence   Lipid Panel Once 08/01/2020   MORE DIGITAL SCREEN W CAD BILATERAL Once 80/75/6033   Follicle Stimulating Hormone Once 08/13/2020   Estradiol Once 08/13/2020   EKG 12 Lead Once 11/11/2020   Urinalysis With Microscopic Once 11/11/2020   Cologuard (For External Results Only) Once 11/11/2020               Patient Active Problem List:     IBS (irritable bowel syndrome)     Depression     Proteinuria     CKD (chronic kidney disease) stage 1, GFR 90 ml/min or greater     Mixed hyperlipidemia     Thyroid nodule     Chronic obstructive pulmonary disease (HCC)     Family history of colon cancer     Personal history of smoking     Calcification of right breast     Breast calcification, left     Incomplete rectal prolapse-Congenital defect.  Surgery at 5 yo     Prediabetes     Acute pain of left knee     Intramural leiomyoma of uterus     PMB (postmenopausal bleeding)     Adrenal adenoma     Endometrial thickening on ultrasound     Adrenal nodule (HCC)     Pain of left lower extremity     Instability of left knee joint

## 2020-09-21 RX ORDER — ALBUTEROL SULFATE 90 UG/1
2 AEROSOL, METERED RESPIRATORY (INHALATION) EVERY 6 HOURS PRN
Qty: 1 INHALER | Refills: 3 | Status: SHIPPED | OUTPATIENT
Start: 2020-09-21 | End: 2022-01-26 | Stop reason: SDUPTHER

## 2020-10-09 ENCOUNTER — APPOINTMENT (OUTPATIENT)
Dept: CT IMAGING | Age: 55
End: 2020-10-09
Payer: MEDICARE

## 2020-10-09 ENCOUNTER — APPOINTMENT (OUTPATIENT)
Dept: GENERAL RADIOLOGY | Age: 55
End: 2020-10-09
Payer: MEDICARE

## 2020-10-09 ENCOUNTER — HOSPITAL ENCOUNTER (EMERGENCY)
Age: 55
Discharge: HOME | End: 2020-10-09
Attending: EMERGENCY MEDICINE
Payer: MEDICARE

## 2020-10-09 VITALS
WEIGHT: 180 LBS | TEMPERATURE: 97.3 F | DIASTOLIC BLOOD PRESSURE: 70 MMHG | BODY MASS INDEX: 29.99 KG/M2 | OXYGEN SATURATION: 96 % | RESPIRATION RATE: 22 BRPM | HEIGHT: 65 IN | HEART RATE: 73 BPM | SYSTOLIC BLOOD PRESSURE: 127 MMHG

## 2020-10-09 LAB
ABSOLUTE EOS #: 0.3 K/UL (ref 0–0.4)
ABSOLUTE IMMATURE GRANULOCYTE: ABNORMAL K/UL (ref 0–0.3)
ABSOLUTE LYMPH #: 3.1 K/UL (ref 1–4.8)
ABSOLUTE MONO #: 0.6 K/UL (ref 0.1–1.3)
ALBUMIN SERPL-MCNC: 4.4 G/DL (ref 3.5–5.2)
ALBUMIN/GLOBULIN RATIO: ABNORMAL (ref 1–2.5)
ALP BLD-CCNC: 100 U/L (ref 35–104)
ALT SERPL-CCNC: 15 U/L (ref 5–33)
ANION GAP SERPL CALCULATED.3IONS-SCNC: 12 MMOL/L (ref 9–17)
AST SERPL-CCNC: 17 U/L
BASOPHILS # BLD: 1 % (ref 0–2)
BASOPHILS ABSOLUTE: 0.1 K/UL (ref 0–0.2)
BILIRUB SERPL-MCNC: 0.31 MG/DL (ref 0.3–1.2)
BUN BLDV-MCNC: 13 MG/DL (ref 6–20)
BUN/CREAT BLD: ABNORMAL (ref 9–20)
C-REACTIVE PROTEIN: 0.5 MG/L (ref 0–5)
CALCIUM SERPL-MCNC: 9.9 MG/DL (ref 8.6–10.4)
CHLORIDE BLD-SCNC: 106 MMOL/L (ref 98–107)
CO2: 24 MMOL/L (ref 20–31)
CREAT SERPL-MCNC: 0.51 MG/DL (ref 0.5–0.9)
D-DIMER QUANTITATIVE: 0.55 MG/L FEU (ref 0–0.59)
DIFFERENTIAL TYPE: ABNORMAL
EOSINOPHILS RELATIVE PERCENT: 2 % (ref 0–4)
GFR AFRICAN AMERICAN: >60 ML/MIN
GFR NON-AFRICAN AMERICAN: >60 ML/MIN
GFR SERPL CREATININE-BSD FRML MDRD: ABNORMAL ML/MIN/{1.73_M2}
GFR SERPL CREATININE-BSD FRML MDRD: ABNORMAL ML/MIN/{1.73_M2}
GLUCOSE BLD-MCNC: 103 MG/DL (ref 70–99)
HCT VFR BLD CALC: 50.8 % (ref 36–46)
HEMOGLOBIN: 17.1 G/DL (ref 12–16)
IMMATURE GRANULOCYTES: ABNORMAL %
LYMPHOCYTES # BLD: 27 % (ref 24–44)
MCH RBC QN AUTO: 29.1 PG (ref 26–34)
MCHC RBC AUTO-ENTMCNC: 33.7 G/DL (ref 31–37)
MCV RBC AUTO: 86.6 FL (ref 80–100)
MONOCYTES # BLD: 5 % (ref 1–7)
NRBC AUTOMATED: ABNORMAL PER 100 WBC
PDW BLD-RTO: 15.7 % (ref 11.5–14.9)
PLATELET # BLD: 593 K/UL (ref 150–450)
PLATELET ESTIMATE: ABNORMAL
PMV BLD AUTO: 7.4 FL (ref 6–12)
POTASSIUM SERPL-SCNC: 3.8 MMOL/L (ref 3.7–5.3)
RBC # BLD: 5.87 M/UL (ref 4–5.2)
RBC # BLD: ABNORMAL 10*6/UL
SEG NEUTROPHILS: 65 % (ref 36–66)
SEGMENTED NEUTROPHILS ABSOLUTE COUNT: 7.5 K/UL (ref 1.3–9.1)
SODIUM BLD-SCNC: 142 MMOL/L (ref 135–144)
TOTAL PROTEIN: 7.7 G/DL (ref 6.4–8.3)
TROPONIN INTERP: NORMAL
TROPONIN INTERP: NORMAL
TROPONIN T: NORMAL NG/ML
TROPONIN T: NORMAL NG/ML
TROPONIN, HIGH SENSITIVITY: <6 NG/L (ref 0–14)
TROPONIN, HIGH SENSITIVITY: <6 NG/L (ref 0–14)
WBC # BLD: 11.5 K/UL (ref 3.5–11)
WBC # BLD: ABNORMAL 10*3/UL

## 2020-10-09 PROCEDURE — 85025 COMPLETE CBC W/AUTO DIFF WBC: CPT

## 2020-10-09 PROCEDURE — 36415 COLL VENOUS BLD VENIPUNCTURE: CPT

## 2020-10-09 PROCEDURE — 2580000003 HC RX 258: Performed by: EMERGENCY MEDICINE

## 2020-10-09 PROCEDURE — 80053 COMPREHEN METABOLIC PANEL: CPT

## 2020-10-09 PROCEDURE — 99284 EMERGENCY DEPT VISIT MOD MDM: CPT

## 2020-10-09 PROCEDURE — 84484 ASSAY OF TROPONIN QUANT: CPT

## 2020-10-09 PROCEDURE — 6370000000 HC RX 637 (ALT 250 FOR IP): Performed by: EMERGENCY MEDICINE

## 2020-10-09 PROCEDURE — 71045 X-RAY EXAM CHEST 1 VIEW: CPT

## 2020-10-09 PROCEDURE — 86140 C-REACTIVE PROTEIN: CPT

## 2020-10-09 PROCEDURE — 85379 FIBRIN DEGRADATION QUANT: CPT

## 2020-10-09 PROCEDURE — 71260 CT THORAX DX C+: CPT

## 2020-10-09 PROCEDURE — 93005 ELECTROCARDIOGRAM TRACING: CPT | Performed by: EMERGENCY MEDICINE

## 2020-10-09 PROCEDURE — 6360000004 HC RX CONTRAST MEDICATION: Performed by: EMERGENCY MEDICINE

## 2020-10-09 RX ORDER — ALBUTEROL SULFATE 90 UG/1
2 AEROSOL, METERED RESPIRATORY (INHALATION) 4 TIMES DAILY
Status: DISCONTINUED | OUTPATIENT
Start: 2020-10-09 | End: 2020-10-09 | Stop reason: HOSPADM

## 2020-10-09 RX ORDER — 0.9 % SODIUM CHLORIDE 0.9 %
80 INTRAVENOUS SOLUTION INTRAVENOUS ONCE
Status: COMPLETED | OUTPATIENT
Start: 2020-10-09 | End: 2020-10-09

## 2020-10-09 RX ORDER — SODIUM CHLORIDE 0.9 % (FLUSH) 0.9 %
10 SYRINGE (ML) INJECTION PRN
Status: DISCONTINUED | OUTPATIENT
Start: 2020-10-09 | End: 2020-10-09 | Stop reason: HOSPADM

## 2020-10-09 RX ORDER — GUAIFENESIN/DEXTROMETHORPHAN 100-10MG/5
5 SYRUP ORAL 3 TIMES DAILY PRN
Qty: 120 ML | Refills: 0 | Status: SHIPPED | OUTPATIENT
Start: 2020-10-09 | End: 2020-10-19

## 2020-10-09 RX ORDER — ACETAMINOPHEN 500 MG
1000 TABLET ORAL ONCE
Status: COMPLETED | OUTPATIENT
Start: 2020-10-09 | End: 2020-10-09

## 2020-10-09 RX ORDER — PREDNISONE 10 MG/1
40 TABLET ORAL DAILY
Qty: 20 TABLET | Refills: 0 | Status: SHIPPED | OUTPATIENT
Start: 2020-10-09 | End: 2020-10-14

## 2020-10-09 RX ADMIN — ACETAMINOPHEN 1000 MG: 500 TABLET, FILM COATED ORAL at 11:53

## 2020-10-09 RX ADMIN — IPRATROPIUM BROMIDE 2 PUFF: 17 AEROSOL, METERED RESPIRATORY (INHALATION) at 11:54

## 2020-10-09 RX ADMIN — IOVERSOL 75 ML: 741 INJECTION INTRA-ARTERIAL; INTRAVENOUS at 12:34

## 2020-10-09 RX ADMIN — SODIUM CHLORIDE 80 ML: 9 INJECTION, SOLUTION INTRAVENOUS at 12:34

## 2020-10-09 RX ADMIN — Medication 10 ML: at 12:34

## 2020-10-09 RX ADMIN — ALBUTEROL SULFATE 2 PUFF: 90 AEROSOL, METERED RESPIRATORY (INHALATION) at 11:54

## 2020-10-09 ASSESSMENT — PAIN DESCRIPTION - LOCATION: LOCATION: CHEST

## 2020-10-09 ASSESSMENT — ENCOUNTER SYMPTOMS
WHEEZING: 1
COUGH: 1
ABDOMINAL PAIN: 0
SHORTNESS OF BREATH: 1
VOMITING: 0
NAUSEA: 0

## 2020-10-09 ASSESSMENT — PAIN SCALES - GENERAL
PAINLEVEL_OUTOF10: 4
PAINLEVEL_OUTOF10: 4

## 2020-10-09 ASSESSMENT — PAIN DESCRIPTION - PAIN TYPE: TYPE: ACUTE PAIN

## 2020-10-09 ASSESSMENT — PAIN DESCRIPTION - ORIENTATION: ORIENTATION: LEFT

## 2020-10-09 ASSESSMENT — PAIN DESCRIPTION - FREQUENCY: FREQUENCY: INTERMITTENT

## 2020-10-09 ASSESSMENT — PAIN DESCRIPTION - DESCRIPTORS: DESCRIPTORS: SHARP

## 2020-10-09 NOTE — ED TRIAGE NOTES
Mode of arrival (squad #, walk in, police, etc) : Karrie Lanier in           Chief complaint(s): Chest pain with inspiration, cough         Arrival Note (brief scenario, treatment PTA, etc). : Pt c/o pain in chest with breathing. C= \"Have you ever felt that you should Cut down on your drinking? \"  No  A= \"Have people Annoyed you by criticizing your drinking? \"  No  G= \"Have you ever felt bad or Guilty about your drinking? \"  No  E= \"Have you ever had a drink as an Eye-opener first thing in the morning to steady your nerves or to help a hangover? \"  No      Deferred []      Reason for deferring: N/A    *If yes to two or more: probable alcohol abuse. *

## 2020-10-09 NOTE — ED PROVIDER NOTES
EMERGENCY DEPARTMENT ENCOUNTER    Pt Name: Natanael Sharma  MRN: [id number]  [place] 1965  Date of evaluation: 10/9/20  CHIEF COMPLAINT       Chief Complaint   Patient presents with    Chest Pain    Cough     HISTORY OF PRESENT ILLNESS     Cough   Cough characteristics:  Non-productive  Sputum characteristics:  Nondescript  Severity:  Moderate  Onset quality:  Gradual  Duration:  2 days  Timing:  Constant  Progression:  Unchanged  Chronicity:  Recurrent (copd and bronchitis)  Smoker: yes    Context: upper respiratory infection and weather changes    Relieved by:  Nothing  Worsened by:  Nothing  Ineffective treatments:  None tried  Associated symptoms: chest pain, shortness of breath and wheezing    Associated symptoms comment:  Pleuritic left side cp when coughing and taking deep breath  denies leg edema  No recent travel        REVIEW OF SYSTEMS     Review of Systems   Respiratory: Positive for cough, shortness of breath and wheezing. Cardiovascular: Positive for chest pain. Negative for palpitations and leg swelling. Gastrointestinal: Negative for abdominal pain, nausea and vomiting. All other systems reviewed and are negative.     PASTMEDICAL HISTORY     Past Medical History:   Diagnosis Date    Chronic kidney disease     CKD (chronic kidney disease) stage 1, GFR 90 ml/min or greater     COPD (chronic obstructive pulmonary disease) (HCC)     Depression     Hyperlipidemia     Hypocalcemia     IBS (irritable bowel syndrome)     Proteinuria      Past Problem List  Patient Active Problem List   Diagnosis Code    IBS (irritable bowel syndrome) K58.9    Depression F32.9    Proteinuria R80.9    CKD (chronic kidney disease) stage 1, GFR 90 ml/min or greater N18.1    Mixed hyperlipidemia E78.2    Thyroid nodule E04.1    Chronic obstructive pulmonary disease (HCC) J44.9    Family history of colon cancer Z80.0    Personal history of smoking Z87.891    Calcification of right breast R92.1    Breast calcification, left R92.1    Incomplete rectal prolapse-Congenital defect.  Surgery at 5 yo K62.3    Prediabetes R73.03    Acute pain of left knee M25.562    Intramural leiomyoma of uterus D25.1    PMB (postmenopausal bleeding) N95.0    Adrenal adenoma D35.00    Endometrial thickening on ultrasound R93.89    Adrenal nodule (HCC) E27.8    Pain of left lower extremity M79.605    Instability of left knee joint M25.362     SURGICAL HISTORY       Past Surgical History:   Procedure Laterality Date     SECTION      CS x 2    COLONOSCOPY      TUBAL LIGATION Bilateral      CURRENT MEDICATIONS       Discharge Medication List as of 10/9/2020  2:22 PM      CONTINUE these medications which have NOT CHANGED    Details   albuterol sulfate HFA (VENTOLIN HFA) 108 (90 Base) MCG/ACT inhaler Inhale 2 puffs into the lungs every 6 hours as needed for Wheezing, Disp-1 Inhaler,R-3Normal      SPIRIVA RESPIMAT 1.25 MCG/ACT AERS inhaler INHALE 2 PUFFS BY MOUTH INTO THE LUNGS ONCE DAILY, Disp-4 g,R-2Normal      ASPIRIN LOW DOSE 81 MG EC tablet TAKE 1 TABLET BY MOUTH DAILY, Disp-30 tablet,R-3Normal      ibuprofen (ADVIL;MOTRIN) 800 MG tablet TAKE 1 TABLET BY MOUTH EVERY 6 HOURS AS NEEDED FOR PAIN, Disp-90 tablet,R-1Normal      atorvastatin (LIPITOR) 40 MG tablet TAKE 1 TABLET BY MOUTH DAILY, Disp-90 tablet,R-1Normal      fluticasone (FLONASE) 50 MCG/ACT nasal spray 1 spray by Nasal route 2 times daily for 14 days, Disp-1 Bottle,R-2Normal      cyclobenzaprine (FLEXERIL) 10 MG tablet TAKE 1 TABLET BY MOUTH TWICE DAILY AS NEEDED FOR MUSCLE SPASMS, Disp-60 tablet,R-0Normal      guaiFENesin (MUCINEX) 600 MG extended release tablet Take 1 tablet by mouth 2 times daily, Disp-60 tablet, R-0Normal      hydrocortisone 2.5 % cream APPLY TO AFFECTED AREA BID, R-3, Historical Med      nicotine (NICODERM CQ) 14 MG/24HR Place 1 patch onto the skin every 24 hours, Disp-30 patch, R-3Normal      nicotine polacrilex (NICORETTE) 2 MG gum Take 1 each by mouth as needed for Smoking cessation, Disp-110 each, R-3Normal      lidocaine (LMX) 4 % cream Apply topically every 8 hrs as needed for pain, Disp-45 g, R-3, Normal      Elastic Bandages & Supports (KNEE BRACE/HINGED/LARGE) MISC Use daily for knee pain, Disp-1 each, R-0Print      loratadine (CLARITIN) 10 MG tablet Take 1 tablet by mouth daily, Disp-30 tablet, R-0Normal      Blood Pressure Monitor KIT Disp-1 kit, R-0, NormalTAKE BLOOD PRESSURE DAILY           ALLERGIES     has No Known Allergies. FAMILY HISTORY     She indicated that her mother is . She indicated that her father is . SOCIAL HISTORY       Social History     Tobacco Use    Smoking status: Current Some Day Smoker     Packs/day: 1.00     Years: 28.00     Pack years: 28.00     Types: Cigarettes    Smokeless tobacco: Never Used    Tobacco comment: down to 5 cigs per day   Substance Use Topics    Alcohol use: No     Alcohol/week: 0.0 standard drinks    Drug use: No     PHYSICAL EXAM     INITIAL VITALS: /70   Pulse 73   Temp 97.3 °F (36.3 °C) (Temporal)   Resp 22   Ht 5' 5\" (1.651 m)   Wt 180 lb (81.6 kg)   LMP 2016   SpO2 96%   BMI 29.95 kg/m²    Physical Exam  Constitutional:       General: She is not in acute distress. Appearance: Normal appearance. She is well-developed. She is not diaphoretic. HENT:      Head: Normocephalic and atraumatic. Right Ear: External ear normal.      Left Ear: External ear normal.      Nose: Nose normal. No congestion. Mouth/Throat:      Mouth: Mucous membranes are moist.      Pharynx: Oropharynx is clear. Eyes:      General:         Right eye: No discharge. Left eye: No discharge. Conjunctiva/sclera: Conjunctivae normal.      Pupils: Pupils are equal, round, and reactive to light. Neck:      Musculoskeletal: Normal range of motion and neck supple. Trachea: No tracheal deviation.    Cardiovascular:      Rate and Rhythm: Normal rate and regular rhythm. Pulses: Normal pulses. Heart sounds: Normal heart sounds. Comments: Radial pulses and pt 2+ BL  Pulmonary:      Effort: Pulmonary effort is normal. No respiratory distress. Breath sounds: Normal breath sounds. No stridor. No wheezing or rales. Comments: Left side chest wall tenderness  Chest:      Chest wall: Tenderness present. Abdominal:      Palpations: Abdomen is soft. Tenderness: There is no abdominal tenderness. There is no guarding or rebound. Musculoskeletal: Normal range of motion. General: No tenderness or deformity. Right lower leg: No edema. Left lower leg: No edema. Skin:     General: Skin is warm and dry. Capillary Refill: Capillary refill takes less than 2 seconds. Findings: No erythema or rash. Neurological:      General: No focal deficit present. Mental Status: She is alert and oriented to person, place, and time. Cranial Nerves: No cranial nerve deficit. Coordination: Coordination normal.   Psychiatric:         Mood and Affect: Mood normal.         Behavior: Behavior normal.         Thought Content: Thought content normal.         Judgment: Judgment normal.         MEDICAL DECISION MAKING:   Do not suspect pe or ami or AD or sepsis or pneumonia  Discussed with patient anticipatory guidance, discharge instructions, follow up PCP 24 hours           Procedures    DIAGNOSTIC RESULTS   EKG:All EKG's are interpreted by the Emergency Department Physician who either signs or Co-signs this chart in the absence of a cardiologist.  Nonspecific changes, no acute ischemic changes, normal rate and intervals      RADIOLOGY:All plain film, CT, MRI, and formal ultrasound images (except ED bedside ultrasound) are read by the radiologist, see reports below, unless otherwisenoted in MDM or here.   CT CHEST PULMONARY EMBOLISM W CONTRAST   Preliminary Result   No evidence of pulmonary embolism or acute pulmonary abnormality. Incidentally noted are hypodense lesions in both adrenal glands, Hounsfield   numbers compatible with adenomas, and hepatic cysts. RECOMMENDATIONS:   No further workup recommended for hepatic lesions. XR CHEST PORTABLE   Final Result   Negative chest radiograph. LABS: All lab results were reviewed by myself, and all abnormals are listed below. Labs Reviewed   CBC WITH AUTO DIFFERENTIAL - Abnormal; Notable for the following components:       Result Value    WBC 11.5 (*)     RBC 5.87 (*)     Hemoglobin 17.1 (*)     Hematocrit 50.8 (*)     RDW 15.7 (*)     Platelets 618 (*)     All other components within normal limits   COMPREHENSIVE METABOLIC PANEL - Abnormal; Notable for the following components:    Glucose 103 (*)     All other components within normal limits   TROPONIN   TROPONIN   C-REACTIVE PROTEIN   D-DIMER, QUANTITATIVE       EMERGENCY DEPARTMENTCOURSE:         Vitals:    Vitals:    10/09/20 1330 10/09/20 1345 10/09/20 1400 10/09/20 1415   BP: 134/70 131/63 139/75 127/70   Pulse: 59 69 65 73   Resp: 21 18 26 22   Temp:       TempSrc:       SpO2: 92% 95% 94% 96%   Weight:       Height:           The patient was given the following medications while in the emergency department:  Orders Placed This Encounter   Medications    acetaminophen (TYLENOL) tablet 1,000 mg    DISCONTD: albuterol sulfate  (90 Base) MCG/ACT inhaler 2 puff    DISCONTD: ipratropium (ATROVENT HFA) 17 MCG/ACT inhaler 2 puff    ioversol (OPTIRAY) 74 % injection 75 mL    DISCONTD: sodium chloride flush 0.9 % injection 10 mL    0.9 % sodium chloride bolus    guaiFENesin-dextromethorphan (ROBITUSSIN DM) 100-10 MG/5ML syrup     Sig: Take 5 mLs by mouth 3 times daily as needed for Cough     Dispense:  120 mL     Refill:  0    predniSONE (DELTASONE) 10 MG tablet     Sig: Take 4 tablets by mouth daily for 5 days     Dispense:  20 tablet     Refill:  0       FINAL IMPRESSION      1.  Chest pain,

## 2020-10-09 NOTE — ED NOTES
Pt placed on cardiac monitor. Pt is eupneic, A&OX4, and PWD. Call light in reach.       Melita Alvarado RN  10/09/20 9585

## 2020-10-10 LAB
EKG ATRIAL RATE: 63 BPM
EKG P AXIS: 67 DEGREES
EKG P-R INTERVAL: 152 MS
EKG Q-T INTERVAL: 420 MS
EKG QRS DURATION: 82 MS
EKG QTC CALCULATION (BAZETT): 429 MS
EKG R AXIS: 32 DEGREES
EKG T AXIS: 56 DEGREES
EKG VENTRICULAR RATE: 63 BPM

## 2020-10-10 PROCEDURE — 93010 ELECTROCARDIOGRAM REPORT: CPT | Performed by: INTERNAL MEDICINE

## 2020-10-12 ENCOUNTER — TELEPHONE (OUTPATIENT)
Dept: FAMILY MEDICINE CLINIC | Age: 55
End: 2020-10-12

## 2020-10-12 NOTE — TELEPHONE ENCOUNTER
[facility]) ED Follow up Call    Reason for ED visit:  Chest Pain , Cough    10/12/2020     Herve Muñoz , this is valeria  from Dr. Olga Tipton office, just calling to see how you are doing after your recent ED visit. Did you receive discharge instructions? Yes  Do you understand the discharge instructions? Yes  Did the ED give you any new prescriptions? Yes  Were you able to fill your prescriptions? Yes      Do you have one of our red, yellow and green  Zone sheets that help you to determine when you should go to the ED? Not Applicable      Do you need or want to make a follow up appt with your PCP? Yes    Do you have any further needs in the home, e.g. equipment?   Not Applicable        FU appts/Provider:    Future Appointments   Date Time Provider Tiki Lewis   10/15/2020  1:15 PM [facility]

## 2020-10-13 PROBLEM — R15.9 INCONTINENCE OF FECES: Status: ACTIVE | Noted: 2020-10-13

## 2020-10-13 PROBLEM — Q42.3 IMPERFORATE ANUS: Status: ACTIVE | Noted: 2020-10-13

## 2020-10-26 RX ORDER — ASPIRIN 81 MG/1
TABLET ORAL
Qty: 90 TABLET | Refills: 3 | Status: SHIPPED | OUTPATIENT
Start: 2020-10-26 | End: 2022-01-26 | Stop reason: SDUPTHER

## 2020-10-26 NOTE — TELEPHONE ENCOUNTER
Please Approve or Refuse.   Send to Pharmacy per Pt's Request:     Next Visit Date:  Visit date not found   Last Visit Date: 3/13/2020    Hemoglobin A1C (%)   Date Value   08/01/2019 5.6   01/24/2019 5.8   08/23/2018 5.8             ( goal A1C is < 7)   BP Readings from Last 3 Encounters:   10/09/20 127/70   03/13/20 118/78   03/09/20 (!) 150/75          (goal 120/80)  BUN   Date Value Ref Range Status   10/09/2020 13 6 - 20 mg/dL Final     CREATININE   Date Value Ref Range Status   10/09/2020 0.51 0.50 - 0.90 mg/dL Final     Potassium   Date Value Ref Range Status   10/09/2020 3.8 3.7 - 5.3 mmol/L Final

## 2021-02-05 ENCOUNTER — CARE COORDINATION (OUTPATIENT)
Dept: CARE COORDINATION | Age: 56
End: 2021-02-05

## 2021-02-05 NOTE — CARE COORDINATION
Call placed to patient for CC Enrollment. No answer received. Message left requesting callback. Call back information provided. Introductory letter sent to patient via [employer]. Will F/U in one week if no response.

## 2021-02-05 NOTE — LETTER
2/5/2021    [address] [facility],[address] [place]      Dear Ms. Maurice Sims,    My name is Terrence Brush. I am a registered nurse who partners with Tanisha Lemus MD to improve patients' health and I would like to offer my service to you. As a member of your health care team, I work with Dr. Fili Merida and other providers involved in your care, offer education for your specific health conditions and connect you with additional resources as needed. The additional support I provide is no additional cost to you. My primary focus is to support you in following your treatment plan, help you achieve specific goals, and improve your health. We are committed to walk with you on this journey and look forward to working with you. Please call me to further discuss your healthcare needs. I look forward to speaking with you. You can reach me at [phone].     In good health,     Terrence Brush RN  Ambulatory Care Manager  [facility]

## 2021-02-11 ENCOUNTER — CARE COORDINATION (OUTPATIENT)
Dept: CARE COORDINATION | Age: 56
End: 2021-02-11

## 2021-02-19 ENCOUNTER — CARE COORDINATION (OUTPATIENT)
Dept: CARE COORDINATION | Age: 56
End: 2021-02-19

## 2021-02-19 NOTE — CARE COORDINATION
Second call placed to patient for CC Enrollment. No answer received. Message left requesting callback. Call back information provided. Introductory letter sent to patient via [employer]PS on 11 February 2021. Will make final attempt to enroll in one week if no response.

## 2021-02-26 ENCOUNTER — CARE COORDINATION (OUTPATIENT)
Dept: CARE COORDINATION | Age: 56
End: 2021-02-26

## 2021-10-07 ENCOUNTER — APPOINTMENT (OUTPATIENT)
Dept: GENERAL RADIOLOGY | Age: 56
DRG: 201 | End: 2021-10-07
Payer: MEDICARE

## 2021-10-07 ENCOUNTER — NURSE TRIAGE (OUTPATIENT)
Dept: OTHER | Facility: CLINIC | Age: 56
End: 2021-10-07

## 2021-10-07 ENCOUNTER — HOSPITAL ENCOUNTER (INPATIENT)
Age: 56
LOS: 2 days | Discharge: HOME | DRG: 201 | End: 2021-10-09
Attending: EMERGENCY MEDICINE | Admitting: INTERNAL MEDICINE
Payer: MEDICARE

## 2021-10-07 DIAGNOSIS — J41.0 SIMPLE CHRONIC BRONCHITIS (HCC): ICD-10-CM

## 2021-10-07 DIAGNOSIS — R07.9 CHEST PAIN, UNSPECIFIED TYPE: Primary | ICD-10-CM

## 2021-10-07 LAB
-: NORMAL
ABSOLUTE EOS #: 0.2 K/UL (ref 0–0.4)
ABSOLUTE IMMATURE GRANULOCYTE: ABNORMAL K/UL (ref 0–0.3)
ABSOLUTE LYMPH #: 2.6 K/UL (ref 1–4.8)
ABSOLUTE MONO #: 0.6 K/UL (ref 0.1–1.3)
ALBUMIN SERPL-MCNC: 4.3 G/DL (ref 3.5–5.2)
ALBUMIN/GLOBULIN RATIO: ABNORMAL (ref 1–2.5)
ALP BLD-CCNC: 105 U/L (ref 35–104)
ALT SERPL-CCNC: 16 U/L (ref 5–33)
ANION GAP SERPL CALCULATED.3IONS-SCNC: 10 MMOL/L (ref 9–17)
AST SERPL-CCNC: 13 U/L
BASOPHILS # BLD: 1 % (ref 0–2)
BASOPHILS ABSOLUTE: 0.1 K/UL (ref 0–0.2)
BILIRUB SERPL-MCNC: 0.31 MG/DL (ref 0.3–1.2)
BNP INTERPRETATION: NORMAL
BUN BLDV-MCNC: 12 MG/DL (ref 6–20)
BUN/CREAT BLD: ABNORMAL (ref 9–20)
CALCIUM SERPL-MCNC: 9.5 MG/DL (ref 8.6–10.4)
CHLORIDE BLD-SCNC: 108 MMOL/L (ref 98–107)
CO2: 25 MMOL/L (ref 20–31)
CREAT SERPL-MCNC: 0.55 MG/DL (ref 0.5–0.9)
DIFFERENTIAL TYPE: ABNORMAL
EOSINOPHILS RELATIVE PERCENT: 2 % (ref 0–4)
GFR AFRICAN AMERICAN: >60 ML/MIN
GFR NON-AFRICAN AMERICAN: >60 ML/MIN
GFR SERPL CREATININE-BSD FRML MDRD: ABNORMAL ML/MIN/{1.73_M2}
GFR SERPL CREATININE-BSD FRML MDRD: ABNORMAL ML/MIN/{1.73_M2}
GLUCOSE BLD-MCNC: 102 MG/DL (ref 70–99)
HCG QUALITATIVE: NEGATIVE
HCT VFR BLD CALC: 48.3 % (ref 36–46)
HEMOGLOBIN: 16.4 G/DL (ref 12–16)
IMMATURE GRANULOCYTES: ABNORMAL %
LYMPHOCYTES # BLD: 25 % (ref 24–44)
MCH RBC QN AUTO: 29.5 PG (ref 26–34)
MCHC RBC AUTO-ENTMCNC: 33.9 G/DL (ref 31–37)
MCV RBC AUTO: 87.1 FL (ref 80–100)
MONOCYTES # BLD: 6 % (ref 1–7)
NRBC AUTOMATED: ABNORMAL PER 100 WBC
PDW BLD-RTO: 15.8 % (ref 11.5–14.9)
PLATELET # BLD: 570 K/UL (ref 150–450)
PLATELET ESTIMATE: ABNORMAL
PMV BLD AUTO: 7.3 FL (ref 6–12)
POTASSIUM SERPL-SCNC: 4.1 MMOL/L (ref 3.7–5.3)
PRO-BNP: 141 PG/ML
RBC # BLD: 5.54 M/UL (ref 4–5.2)
RBC # BLD: ABNORMAL 10*6/UL
REASON FOR REJECTION: NORMAL
SARS-COV-2, RAPID: NOT DETECTED
SEG NEUTROPHILS: 66 % (ref 36–66)
SEGMENTED NEUTROPHILS ABSOLUTE COUNT: 7 K/UL (ref 1.3–9.1)
SODIUM BLD-SCNC: 143 MMOL/L (ref 135–144)
SPECIMEN DESCRIPTION: NORMAL
T4 TOTAL: 7.1 UG/DL (ref 4.5–10.9)
TOTAL PROTEIN: 7.5 G/DL (ref 6.4–8.3)
TROPONIN INTERP: NORMAL
TROPONIN INTERP: NORMAL
TROPONIN T: NORMAL NG/ML
TROPONIN T: NORMAL NG/ML
TROPONIN, HIGH SENSITIVITY: <6 NG/L (ref 0–14)
TROPONIN, HIGH SENSITIVITY: <6 NG/L (ref 0–14)
TSH SERPL DL<=0.05 MIU/L-ACNC: 0.52 MIU/L (ref 0.3–5)
WBC # BLD: 10.5 K/UL (ref 3.5–11)
WBC # BLD: ABNORMAL 10*3/UL
ZZ NTE CLEAN UP: ORDERED TEST: NORMAL
ZZ NTE WITH NAME CLEAN UP: SPECIMEN SOURCE: NORMAL

## 2021-10-07 PROCEDURE — 6360000002 HC RX W HCPCS

## 2021-10-07 PROCEDURE — 85025 COMPLETE CBC W/AUTO DIFF WBC: CPT

## 2021-10-07 PROCEDURE — 6370000000 HC RX 637 (ALT 250 FOR IP): Performed by: EMERGENCY MEDICINE

## 2021-10-07 PROCEDURE — 99223 1ST HOSP IP/OBS HIGH 75: CPT | Performed by: INTERNAL MEDICINE

## 2021-10-07 PROCEDURE — 93005 ELECTROCARDIOGRAM TRACING: CPT | Performed by: EMERGENCY MEDICINE

## 2021-10-07 PROCEDURE — 84443 ASSAY THYROID STIM HORMONE: CPT

## 2021-10-07 PROCEDURE — 83880 ASSAY OF NATRIURETIC PEPTIDE: CPT

## 2021-10-07 PROCEDURE — 93005 ELECTROCARDIOGRAM TRACING: CPT | Performed by: NURSE PRACTITIONER

## 2021-10-07 PROCEDURE — 71045 X-RAY EXAM CHEST 1 VIEW: CPT

## 2021-10-07 PROCEDURE — 2060000000 HC ICU INTERMEDIATE R&B

## 2021-10-07 PROCEDURE — 2500000003 HC RX 250 WO HCPCS: Performed by: INTERNAL MEDICINE

## 2021-10-07 PROCEDURE — 6360000002 HC RX W HCPCS: Performed by: STUDENT IN AN ORGANIZED HEALTH CARE EDUCATION/TRAINING PROGRAM

## 2021-10-07 PROCEDURE — 2580000003 HC RX 258

## 2021-10-07 PROCEDURE — 87635 SARS-COV-2 COVID-19 AMP PRB: CPT

## 2021-10-07 PROCEDURE — 99285 EMERGENCY DEPT VISIT HI MDM: CPT

## 2021-10-07 PROCEDURE — 84484 ASSAY OF TROPONIN QUANT: CPT

## 2021-10-07 PROCEDURE — 80053 COMPREHEN METABOLIC PANEL: CPT

## 2021-10-07 PROCEDURE — 84436 ASSAY OF TOTAL THYROXINE: CPT

## 2021-10-07 PROCEDURE — 36415 COLL VENOUS BLD VENIPUNCTURE: CPT

## 2021-10-07 PROCEDURE — 6370000000 HC RX 637 (ALT 250 FOR IP)

## 2021-10-07 PROCEDURE — 84703 CHORIONIC GONADOTROPIN ASSAY: CPT

## 2021-10-07 RX ORDER — SODIUM CHLORIDE 0.9 % (FLUSH) 0.9 %
5-40 SYRINGE (ML) INJECTION PRN
Status: DISCONTINUED | OUTPATIENT
Start: 2021-10-07 | End: 2021-10-09 | Stop reason: HOSPADM

## 2021-10-07 RX ORDER — ATROPINE SULFATE 0.1 MG/ML
0.5 INJECTION INTRAVENOUS EVERY 5 MIN PRN
Status: ACTIVE | OUTPATIENT
Start: 2021-10-07 | End: 2021-10-07

## 2021-10-07 RX ORDER — CYCLOBENZAPRINE HCL 10 MG
10 TABLET ORAL 2 TIMES DAILY PRN
Status: DISCONTINUED | OUTPATIENT
Start: 2021-10-07 | End: 2021-10-09 | Stop reason: HOSPADM

## 2021-10-07 RX ORDER — ONDANSETRON 2 MG/ML
4 INJECTION INTRAMUSCULAR; INTRAVENOUS EVERY 6 HOURS PRN
Status: DISCONTINUED | OUTPATIENT
Start: 2021-10-07 | End: 2021-10-09 | Stop reason: HOSPADM

## 2021-10-07 RX ORDER — METHYLPREDNISOLONE SODIUM SUCCINATE 125 MG/2ML
60 INJECTION, POWDER, LYOPHILIZED, FOR SOLUTION INTRAMUSCULAR; INTRAVENOUS DAILY
Status: COMPLETED | OUTPATIENT
Start: 2021-10-07 | End: 2021-10-08

## 2021-10-07 RX ORDER — SODIUM CHLORIDE 9 MG/ML
INJECTION, SOLUTION INTRAVENOUS CONTINUOUS
Status: DISCONTINUED | OUTPATIENT
Start: 2021-10-07 | End: 2021-10-09 | Stop reason: HOSPADM

## 2021-10-07 RX ORDER — ONDANSETRON 4 MG/1
4 TABLET, ORALLY DISINTEGRATING ORAL EVERY 8 HOURS PRN
Status: DISCONTINUED | OUTPATIENT
Start: 2021-10-07 | End: 2021-10-09 | Stop reason: HOSPADM

## 2021-10-07 RX ORDER — POLYETHYLENE GLYCOL 3350 17 G/17G
17 POWDER, FOR SOLUTION ORAL DAILY PRN
Status: DISCONTINUED | OUTPATIENT
Start: 2021-10-07 | End: 2021-10-09 | Stop reason: HOSPADM

## 2021-10-07 RX ORDER — ALBUTEROL SULFATE 90 UG/1
2 AEROSOL, METERED RESPIRATORY (INHALATION) EVERY 6 HOURS PRN
Status: DISCONTINUED | OUTPATIENT
Start: 2021-10-07 | End: 2021-10-09 | Stop reason: HOSPADM

## 2021-10-07 RX ORDER — ATORVASTATIN CALCIUM 40 MG/1
40 TABLET, FILM COATED ORAL DAILY
Status: DISCONTINUED | OUTPATIENT
Start: 2021-10-07 | End: 2021-10-09 | Stop reason: HOSPADM

## 2021-10-07 RX ORDER — ACETAMINOPHEN 325 MG/1
650 TABLET ORAL EVERY 6 HOURS PRN
Status: DISCONTINUED | OUTPATIENT
Start: 2021-10-07 | End: 2021-10-09 | Stop reason: HOSPADM

## 2021-10-07 RX ORDER — ASPIRIN 325 MG
325 TABLET ORAL ONCE
Status: COMPLETED | OUTPATIENT
Start: 2021-10-07 | End: 2021-10-07

## 2021-10-07 RX ORDER — ALBUTEROL SULFATE 90 UG/1
2 AEROSOL, METERED RESPIRATORY (INHALATION) PRN
Status: ACTIVE | OUTPATIENT
Start: 2021-10-07 | End: 2021-10-07

## 2021-10-07 RX ORDER — SODIUM CHLORIDE 9 MG/ML
25 INJECTION, SOLUTION INTRAVENOUS PRN
Status: DISCONTINUED | OUTPATIENT
Start: 2021-10-07 | End: 2021-10-09 | Stop reason: HOSPADM

## 2021-10-07 RX ORDER — FAMOTIDINE 20 MG/1
20 TABLET, FILM COATED ORAL 2 TIMES DAILY
Status: DISCONTINUED | OUTPATIENT
Start: 2021-10-07 | End: 2021-10-09 | Stop reason: HOSPADM

## 2021-10-07 RX ORDER — SODIUM CHLORIDE 9 MG/ML
500 INJECTION, SOLUTION INTRAVENOUS CONTINUOUS PRN
Status: ACTIVE | OUTPATIENT
Start: 2021-10-07 | End: 2021-10-07

## 2021-10-07 RX ORDER — METOPROLOL TARTRATE 5 MG/5ML
5 INJECTION INTRAVENOUS EVERY 5 MIN PRN
Status: ACTIVE | OUTPATIENT
Start: 2021-10-07 | End: 2021-10-07

## 2021-10-07 RX ORDER — NITROGLYCERIN 0.4 MG/1
0.4 TABLET SUBLINGUAL EVERY 5 MIN PRN
Status: ACTIVE | OUTPATIENT
Start: 2021-10-07 | End: 2021-10-07

## 2021-10-07 RX ORDER — METOPROLOL TARTRATE 5 MG/5ML
5 INJECTION INTRAVENOUS EVERY 6 HOURS PRN
Status: DISCONTINUED | OUTPATIENT
Start: 2021-10-07 | End: 2021-10-08

## 2021-10-07 RX ORDER — MORPHINE SULFATE 4 MG/ML
4 INJECTION, SOLUTION INTRAMUSCULAR; INTRAVENOUS ONCE
Status: DISCONTINUED | OUTPATIENT
Start: 2021-10-08 | End: 2021-10-09 | Stop reason: HOSPADM

## 2021-10-07 RX ORDER — ASPIRIN 81 MG/1
81 TABLET ORAL DAILY
Status: DISCONTINUED | OUTPATIENT
Start: 2021-10-07 | End: 2021-10-09 | Stop reason: HOSPADM

## 2021-10-07 RX ORDER — SODIUM CHLORIDE 0.9 % (FLUSH) 0.9 %
5-40 SYRINGE (ML) INJECTION EVERY 12 HOURS SCHEDULED
Status: DISCONTINUED | OUTPATIENT
Start: 2021-10-07 | End: 2021-10-09 | Stop reason: HOSPADM

## 2021-10-07 RX ORDER — ACETAMINOPHEN 650 MG/1
650 SUPPOSITORY RECTAL EVERY 6 HOURS PRN
Status: DISCONTINUED | OUTPATIENT
Start: 2021-10-07 | End: 2021-10-09 | Stop reason: HOSPADM

## 2021-10-07 RX ORDER — NICOTINE 21 MG/24HR
1 PATCH, TRANSDERMAL 24 HOURS TRANSDERMAL EVERY 24 HOURS
Status: DISCONTINUED | OUTPATIENT
Start: 2021-10-07 | End: 2021-10-09 | Stop reason: HOSPADM

## 2021-10-07 RX ORDER — GUAIFENESIN 600 MG/1
600 TABLET, EXTENDED RELEASE ORAL 2 TIMES DAILY
Status: DISCONTINUED | OUTPATIENT
Start: 2021-10-07 | End: 2021-10-09 | Stop reason: HOSPADM

## 2021-10-07 RX ORDER — SODIUM CHLORIDE 0.9 % (FLUSH) 0.9 %
5-40 SYRINGE (ML) INJECTION PRN
Status: ACTIVE | OUTPATIENT
Start: 2021-10-07 | End: 2021-10-07

## 2021-10-07 RX ORDER — FLUTICASONE PROPIONATE 50 MCG
1 SPRAY, SUSPENSION (ML) NASAL 2 TIMES DAILY
Status: DISCONTINUED | OUTPATIENT
Start: 2021-10-07 | End: 2021-10-09 | Stop reason: HOSPADM

## 2021-10-07 RX ADMIN — METHYLPREDNISOLONE SODIUM SUCCINATE 60 MG: 125 INJECTION, POWDER, FOR SOLUTION INTRAMUSCULAR; INTRAVENOUS at 18:06

## 2021-10-07 RX ADMIN — ENOXAPARIN SODIUM 40 MG: 40 INJECTION SUBCUTANEOUS at 17:39

## 2021-10-07 RX ADMIN — SODIUM CHLORIDE: 9 INJECTION, SOLUTION INTRAVENOUS at 18:07

## 2021-10-07 RX ADMIN — GUAIFENESIN 600 MG: 600 TABLET, EXTENDED RELEASE ORAL at 20:58

## 2021-10-07 RX ADMIN — ATORVASTATIN CALCIUM 40 MG: 40 TABLET, FILM COATED ORAL at 18:06

## 2021-10-07 RX ADMIN — FAMOTIDINE 20 MG: 20 TABLET ORAL at 20:58

## 2021-10-07 RX ADMIN — SODIUM CHLORIDE, PRESERVATIVE FREE 10 ML: 5 INJECTION INTRAVENOUS at 20:58

## 2021-10-07 RX ADMIN — ASPIRIN 325 MG ORAL TABLET 325 MG: 325 PILL ORAL at 14:04

## 2021-10-07 RX ADMIN — AMIODARONE HYDROCHLORIDE 150 MG: 1.5 INJECTION, SOLUTION INTRAVENOUS at 23:33

## 2021-10-07 ASSESSMENT — PAIN DESCRIPTION - LOCATION
LOCATION: CHEST
LOCATION: CHEST

## 2021-10-07 ASSESSMENT — ENCOUNTER SYMPTOMS
BACK PAIN: 0
CHOKING: 0
NAUSEA: 1
STRIDOR: 0
ABDOMINAL PAIN: 0
VOMITING: 0
NAUSEA: 0
COUGH: 1
DIARRHEA: 0
RHINORRHEA: 0
RHINORRHEA: 1
DIARRHEA: 1
WHEEZING: 0
SORE THROAT: 0
ABDOMINAL DISTENTION: 0
SHORTNESS OF BREATH: 1

## 2021-10-07 ASSESSMENT — PAIN SCALES - GENERAL
PAINLEVEL_OUTOF10: 4
PAINLEVEL_OUTOF10: 0
PAINLEVEL_OUTOF10: 2
PAINLEVEL_OUTOF10: 0
PAINLEVEL_OUTOF10: 0

## 2021-10-07 ASSESSMENT — PAIN DESCRIPTION - PAIN TYPE
TYPE: ACUTE PAIN
TYPE: ACUTE PAIN

## 2021-10-07 ASSESSMENT — HEART SCORE: ECG: 1

## 2021-10-07 NOTE — TELEPHONE ENCOUNTER
Received call from [facility] with Red Flag Complaint. call disconnected when transferred to triager. Called pt back once on number verified by ECC. No answer. Left message on unidentified VM it was physicians office to call back if needed. Attention Provider: Thank you for allowing me to participate in the care of your patient. The patient was connected to triage in response to information provided to the ECC/PSC. Please do not respond through this encounter as the response is not directed to a shared pool. Reason for Disposition   Message left on unidentified voice mail. Phone number verified.     Protocols used: NO CONTACT OR DUPLICATE CONTACT CALL-ADULT-[place]

## 2021-10-07 NOTE — PROGRESS NOTES
Medication History completed:    New medications: none    Medications discontinued: Spiriva, polyethylene glycol, nicotine gum, nicotine patches, loratadine, lidocaine cream, ibuprofen, hydrocortisone cream, guaifenesin, fluticasone nasal spray, cyclobenzaprine, atorvastatin    Changes to dosing: none    Stated allergies: NKDA    Other pertinent information: Medications confirmed with [facility]. The patient reports that she has been off prescription medications for about a year due to inability to schedule an appointment with her PCP.      Thank you,  Alvah Duane, PharmD, BCPS  [phone]

## 2021-10-07 NOTE — PROGRESS NOTES
Pt to floor from the ER at 1626. She is alert and oriented x 4. Mild chest pain.     Telemetry connected

## 2021-10-07 NOTE — ED PROVIDER NOTES
EMERGENCY DEPARTMENT ENCOUNTER    Pt Name: Deepak Alberto  MRN: [id number]  [place] 1965  Date of evaluation: 10/7/21  CHIEF COMPLAINT       Chief Complaint   Patient presents with    Chest Pain    Shortness of Breath    Palpitations     HISTORY OF PRESENT ILLNESS     Cough  Cough characteristics:  Non-productive  Severity:  Moderate  Onset quality:  Gradual  Duration:  2 weeks  Timing:  Constant  Progression:  Unchanged  Chronicity:  Recurrent  Smoker: yes    Relieved by:  Nothing  Worsened by:  Nothing  Ineffective treatments: completed course of steroids. Associated symptoms: chest pain, rhinorrhea and shortness of breath    Associated symptoms: no fever            REVIEW OF SYSTEMS     Review of Systems   Constitutional: Negative for fever. HENT: Positive for rhinorrhea. Respiratory: Positive for cough and shortness of breath. Cardiovascular: Positive for chest pain. Gastrointestinal: Positive for nausea. Negative for diarrhea and vomiting. All other systems reviewed and are negative. PASTMEDICAL HISTORY     Past Medical History:   Diagnosis Date    Chronic kidney disease     CKD (chronic kidney disease) stage 1, GFR 90 ml/min or greater     COPD (chronic obstructive pulmonary disease) (HCC)     Depression     Hyperlipidemia     Hypocalcemia     IBS (irritable bowel syndrome)     Proteinuria      Past Problem List  Patient Active Problem List   Diagnosis Code    IBS (irritable bowel syndrome) K58.9    Depression F32. A    Proteinuria R80.9    CKD (chronic kidney disease) stage 1, GFR 90 ml/min or greater N18.1    Mixed hyperlipidemia E78.2    Thyroid nodule E04.1    Chronic obstructive pulmonary disease (HCC) J44.9    Family history of colon cancer Z80.0    Personal history of smoking Z87.891    Calcification of right breast R92.1    Breast calcification, left R92.1    Incomplete rectal prolapse-Congenital defect.  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    Incontinence of feces R15.9     SURGICAL HISTORY       Past Surgical History:   Procedure Laterality Date     SECTION      CS x 2    COLONOSCOPY      TUBAL LIGATION Bilateral      CURRENT MEDICATIONS       Previous Medications    ALBUTEROL SULFATE HFA (VENTOLIN HFA) 108 (90 BASE) MCG/ACT INHALER    Inhale 2 puffs into the lungs every 6 hours as needed for Wheezing    ASPIRIN (ASPIRIN LOW DOSE) 81 MG EC TABLET    TAKE 1 TABLET BY MOUTH DAILY    ATORVASTATIN (LIPITOR) 40 MG TABLET    TAKE 1 TABLET BY MOUTH DAILY    BLOOD PRESSURE MONITOR KIT    TAKE BLOOD PRESSURE DAILY    CYCLOBENZAPRINE (FLEXERIL) 10 MG TABLET    TAKE 1 TABLET BY MOUTH TWICE DAILY AS NEEDED FOR MUSCLE SPASMS    ELASTIC BANDAGES & SUPPORTS (KNEE BRACE/HINGED/LARGE) MISC    Use daily for knee pain    FLUTICASONE (FLONASE) 50 MCG/ACT NASAL SPRAY    1 spray by Nasal route 2 times daily for 14 days    GUAIFENESIN (MUCINEX) 600 MG EXTENDED RELEASE TABLET    Take 1 tablet by mouth 2 times daily    HYDROCORTISONE 2.5 % CREAM    APPLY TO AFFECTED AREA BID    IBUPROFEN (ADVIL;MOTRIN) 800 MG TABLET    Take 1 tablet by mouth every 8 hours as needed for Pain    IBUPROFEN (ADVIL;MOTRIN) 800 MG TABLET    TAKE 1 TABLET BY MOUTH EVERY 6 HOURS AS NEEDED FOR PAIN    LIDOCAINE (LMX) 4 % CREAM    Apply topically every 8 hrs as needed for pain    LORATADINE (CLARITIN) 10 MG TABLET    Take 1 tablet by mouth daily    NICOTINE (NICODERM CQ) 14 MG/24HR    Place 1 patch onto the skin every 24 hours    NICOTINE POLACRILEX (NICORETTE) 2 MG GUM    Take 1 each by mouth as needed for Smoking cessation    POLYETHYLENE GLYCOL (GLYCOLAX) 17 GM/SCOOP POWDER    Take 17 g by mouth daily With 8 oz fluids    SPIRIVA RESPIMAT 1.25 MCG/ACT AERS INHALER    INHALE 2 PUFFS BY MOUTH INTO THE LUNGS ONCE DAILY     ALLERGIES     has No Known Allergies. FAMILY HISTORY     She indicated that her mother is . She indicated that her father is . SOCIAL HISTORY       Social History     Tobacco Use    Smoking status: Current Some Day Smoker     Packs/day: 1.00     Years: 28.00     Pack years: 28.00     Types: Cigarettes    Smokeless tobacco: Never Used    Tobacco comment: down to 5 cigs per day   Vaping Use    Vaping Use: Never used   Substance Use Topics    Alcohol use: No     Alcohol/week: 0.0 standard drinks    Drug use: No     PHYSICAL EXAM     INITIAL VITALS: BP (!) 142/64   Pulse 74   Temp 98.1 °F (36.7 °C) (Oral)   Resp 16   Ht 5' 5\" (1.651 m)   Wt 155 lb (70.3 kg)   LMP 2016   SpO2 95%   BMI 25.79 kg/m²    Physical Exam  Constitutional:       General: She is not in acute distress. Appearance: Normal appearance. She is well-developed. She is not diaphoretic. HENT:      Head: Normocephalic and atraumatic. Right Ear: External ear normal.      Left Ear: External ear normal.   Eyes:      General:         Right eye: No discharge. Left eye: No discharge. Conjunctiva/sclera: Conjunctivae normal.      Pupils: Pupils are equal, round, and reactive to light. Neck:      Trachea: No tracheal deviation. Cardiovascular:      Rate and Rhythm: Normal rate and regular rhythm. Pulses: Normal pulses. Heart sounds: Normal heart sounds. Pulmonary:      Effort: Pulmonary effort is normal. No respiratory distress. Breath sounds: Normal breath sounds. No stridor. No wheezing or rales. Abdominal:      Palpations: Abdomen is soft. Tenderness: There is no abdominal tenderness. There is no guarding or rebound. Musculoskeletal:         General: No tenderness or deformity. Normal range of motion.       Cervical back: Normal range of motion and neck supple. Skin:     General: Skin is warm and dry. Capillary Refill: Capillary refill takes less than 2 seconds. Findings: No erythema or rash. Neurological:      General: No focal deficit present. Mental Status: She is alert and oriented to person, place, and time. Cranial Nerves: No cranial nerve deficit. Coordination: Coordination normal.   Psychiatric:         Mood and Affect: Mood normal.         Behavior: Behavior normal.         Thought Content: Thought content normal.         Judgment: Judgment normal.         MEDICAL DECISION MAKING:       ED Course as of Oct 07 1405   Thu Oct 07, 2021   1345 HEART score 4  She admits to  palp and dyspnea and nausea  Will admit for stress testing  Giving ASA  Do not suspect PE    [WM]   1400 DW Dr Aleida Sebastian for admit  He agrees and so does the patient    [WM]   0 [facility] for admit    [WM]      ED Course User Index  [WM] Maren Bansal MD       Procedures    DIAGNOSTIC RESULTS   EKG:All EKG's are interpreted by the Emergency Department Physician who either signs or Co-signs this chart in the absence of a cardiologist.  NSR, nonspecific changes, no acute ischemic changes on ST segments, no change compared to old, normal rate and normal intervals        RADIOLOGY:All plain film, CT, MRI, and formal ultrasound images (except ED bedside ultrasound) are read by the radiologist, see reports below, unless otherwisenoted in MDM or here. XR CHEST PORTABLE   Final Result   No acute process. LABS: All lab results were reviewed by myself, and all abnormals are listed below.   Labs Reviewed   CBC WITH AUTO DIFFERENTIAL - Abnormal; Notable for the following components:       Result Value    RBC 5.54 (*)     Hemoglobin 16.4 (*)     Hematocrit 48.3 (*)     RDW 15.8 (*)     Platelets 452 (*)     All other components within normal limits   COMPREHENSIVE METABOLIC PANEL - Abnormal; Notable for the following components:    Glucose 102 (*)     Chloride 108 (*)     Alkaline Phosphatase 105 (*)     All other components within normal limits   COVID-19, RAPID   TROPONIN   TROPONIN   BRAIN NATRIURETIC PEPTIDE       EMERGENCY DEPARTMENTCOURSE:         Vitals:    Vitals:    10/07/21 1129 10/07/21 1347   BP: (!) 145/69 (!) 142/64   Pulse: 75 74   Resp: 17 16   Temp: 98.1 °F (36.7 °C)    TempSrc: Oral    SpO2: 97% 95%   Weight: 155 lb (70.3 kg)    Height: 5' 5\" (1.651 m)        The patient was given the following medications while in the emergency department:  Orders Placed This Encounter   Medications    aspirin tablet 325 mg     CONSULTS:  IP CONSULT TO INTERNAL MEDICINE    FINAL IMPRESSION      1. Chest pain, unspecified type          DISPOSITION/PLAN   DISPOSITION Decision To Admit 10/07/2021 01:51:57 PM      PATIENT REFERRED TO:  No follow-up provider specified.   DISCHARGE MEDICATIONS:  New Prescriptions    No medications on file     Radha Maddox MD  Attending Emergency Physician                    Radha Maddox MD  10/07/21 6197

## 2021-10-07 NOTE — ED TRIAGE NOTES
Mode of arrival (squad #, walk in, police, etc) : Walk in        Chief complaint(s): Chest pain, palpitations, shortness of breath        Arrival Note (brief scenario, treatment PTA, etc). : Pt arrives to ED c/o chest pain that she states started this morning. Patient states that she felt like her heart has been racing since yesterday. Patient also states that she has been feeling short of breath since this morning as well. C= \"Have you ever felt that you should Cut down on your drinking? \"  No  A= \"Have people Annoyed you by criticizing your drinking? \"  No  G= \"Have you ever felt bad or Guilty about your drinking? \"  No  E= \"Have you ever had a drink as an Eye-opener first thing in the morning to steady your nerves or to help a hangover? \"  No      Deferred []      Reason for deferring: N/A    *If yes to two or more: probable alcohol abuse. *

## 2021-10-07 NOTE — TELEPHONE ENCOUNTER
[facility] with Red Flag Complaint. Brief description of triage: Patient reports racing heart, is unable to to count heart rate, states heart rate return to normal with rest.    Triage indicates for patient to seen in office. Patient advised that if no appointment for today is available to be seen at an [address] [place] [address]. Patient verbalized understanding. Care advice provided, patient verbalizes understanding; denies any other questions or concerns; instructed to call back for any new or worsening symptoms. Attempted to transfer patient to [place] with [facility]. [place] stated that there are no appointments available for today at patient's practice. Attempted to get the patient back on the phone but she had disconnected. Voicemail left for patient asking her to call back if she needs further assistance. Attention Provider: Thank you for allowing me to participate in the care of your patient. The patient was connected to triage in response to information provided to the ECC/PSC. Please do not respond through this encounter as the response is not directed to a shared pool. Reason for Disposition   Patient wants to be seen    Answer Assessment - Initial Assessment Questions  1. DESCRIPTION: \"Please describe your heart rate or heart beat that you are having\" (e.g., fast/slow, regular/irregular, skipped or extra beats, \"palpitations\")     Heart speeds up with activity, slows back down at rest.    2. ONSET: \"When did it start? \" (Minutes, hours or days)       3 days ago    3. DURATION: \"How long does it last\" (e.g., seconds, minutes, hours)   5 minutes    4. PATTERN \"Does it come and go, or has it been constant since it started? \"  \"Does it get worse with exertion? \"   \"Are you feeling it now? \"      Comes and goes    5. TAP: \"Using your hand, can you tap out what you are feeling on a chair or table in front of you, so that I can hear? \" (Note: not all patients can do this) Unable    6. HEART RATE: \"Can you tell me your heart rate? \" \"How many beats in 15 seconds? \"  (Note: not all patients can do this)        Unable    7. RECURRENT SYMPTOM: \"Have you ever had this before? \" If so, ask: \"When was the last time? \" and \"What happened that time? \"      On prednisone for COPD    8. CAUSE: \"What do you think is causing the palpitations? \"      Possibly from prednisone    9. CARDIAC HISTORY: \"Do you have any history of heart disease? \" (e.g., heart attack, angina, bypass surgery, angioplasty, arrhythmia)    Denies    10. OTHER SYMPTOMS: \"Do you have any other symptoms? \" (e.g., dizziness, chest pain, sweating, difficulty breathing)        SOB, is able to speak in complete sentences    11. PREGNANCY: \"Is there any chance you are pregnant? \" \"When was your last menstrual period? \"    LMP \"years ago\"    Protocols used: HEART RATE AND HEARTBEAT QUESTIONS-ADULT-[place]
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
## 2021-10-08 ENCOUNTER — APPOINTMENT (OUTPATIENT)
Dept: NON INVASIVE DIAGNOSTICS | Age: 56
DRG: 201 | End: 2021-10-08
Payer: MEDICARE

## 2021-10-08 ENCOUNTER — APPOINTMENT (OUTPATIENT)
Dept: NUCLEAR MEDICINE | Age: 56
DRG: 201 | End: 2021-10-08
Payer: MEDICARE

## 2021-10-08 PROBLEM — I48.91 ATRIAL FIBRILLATION WITH RVR (HCC): Status: ACTIVE | Noted: 2021-10-08

## 2021-10-08 LAB
ANION GAP SERPL CALCULATED.3IONS-SCNC: 11 MMOL/L (ref 9–17)
ANTI-XA UNFRAC HEPARIN: 0.38 IU/L (ref 0.3–0.7)
ANTI-XA UNFRAC HEPARIN: 0.54 IU/L (ref 0.3–0.7)
BNP INTERPRETATION: ABNORMAL
BUN BLDV-MCNC: 9 MG/DL (ref 6–20)
BUN/CREAT BLD: ABNORMAL (ref 9–20)
CALCIUM SERPL-MCNC: 9.4 MG/DL (ref 8.6–10.4)
CHLORIDE BLD-SCNC: 107 MMOL/L (ref 98–107)
CO2: 23 MMOL/L (ref 20–31)
CREAT SERPL-MCNC: 0.54 MG/DL (ref 0.5–0.9)
D-DIMER QUANTITATIVE: 0.71 MG/L FEU (ref 0–0.59)
GFR AFRICAN AMERICAN: >60 ML/MIN
GFR NON-AFRICAN AMERICAN: >60 ML/MIN
GFR SERPL CREATININE-BSD FRML MDRD: ABNORMAL ML/MIN/{1.73_M2}
GFR SERPL CREATININE-BSD FRML MDRD: ABNORMAL ML/MIN/{1.73_M2}
GLUCOSE BLD-MCNC: 198 MG/DL (ref 70–99)
HCT VFR BLD CALC: 52.3 % (ref 36–46)
HEMOGLOBIN: 17.7 G/DL (ref 12–16)
INR BLD: 0.9
LV EF: 63 %
LVEF MODALITY: NORMAL
MAGNESIUM: 2.2 MG/DL (ref 1.6–2.6)
MCH RBC QN AUTO: 29.7 PG (ref 26–34)
MCHC RBC AUTO-ENTMCNC: 33.9 G/DL (ref 31–37)
MCV RBC AUTO: 87.6 FL (ref 80–100)
NRBC AUTOMATED: ABNORMAL PER 100 WBC
PARTIAL THROMBOPLASTIN TIME: 34 SEC (ref 24–36)
PDW BLD-RTO: 15.8 % (ref 11.5–14.9)
PLATELET # BLD: 559 K/UL (ref 150–450)
PMV BLD AUTO: 7.7 FL (ref 6–12)
POTASSIUM SERPL-SCNC: 4.4 MMOL/L (ref 3.7–5.3)
PRO-BNP: 539 PG/ML
PROTHROMBIN TIME: 12.5 SEC (ref 11.8–14.6)
RBC # BLD: 5.97 M/UL (ref 4–5.2)
SODIUM BLD-SCNC: 141 MMOL/L (ref 135–144)
TROPONIN INTERP: NORMAL
TROPONIN T: NORMAL NG/ML
TROPONIN, HIGH SENSITIVITY: <6 NG/L (ref 0–14)
WBC # BLD: 10.7 K/UL (ref 3.5–11)

## 2021-10-08 PROCEDURE — 99233 SBSQ HOSP IP/OBS HIGH 50: CPT | Performed by: INTERNAL MEDICINE

## 2021-10-08 PROCEDURE — 94761 N-INVAS EAR/PLS OXIMETRY MLT: CPT

## 2021-10-08 PROCEDURE — 85379 FIBRIN DEGRADATION QUANT: CPT

## 2021-10-08 PROCEDURE — 97116 GAIT TRAINING THERAPY: CPT

## 2021-10-08 PROCEDURE — 6360000002 HC RX W HCPCS: Performed by: STUDENT IN AN ORGANIZED HEALTH CARE EDUCATION/TRAINING PROGRAM

## 2021-10-08 PROCEDURE — 85520 HEPARIN ASSAY: CPT

## 2021-10-08 PROCEDURE — 78452 HT MUSCLE IMAGE SPECT MULT: CPT

## 2021-10-08 PROCEDURE — 93005 ELECTROCARDIOGRAM TRACING: CPT

## 2021-10-08 PROCEDURE — 2580000003 HC RX 258: Performed by: STUDENT IN AN ORGANIZED HEALTH CARE EDUCATION/TRAINING PROGRAM

## 2021-10-08 PROCEDURE — 83735 ASSAY OF MAGNESIUM: CPT

## 2021-10-08 PROCEDURE — 97166 OT EVAL MOD COMPLEX 45 MIN: CPT

## 2021-10-08 PROCEDURE — 93306 TTE W/DOPPLER COMPLETE: CPT

## 2021-10-08 PROCEDURE — 85610 PROTHROMBIN TIME: CPT

## 2021-10-08 PROCEDURE — 2500000003 HC RX 250 WO HCPCS: Performed by: NURSE PRACTITIONER

## 2021-10-08 PROCEDURE — 85730 THROMBOPLASTIN TIME PARTIAL: CPT

## 2021-10-08 PROCEDURE — 2060000000 HC ICU INTERMEDIATE R&B

## 2021-10-08 PROCEDURE — 80048 BASIC METABOLIC PNL TOTAL CA: CPT

## 2021-10-08 PROCEDURE — 3430000000 HC RX DIAGNOSTIC RADIOPHARMACEUTICAL: Performed by: STUDENT IN AN ORGANIZED HEALTH CARE EDUCATION/TRAINING PROGRAM

## 2021-10-08 PROCEDURE — 2500000003 HC RX 250 WO HCPCS: Performed by: INTERNAL MEDICINE

## 2021-10-08 PROCEDURE — 97162 PT EVAL MOD COMPLEX 30 MIN: CPT

## 2021-10-08 PROCEDURE — 6370000000 HC RX 637 (ALT 250 FOR IP)

## 2021-10-08 PROCEDURE — 6370000000 HC RX 637 (ALT 250 FOR IP): Performed by: NURSE PRACTITIONER

## 2021-10-08 PROCEDURE — 94640 AIRWAY INHALATION TREATMENT: CPT

## 2021-10-08 PROCEDURE — 83880 ASSAY OF NATRIURETIC PEPTIDE: CPT

## 2021-10-08 PROCEDURE — 2580000003 HC RX 258

## 2021-10-08 PROCEDURE — 97530 THERAPEUTIC ACTIVITIES: CPT

## 2021-10-08 PROCEDURE — 85027 COMPLETE CBC AUTOMATED: CPT

## 2021-10-08 PROCEDURE — 36415 COLL VENOUS BLD VENIPUNCTURE: CPT

## 2021-10-08 PROCEDURE — A9500 TC99M SESTAMIBI: HCPCS | Performed by: STUDENT IN AN ORGANIZED HEALTH CARE EDUCATION/TRAINING PROGRAM

## 2021-10-08 RX ORDER — HEPARIN SODIUM 10000 [USP'U]/100ML
12 INJECTION, SOLUTION INTRAVENOUS CONTINUOUS
Status: DISCONTINUED | OUTPATIENT
Start: 2021-10-08 | End: 2021-10-09

## 2021-10-08 RX ORDER — HEPARIN SODIUM 1000 [USP'U]/ML
4000 INJECTION, SOLUTION INTRAVENOUS; SUBCUTANEOUS PRN
Status: DISCONTINUED | OUTPATIENT
Start: 2021-10-08 | End: 2021-10-09

## 2021-10-08 RX ORDER — HEPARIN SODIUM 1000 [USP'U]/ML
4000 INJECTION, SOLUTION INTRAVENOUS; SUBCUTANEOUS ONCE
Status: COMPLETED | OUTPATIENT
Start: 2021-10-08 | End: 2021-10-08

## 2021-10-08 RX ORDER — SODIUM CHLORIDE 0.9 % (FLUSH) 0.9 %
10 SYRINGE (ML) INJECTION PRN
Status: DISCONTINUED | OUTPATIENT
Start: 2021-10-08 | End: 2021-10-09 | Stop reason: HOSPADM

## 2021-10-08 RX ORDER — HEPARIN SODIUM 1000 [USP'U]/ML
2000 INJECTION, SOLUTION INTRAVENOUS; SUBCUTANEOUS PRN
Status: DISCONTINUED | OUTPATIENT
Start: 2021-10-08 | End: 2021-10-09

## 2021-10-08 RX ORDER — METOPROLOL SUCCINATE 25 MG/1
25 TABLET, EXTENDED RELEASE ORAL DAILY
Status: DISCONTINUED | OUTPATIENT
Start: 2021-10-08 | End: 2021-10-09 | Stop reason: HOSPADM

## 2021-10-08 RX ADMIN — FLUTICASONE PROPIONATE 1 SPRAY: 50 SPRAY, METERED NASAL at 09:08

## 2021-10-08 RX ADMIN — AMIODARONE HYDROCHLORIDE 0.5 MG/MIN: 1.8 INJECTION, SOLUTION INTRAVENOUS at 23:13

## 2021-10-08 RX ADMIN — FAMOTIDINE 20 MG: 20 TABLET ORAL at 20:39

## 2021-10-08 RX ADMIN — METHYLPREDNISOLONE SODIUM SUCCINATE 60 MG: 125 INJECTION, POWDER, FOR SOLUTION INTRAMUSCULAR; INTRAVENOUS at 09:08

## 2021-10-08 RX ADMIN — ATORVASTATIN CALCIUM 40 MG: 40 TABLET, FILM COATED ORAL at 09:08

## 2021-10-08 RX ADMIN — HEPARIN SODIUM 12 UNITS/KG/HR: 10000 INJECTION, SOLUTION INTRAVENOUS at 11:15

## 2021-10-08 RX ADMIN — FAMOTIDINE 20 MG: 20 TABLET ORAL at 09:08

## 2021-10-08 RX ADMIN — GUAIFENESIN 600 MG: 600 TABLET, EXTENDED RELEASE ORAL at 20:40

## 2021-10-08 RX ADMIN — SODIUM CHLORIDE, PRESERVATIVE FREE 10 ML: 5 INJECTION INTRAVENOUS at 20:40

## 2021-10-08 RX ADMIN — AMIODARONE HYDROCHLORIDE 1 MG/MIN: 1.8 INJECTION, SOLUTION INTRAVENOUS at 00:03

## 2021-10-08 RX ADMIN — FLUTICASONE PROPIONATE 1 SPRAY: 50 SPRAY, METERED NASAL at 20:33

## 2021-10-08 RX ADMIN — GUAIFENESIN 600 MG: 600 TABLET, EXTENDED RELEASE ORAL at 09:08

## 2021-10-08 RX ADMIN — SODIUM CHLORIDE, PRESERVATIVE FREE 10 ML: 5 INJECTION INTRAVENOUS at 08:09

## 2021-10-08 RX ADMIN — SODIUM CHLORIDE, PRESERVATIVE FREE 10 ML: 5 INJECTION INTRAVENOUS at 11:17

## 2021-10-08 RX ADMIN — ASPIRIN 81 MG: 81 TABLET, COATED ORAL at 09:08

## 2021-10-08 RX ADMIN — SODIUM CHLORIDE, PRESERVATIVE FREE 10 ML: 5 INJECTION INTRAVENOUS at 09:08

## 2021-10-08 RX ADMIN — AMIODARONE HYDROCHLORIDE 1 MG/MIN: 1.8 INJECTION, SOLUTION INTRAVENOUS at 06:23

## 2021-10-08 RX ADMIN — TETRAKIS(2-METHOXYISOBUTYLISOCYANIDE)COPPER(I) TETRAFLUOROBORATE 10.8 MILLICURIE: 1 INJECTION, POWDER, LYOPHILIZED, FOR SOLUTION INTRAVENOUS at 08:09

## 2021-10-08 RX ADMIN — HEPARIN SODIUM 4000 UNITS: 1000 INJECTION INTRAVENOUS; SUBCUTANEOUS at 11:14

## 2021-10-08 RX ADMIN — METOPROLOL SUCCINATE 25 MG: 25 TABLET, EXTENDED RELEASE ORAL at 12:57

## 2021-10-08 RX ADMIN — TIOTROPIUM BROMIDE INHALATION SPRAY 2 PUFF: 3.12 SPRAY, METERED RESPIRATORY (INHALATION) at 07:28

## 2021-10-08 ASSESSMENT — PAIN DESCRIPTION - DESCRIPTORS
DESCRIPTORS: ACHING

## 2021-10-08 ASSESSMENT — PAIN DESCRIPTION - LOCATION
LOCATION: CHEST

## 2021-10-08 ASSESSMENT — PAIN SCALES - GENERAL
PAINLEVEL_OUTOF10: 0
PAINLEVEL_OUTOF10: 3
PAINLEVEL_OUTOF10: 2
PAINLEVEL_OUTOF10: 0

## 2021-10-08 ASSESSMENT — ENCOUNTER SYMPTOMS
CHOKING: 0
SHORTNESS OF BREATH: 1
WHEEZING: 0
STRIDOR: 0
NAUSEA: 0
VOMITING: 0
SORE THROAT: 0
COUGH: 1
RHINORRHEA: 0
ABDOMINAL DISTENTION: 0
BACK PAIN: 0
ABDOMINAL PAIN: 0
DIARRHEA: 1

## 2021-10-08 ASSESSMENT — PAIN DESCRIPTION - ORIENTATION
ORIENTATION: ANTERIOR
ORIENTATION: ANTERIOR

## 2021-10-08 ASSESSMENT — PAIN - FUNCTIONAL ASSESSMENT: PAIN_FUNCTIONAL_ASSESSMENT: ACTIVITIES ARE NOT PREVENTED

## 2021-10-08 ASSESSMENT — PAIN DESCRIPTION - ONSET: ONSET: UNABLE TO TELL

## 2021-10-08 ASSESSMENT — PAIN DESCRIPTION - PAIN TYPE
TYPE: ACUTE PAIN

## 2021-10-08 ASSESSMENT — PAIN DESCRIPTION - FREQUENCY
FREQUENCY: CONTINUOUS
FREQUENCY: CONTINUOUS

## 2021-10-08 ASSESSMENT — PAIN DESCRIPTION - PROGRESSION: CLINICAL_PROGRESSION: NOT CHANGED

## 2021-10-08 NOTE — PROGRESS NOTES
Call from Dr. Van Jimenez to cancel stress test d/t pt. In a-fib rvr. Notified nuc. Med. That she is not having this done today and they had already injected for resting pictures.

## 2021-10-08 NOTE — CONSULTS
History:   has a past medical history of Chronic kidney disease, CKD (chronic kidney disease) stage 1, GFR 90 ml/min or greater, COPD (chronic obstructive pulmonary disease) ([address] Depression, Hyperlipidemia, Hypocalcemia, IBS (irritable bowel syndrome), and Proteinuria. Past Surgical History:   has a past surgical history that includes  section; Tubal ligation (Bilateral); and Colonoscopy. Home Medications:    Prior to Admission medications    Medication Sig Start Date End Date Taking? Authorizing Provider   aspirin (ASPIRIN LOW DOSE) 81 MG EC tablet TAKE 1 TABLET BY MOUTH DAILY 10/26/20  Yes Bernardino Sinha MD   albuterol sulfate HFA (VENTOLIN HFA) 108 (90 Base) MCG/ACT inhaler Inhale 2 puffs into the lungs every 6 hours as needed for Wheezing 20  Yes Bernardino Sinha MD   Elastic Bandages & Supports (KNEE BRACE/HINGED/LARGE) MISC Use daily for knee pain 19   Bernardino Sinha MD   Blood Pressure Monitor KIT TAKE BLOOD PRESSURE DAILY 16   Radha Rizvi MD       Allergies:  Patient has no known allergies. Social History:   reports that she has been smoking cigarettes. She has a 28.00 pack-year smoking history. She has never used smokeless tobacco. She reports that she does not drink alcohol and does not use drugs. Family History: family history includes Asthma in her father; COPD in her father; Cancer in her father; Diabetes in her father; Heart Failure in her father; Seizures in her mother. No h/o sudden cardiac death. No for premature CAD    REVIEW OF SYSTEMS:    · Constitutional: there has been no unanticipated weight loss. There's been No change in energy level, No change in activity level. · Eyes: No visual changes or diplopia. No scleral icterus. · ENT: No Headaches, hearing loss or vertigo. No mouth sores or sore throat. · Cardiovascular: see above  · Respiratory: see above  · Gastrointestinal: No abdominal pain, appetite loss, blood in stools.    · Genitourinary: No No results for input(s): BNP in the last 72 hours. PT/INR:   Recent Labs     10/08/21  0959   PROTIME 12.5   INR 0.9     APTT:  Recent Labs     10/08/21  0959   APTT 34.0     CARDIAC ENZYMES:No results for input(s): CKTOTAL, CKMB, CKMBINDEX, TROPONINI in the last 72 hours. FASTING LIPID PANEL:  Lab Results   Component Value Date    HDL 39 01/24/2019    TRIG 99 01/24/2019     LIVER PROFILE:  Recent Labs     10/07/21  1220   AST 13   ALT 16   LABALBU 4.3       IMPRESSION:    Patient Active Problem List   Diagnosis    IBS (irritable bowel syndrome)    Depression    Proteinuria    CKD (chronic kidney disease) stage 1, GFR 90 ml/min or greater    Mixed hyperlipidemia    Thyroid nodule    Chronic obstructive pulmonary disease ([facility]    Family history of colon cancer    Personal history of smoking    Calcification of right breast    Breast calcification, left    Incomplete rectal prolapse-Congenital defect. Surgery at 5 yo    Prediabetes    Acute pain of left knee    Intramural leiomyoma of uterus    PMB (postmenopausal bleeding)    Adrenal adenoma    Endometrial thickening on ultrasound    Adrenal nodule (HCC)    Pain of left lower extremity    Instability of left knee joint    Imperforate anus    Incontinence of feces    Chest pain    Atrial fibrillation with RVR (HCC)     1. Chest pain and palpitations  2. New onset Afib RVR - converted to SR on IV Amiodarone drip  3. COPD  4.  Tobacco abuse    [id number] Score for Atrial Fibrillation Stroke Risk   Risk   Factors  Component Value   C CHF No 0   H HTN No 0   A2 Age >= 76 No,  (55 y.o.) 0   D DM No 0   S2 Prior Stroke/TIA No 0   V Vascular Disease No 0   A Age 74-69 No,  (55 y.o.) 0   Sc Sex female 1    [id number]  Score  1   Score last updated 10/8/21 55:20 AM EDT    Click here for a link to the UpToDate guideline \"Atrial Fibrillation: Anticoagulation therapy to prevent embolization    Disclaimer: Risk Score calculation is dependent on accuracy of patient problem list and past encounter diagnosis. RECOMMENDATIONS:  1. Stable. Converted to SR on IV Amiodarone gtt. Will continue drip through today/tonight and if remains SR will switch to PO tomorrow. Will get echo today. If preserved LVEF will proceed with stress test tomorrow. If significantly reduced LVEF (<35%) will consider holding off on stress and planning for cardiac cath on Monday. Discussed in detail with patient. Questions/concerns addressed. 2. Continue IV Heparin gtt. Currently has a low CHADSVASC score but will further consider NOAC pending LVEF findings  3. Continue PO ASA & statin. Will add low dose Toprol XL. 4. Keep K >4 and Mg >2      Discussed with patient and Nurse.     Judge Paul, APRN - [facility] Cardiology Consult           [phone]
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
## 2021-10-08 NOTE — PROGRESS NOTES
[facility]    Occupational Therapy Evaluation  Date: 10/8/21  Patient Name: Buddy Phipps       Room:   MRN: [id number]  Account: [de-identified]   : 1965  (49 y.o.) Gender: female     Discharge Recommendations: The patient's needs are being met with no further Occupational Therapy recommended at discharge. Equipment Needed:  (TBD)    Referring Practitioner: Manda Harada, MD  Diagnosis: Chest pain       Treatment Diagnosis: Impaired self care status  Past Medical History:  has a past medical history of Chronic kidney disease, CKD (chronic kidney disease) stage 1, GFR 90 ml/min or greater, COPD (chronic obstructive pulmonary disease) ([address] Depression, Hyperlipidemia, Hypocalcemia, IBS (irritable bowel syndrome), and Proteinuria. Past Surgical History:   has a past surgical history that includes  section; Tubal ligation (Bilateral); and Colonoscopy.     Restrictions  Restrictions/Precautions: General Precautions, Fall Risk, Up as Tolerated  Implants present? : (P)  (pt denies)  Required Braces or Orthoses  Left Lower Extremity Brace: Knee Brace  Required Braces or Orthoses?: Yes     Vitals  Temp: 98 °F (36.7 °C)  Pulse: 73  Resp: 24  BP: 123/61  Height: 5' 5\" (165.1 cm)  Weight: 151 lb 7.3 oz (68.7 kg)  BMI (Calculated): 25.3  Oxygen Therapy  SpO2: (P) 93 %  Pulse Oximeter Device Mode: (P) Continuous  Pulse Oximeter Device Location: (P) Finger  O2 Device: (P) None (Room air)  Blood Gas  Performed?: No  Level of Consciousness: Alert (0)    Subjective  Subjective: Pt resting in bed upon arrival. Pt was pleasant and agreeable to OT/PT eval  Comments: Ok per Fabiano Puentes for OT/PT eval  Overall Orientation Status: Within Functional Limits  Vision  Vision: Impaired  Vision Exceptions:  (Needs glasses for reading)  Hearing  Hearing: Within functional limits  Social/Functional History  Lives With: Family, Friend(s) (2 adult children 32 y.o. and 23 y.o., 1 roommate)  Type of Home: House  Home Layout: One level  Home Access: Stairs to enter with rails  Entrance Stairs - Number of Steps: 3 steps onto porch, 1 step into house  Entrance Stairs - Rails: Both (cannot reach both rails at home)  Bathroom Shower/Tub: Tub/Shower unit, Curtain, Shower chair with back  Bathroom Toilet: Standard  Bathroom Equipment: Shower chair  Bathroom Accessibility: Not accessible  Home Equipment:  (no DME)  Receives Help From: Family  ADL Assistance: [address]: Needs assistance (Chores are shared by family; pt does grocery shopping)  Homemaking Responsibilities: Yes  Ambulation Assistance: Independent  Transfer Assistance: Independent  Active : No  Mode of Transportation: Family, Friends  Occupation: Full time employment  Type of occupation: 7/11 [address] [place] [address] [place]: 1 dog, 10 indoor cats, 5 outdoor cats  IADL Comments: pt sleeps on couch most of time  Additional Comments: 1 son is home 24/7 and able to assist pt as needed. Pain Assessment  Pain Assessment: 0-10  Pain Level: 2  Pain Type: Acute pain  Pain Location: Chest    Objective          Sensation  Overall Sensation Status: WFL (pt denies N/T)   ADL  Feeding: Independent  Grooming: Setup  UE Bathing: Setup  LE Bathing: Contact guard assistance  UE Dressing: Setup  LE Dressing: Contact guard assistance  Toileting: Contact guard assistance  Additional Comments: ADL scores based on clinical reasoning and skilled observation unless otherwise noted. Pt able to doff/don bilateral socks while sitting in chair utilizing figure 4 technique. Pt currently limited due to decreased strength, balance, and activity tolerance impacting safety and independence with self care tasks.      UE Function           LUE Strength  Gross LUE Strength: WFL  L Hand General: 4/5     LUE Tone: Normotonic     LUE AROM (degrees)  LUE AROM : WFL     Left Hand AROM (degrees)  Left Hand AROM: WFL  RUE Strength  Gross RUE Strength: WFL  R Hand General: 4/5      RUE Tone: Normotonic     RUE AROM (degrees)  RUE AROM : WFL     Right Hand AROM (degrees)  Right Hand AROM: WFL    Fine Motor Skills  Coordination  Movements Are Fluid And Coordinated: Yes                           Mobility  Supine to Sit: Modified independent  Sit to Supine: Modified independent       Balance  Sitting Balance: Modified independent   Standing Balance: Contact guard assistance  Standing Balance  Time: 2-3 minutes  Activity: Functional mobility  Comment: without device  Functional Mobility  Functional - Mobility Device: No device  Activity: Other (hallway)  Assist Level: Contact guard assistance  Functional Mobility Comments: slight unsteadiness noted with functional mobility  Bed mobility  Rolling to Left: Modified independent  Rolling to Right: Modified independent  Supine to Sit: Modified independent  Sit to Supine: Modified independent  Scooting: Modified independent  Comment: Bed mobility completed with bed flat     Transfers  Sit to stand: Contact guard assistance  Stand to sit: Contact guard assistance  Functional Activity Tolerance  Functional Activity Tolerance:  Tolerates 30 min exercise with multiple rests     Assessment  Assessment  Performance deficits / Impairments: Decreased ADL status, Decreased functional mobility , Decreased strength, Decreased safe awareness, Decreased endurance, Decreased balance, Decreased high-level IADLs  Treatment Diagnosis: Impaired self care status  Prognosis: Good  Decision Making: Medium Complexity  REQUIRES OT FOLLOW UP: Yes  Activity Tolerance: Patient Tolerated treatment well         Functional Outcome Measures  AM-PAC Daily Activity Inpatient   How much help for putting on and taking off regular lower body clothing?: A Little  How much help for Bathing?: A Little  How much help for Toileting?: A Little  How much help for putting on and taking off regular upper body clothing?: A Little  How much help for taking care of personal grooming?: A

## 2021-10-08 NOTE — PROGRESS NOTES
Physical Therapy        Physical Therapy Cancel Note      DATE: 10/8/2021    NAME: Cee Groves  MRN: [id number]   : 1965      Patient not seen this date for Physical Therapy due to : 10/08/21 930 Needs Eval: pt currently unavailable this AM, pt Nurse Rin Hermosillo pt is receiving EKG and heparin treatment to follow.  PT to check back this PM      Electronically signed by Barnabas Dakins, PT on 10/8/2021 at 9:44 AM

## 2021-10-08 NOTE — PROGRESS NOTES
Patient tolerating PO intake at this time. Writer speaks with Danis Lopez CNP regarding patient running tachycardic on monitor, new orders received for new consult to cardiology and transfer to Progressive unit.

## 2021-10-08 NOTE — PROGRESS NOTES
Physical Therapy    Facility/Department: [facility] ICU  Initial Assessment    NAME: Veronica Hudson  : 1965  MRN: [id number]    Date of Service: 10/8/2021    Discharge Recommendations:  Patient would benefit from continued therapy after discharge   PT Equipment Recommendations  Equipment Needed:  (TBD; Assess benefit of cane to improve pain and gait pattern)    Assessment   Body structures, Functions, Activity limitations: Decreased functional mobility ; Decreased strength;Decreased balance; Increased pain  Assessment: pt performs Bed mobility Mod I and requires CGA for transfers, gait 75'x2, and stairs were not assessed this date. Pt would benefit from continued skilled therapy to improve deficits in BLE strength, balance, pain, and deviated gait to faciliate improved overall functional mobility and to progress pt towards prior level of independence  Treatment Diagnosis: Impaired functional mobility secondary to Left knee pain and Chest pain  Specific instructions for Next Treatment: Assess gait mobility with Cane (Left knee pain), Assess stair mobility (pt has 4 steps to enter home)  Prognosis: Good  Decision Making: Medium Complexity  History: H/O COPD, HLD, CKD  Exam: ROM, MMT, Balance, and mobility assessments  Clinical Presentation: pt is alert, cooperative, and pleasant throughout session. Pt's Left knee pain appears to be limiting functional mobility the most   REQUIRES PT FOLLOW UP: Yes  Activity Tolerance  Activity Tolerance: Patient Tolerated treatment well;Patient limited by pain  Activity Tolerance: Pt overall tolerates therapy well but Left knee pain and weakness noted. pt limited by musculoskeletal issues with L knee       Patient Diagnosis(es): The encounter diagnosis was Chest pain, unspecified type.      has a past medical history of Chronic kidney disease, CKD (chronic kidney disease) stage 1, GFR 90 ml/min or greater, COPD (chronic obstructive pulmonary disease) ([facility] [address] Depression, Hyperlipidemia, Hypocalcemia, IBS (irritable bowel syndrome), and Proteinuria. has a past surgical history that includes  section; Tubal ligation (Bilateral); and Colonoscopy. Restrictions  Restrictions/Precautions  Restrictions/Precautions: General Precautions, Fall Risk, Up as Tolerated  Required Braces or Orthoses?: Yes  Implants present? :  (pt denies)  Required Braces or Orthoses  Left Lower Extremity Brace: Knee Brace  Vision/Hearing  Vision: Impaired  Vision Exceptions:  (Needs glasses for reading)  Hearing: Within functional limits     Subjective  General  Chart Reviewed: Yes  Patient assessed for rehabilitation services?: Yes  Response To Previous Treatment: Not applicable  Family / Caregiver Present: No  Referring Practitioner: Dr. Yoko August  Referral Date : 10/07/21  Diagnosis: Chest pain   Follows Commands: Within Functional Limits  General Comment  Comments: ok to proceed with PT eval per nurse Talya Tamayo  Subjective  Subjective: pt is pleasant and agreeable to therapy assessments.   Pain Screening  Patient Currently in Pain: Yes  Pain Assessment  Pain Assessment: 0-10  Pain Level: 2  Patient's Stated Pain Goal: No pain  Pain Type: Acute pain  Pain Location: Chest  Pain Orientation: Anterior  Pain Descriptors: Aching  Pain Frequency: Continuous  Vital Signs  Patient Currently in Pain: Yes  Oxygen Therapy  SpO2: 93 %  Pulse Oximeter Device Mode: Continuous  Pulse Oximeter Device Location: Finger  O2 Device: None (Room air)  Patient Observation  Observations: Pt resting supine in bed with HOB elevated upon arrival. Peripheral IVs R and L antecubital, Telemetry/BP/SpO2 monitoring continuous       Orientation     Social/Functional History  Social/Functional History  Lives With: Family, Friend(s) (2 adult children 32 y.o. and 23 y.o., 1 roommate)  Type of Home: House  Home Layout: One level  Home Access: Stairs to enter with rails  Entrance Stairs - Number of Steps: 3 steps onto porch, 1 step into house  Entrance Stairs - Rails: Both (cannot reach both rails at home)  Bathroom Shower/Tub: Tub/Shower unit, Curtain, Shower chair with back  Bathroom Toilet: Standard  Bathroom Equipment: Shower chair  Bathroom Accessibility: Not accessible  Home Equipment:  (no DME)  Receives Help From: Family  ADL Assistance: [address]: Needs assistance (Chores are shared by family; pt does grocery shopping)  Homemaking Responsibilities: Yes  Ambulation Assistance: Independent  Transfer Assistance: Independent  Active : No  Mode of Transportation: Family, Friends  Occupation: Full time employment  Type of occupation: 7/11 [address]: 1 dog, 10 indoor cats, 5 outdoor cats  IADL Comments: pt sleeps on couch most of time  Additional Comments: 1 son is home 24/7 and able to assist pt as needed. Cognition        Objective          AROM RLE (degrees)  RLE AROM: WFL  AROM LLE (degrees)  LLE AROM : WFL  AROM RUE (degrees)  RUE General AROM: See OT  AROM LUE (degrees)  LUE General AROM: See OT  Strength RLE  Comment: Grossly 4+/5  Strength LLE  Comment: Grossly 4+/5; Except Hip flexion and knee are 4/5  Strength RUE  Comment: See OT  Strength LUE  Comment: See OT     Sensation  Overall Sensation Status: WFL (pt denies N/T)  Bed mobility  Rolling to Left: Modified independent  Rolling to Right: Modified independent  Supine to Sit: Modified independent  Sit to Supine: Unable to assess  Scooting: Modified independent  Comment: pt performs bed mobility without difficulty on flattened bed without use of bed rails.  sit-to-supine not assessed d/t pt being seated up in chair at end of session  Transfers  Sit to Stand: Contact guard assistance  Stand to sit: Contact guard assistance  Bed to Chair: Contact guard assistance  Stand Pivot Transfers: Contact guard assistance  Comment: All transfers performed without AD, pt performs without difficulty and CGA for safety and line/IV pole management  Ambulation  Ambulation?: Yes  Ambulation 1  Surface: level tile  Device: No Device  Other Apparatus:  (IV Pole managed by this writer)  Assistance: Contact guard assistance  Quality of Gait: antalgic gait, with decreased stance time on LLE d/t knee pain  Gait Deviations: Slow Vashti;Decreased step length  Distance: 75'x2, 15'  Comments: pt ambulates steadily in room and halls without device. CGA for safety and IV pole management. Pt hobbles with antalgia present d/t L knee pain and difficulty. Stairs/Curb  Stairs?: No     Balance  Posture: Fair  Sitting - Static: Good  Sitting - Dynamic: Good  Standing - Static: Fair  Standing - Dynamic: Fair  Comments: Standing balance assessed without AD. Pt hobbles with antalgic gait inc her risk for falls. Pt states that at times her left knee will buckle        Plan   Plan  Times per week: 3-4 treatments per week  Specific instructions for Next Treatment: Assess gait mobility with Cane (Left knee pain), Assess stair mobility (pt has 4 steps to enter home)  Current Treatment Recommendations: Strengthening, Balance Training, Functional Mobility Training, Gait Training, Stair training, Pain Management  Safety Devices  Type of devices:  All fall risk precautions in place, Call light within reach, Gait belt, Patient at risk for falls, Left in chair, Nurse notified (Nurse Eric Pyle)  Restraints  Initially in place: No    G-Code       OutComes Score    -PAC Score  -PAC Inpatient Mobility Raw Score : 19 (10/08/21 1559)  AM-PAC Inpatient T-Scale Score : 45.44 (10/08/21 1559)  Mobility Inpatient CMS 0-100% Score: 41.77 (10/08/21 1559)  Mobility Inpatient CMS G-Code Modifier : CK (10/08/21 1559)          Goals  Short term goals  Time Frame for Short term goals: until d/c  Short term goal 1: pt to demo All transfers independently   Short term goal 2: pt to improve BLE by 1/2 manual muscle grade to improve safety with functional mobility   Short term goal 3: pt to negotiate 4 stairs with single hand rail Mod I to allow for safe home access  Short term goal 4: pt to ambulate 150' Mod I with appropriate AD if needed  Short term goal 5: continue to assess gait mobility and trial use of cane to alleviate L knee pain and to normalize gait pattern.    Short term goal 6: pt to demo improved standing balance to fair + to improve safety with functional mobility   Patient Goals   Patient goals : to return to home and work safely       Therapy Time   Individual Concurrent Group Co-treatment   Time In 1332         Time Out 1358         Minutes 26         Timed Code Treatment Minutes: Oma Dominguez 8, PT

## 2021-10-08 NOTE — CARE COORDINATION
CASE MANAGEMENT NOTE:    Admission Date:  10/7/2021 Giuliana Prasad is a 49 y.o.  female    Admitted for : Chest pain [R07.9]  Chest pain, unspecified type [R07.9]  Atrial fibrillation with RVR ([address] [I48.91]    Met with:  Patient    PCP:  Rosie Franklin                                Insurance:  [employer]      Is patient alert and oriented at time of discussion:  Yes    Current Residence/ Living Arrangements:  independently at home w/ Son/Daughter & Roommate            Current Services PTA:  No    Does patient go to outpatient dialysis: No  If yes, location and chair time: NA    Is patient agreeable to VNS: No    Freedom of choice provided:  No    List of [address] provided: No    VNS chosen:  No, Independent, denies the need    DME:  shower chair, If needed    Home Oxygen: No    Nebulizer: No    CPAP/BIPAP: No    Supplier: N/A    Potential Assistance Needed: Yes, Follow for Possible POAC    SNF needed: No    Freedom of choice and list provided: NA    Pharmacy:  [facility] [address]       Does Patient want to use MEDS to BEDS? No    Is patient currently receiving oral anticoagulation therapy? No    Is the Patient an [facility] with Readmission Risk Score greater than 14%? No  If yes, pt needs a follow up appointment made within 7 days. Family Members/Caregivers that pt would like involved in their care:    Yes    If yes, list name here:  Brad Miller    Transportation Provider:  Patient, Has no car, may need ride home             Discharge Plan:  10/8/21 [employer] Pt. Lives in 1 story home w/ Son/Daughter, & Roommate. DME, SC if needed. Heparin/Amiodarone GTT's, Stress test CXL, IV steroids, 60MG, daily. Cardio, PT/OT, orange header 10%, Denies VNS, Follow for [facility], [facility] or ride home. //KB                  Electronically signed by: Jocelyn Dubon RN on 10/8/2021 at 11:02 AM

## 2021-10-08 NOTE — PROGRESS NOTES
We were informed by Concepcion Stanley that the patient was having A. fib with RVR in the stress lab. We canceled the stress test today. Will update cardio in rounds.    Ordering new EKG, Mg

## 2021-10-08 NOTE — PROGRESS NOTES
Writer speaks with Dr. Cortney Calvin who is covering for Dr. Grace Ramirez regarding patients sustained vtach and EKG showing Afib with RVR. New orders received, he will see patient in a.m.

## 2021-10-08 NOTE — PLAN OF CARE
Problem: Pain:  Goal: Pain level will decrease  Description: Pain level will decrease  Outcome: Ongoing  Note: No c/o  chest pain in afternoon. Remains on heparin gtt and amiodarone gtt as ordered. Problem: Safety:  Goal: Free from accidental physical injury  Description: Free from accidental physical injury  Outcome: Ongoing  Note: Up with assist due to iv lines and monitor lines. Problem: Daily Care:  Goal: Daily care needs are met  Description: Daily care needs are met  Outcome: Ongoing  Note: Up to bathroom for hygeine. Problem: Discharge Planning:  Goal: Patients continuum of care needs are met  Description: Patients continuum of care needs are met  Outcome: Ongoing  Note: Pt able to make all needs known.

## 2021-10-08 NOTE — PLAN OF CARE
Problem: Pain:  Goal: Pain level will decrease  Description: Pain level will decrease  Outcome: Ongoing  Note: Pt started on gtt to help control heart rate and help with chest pain     Problem: Safety:  Goal: Free from intentional harm  Description: Free from intentional harm  Outcome: Ongoing  Note: Pt free from intentional harm     Problem: Skin Integrity:  Goal: Skin integrity will stabilize  Description: Skin integrity will stabilize  Outcome: Ongoing  Note: Pts skin integrity stable

## 2021-10-09 ENCOUNTER — APPOINTMENT (OUTPATIENT)
Dept: GENERAL RADIOLOGY | Age: 56
DRG: 201 | End: 2021-10-09
Payer: MEDICARE

## 2021-10-09 ENCOUNTER — APPOINTMENT (OUTPATIENT)
Dept: CT IMAGING | Age: 56
DRG: 201 | End: 2021-10-09
Payer: MEDICARE

## 2021-10-09 VITALS
TEMPERATURE: 97.7 F | SYSTOLIC BLOOD PRESSURE: 109 MMHG | HEART RATE: 73 BPM | BODY MASS INDEX: 25.23 KG/M2 | WEIGHT: 151.46 LBS | HEIGHT: 65 IN | OXYGEN SATURATION: 92 % | DIASTOLIC BLOOD PRESSURE: 58 MMHG | RESPIRATION RATE: 22 BRPM

## 2021-10-09 LAB
ANION GAP SERPL CALCULATED.3IONS-SCNC: 8 MMOL/L (ref 9–17)
BILIRUBIN URINE: NEGATIVE
BUN BLDV-MCNC: 10 MG/DL (ref 6–20)
BUN/CREAT BLD: ABNORMAL (ref 9–20)
CALCIUM SERPL-MCNC: 8.7 MG/DL (ref 8.6–10.4)
CHLORIDE BLD-SCNC: 110 MMOL/L (ref 98–107)
CO2: 23 MMOL/L (ref 20–31)
COLOR: YELLOW
COMMENT UA: ABNORMAL
CREAT SERPL-MCNC: 0.52 MG/DL (ref 0.5–0.9)
EKG ATRIAL RATE: 138 BPM
EKG ATRIAL RATE: 70 BPM
EKG ATRIAL RATE: 71 BPM
EKG P AXIS: 59 DEGREES
EKG P AXIS: 74 DEGREES
EKG P-R INTERVAL: 160 MS
EKG P-R INTERVAL: 170 MS
EKG Q-T INTERVAL: 274 MS
EKG Q-T INTERVAL: 400 MS
EKG Q-T INTERVAL: 406 MS
EKG QRS DURATION: 78 MS
EKG QRS DURATION: 80 MS
EKG QRS DURATION: 82 MS
EKG QTC CALCULATION (BAZETT): 400 MS
EKG QTC CALCULATION (BAZETT): 434 MS
EKG QTC CALCULATION (BAZETT): 438 MS
EKG R AXIS: -10 DEGREES
EKG R AXIS: 39 DEGREES
EKG R AXIS: 9 DEGREES
EKG T AXIS: 37 DEGREES
EKG T AXIS: 39 DEGREES
EKG T AXIS: 55 DEGREES
EKG VENTRICULAR RATE: 128 BPM
EKG VENTRICULAR RATE: 70 BPM
EKG VENTRICULAR RATE: 71 BPM
GFR AFRICAN AMERICAN: >60 ML/MIN
GFR NON-AFRICAN AMERICAN: >60 ML/MIN
GFR SERPL CREATININE-BSD FRML MDRD: ABNORMAL ML/MIN/{1.73_M2}
GFR SERPL CREATININE-BSD FRML MDRD: ABNORMAL ML/MIN/{1.73_M2}
GLUCOSE BLD-MCNC: 122 MG/DL (ref 70–99)
GLUCOSE URINE: NEGATIVE
HCT VFR BLD CALC: 43.7 % (ref 36–46)
HEMOGLOBIN: 14.5 G/DL (ref 12–16)
KETONES, URINE: NEGATIVE
LEUKOCYTE ESTERASE, URINE: NEGATIVE
LV EF: 64 %
LVEF MODALITY: NORMAL
MCH RBC QN AUTO: 29.1 PG (ref 26–34)
MCHC RBC AUTO-ENTMCNC: 33.3 G/DL (ref 31–37)
MCV RBC AUTO: 87.5 FL (ref 80–100)
NITRITE, URINE: NEGATIVE
NRBC AUTOMATED: ABNORMAL PER 100 WBC
PDW BLD-RTO: 15.9 % (ref 11.5–14.9)
PH UA: 6.5 (ref 5–8)
PLATELET # BLD: 506 K/UL (ref 150–450)
PMV BLD AUTO: 7.6 FL (ref 6–12)
POTASSIUM SERPL-SCNC: 4.1 MMOL/L (ref 3.7–5.3)
PROCALCITONIN: 0.05 NG/ML
PROTEIN UA: NEGATIVE
RBC # BLD: 4.99 M/UL (ref 4–5.2)
SODIUM BLD-SCNC: 141 MMOL/L (ref 135–144)
SPECIFIC GRAVITY UA: 1.04 (ref 1–1.03)
TURBIDITY: CLEAR
URINE HGB: NEGATIVE
UROBILINOGEN, URINE: NORMAL
WBC # BLD: 13.9 K/UL (ref 3.5–11)

## 2021-10-09 PROCEDURE — 93010 ELECTROCARDIOGRAM REPORT: CPT | Performed by: INTERNAL MEDICINE

## 2021-10-09 PROCEDURE — 36415 COLL VENOUS BLD VENIPUNCTURE: CPT

## 2021-10-09 PROCEDURE — 6360000002 HC RX W HCPCS: Performed by: NURSE PRACTITIONER

## 2021-10-09 PROCEDURE — 6370000000 HC RX 637 (ALT 250 FOR IP)

## 2021-10-09 PROCEDURE — 6360000004 HC RX CONTRAST MEDICATION: Performed by: STUDENT IN AN ORGANIZED HEALTH CARE EDUCATION/TRAINING PROGRAM

## 2021-10-09 PROCEDURE — 80048 BASIC METABOLIC PNL TOTAL CA: CPT

## 2021-10-09 PROCEDURE — 81003 URINALYSIS AUTO W/O SCOPE: CPT

## 2021-10-09 PROCEDURE — 99239 HOSP IP/OBS DSCHRG MGMT >30: CPT | Performed by: INTERNAL MEDICINE

## 2021-10-09 PROCEDURE — 6370000000 HC RX 637 (ALT 250 FOR IP): Performed by: INTERNAL MEDICINE

## 2021-10-09 PROCEDURE — 71045 X-RAY EXAM CHEST 1 VIEW: CPT

## 2021-10-09 PROCEDURE — 3430000000 HC RX DIAGNOSTIC RADIOPHARMACEUTICAL: Performed by: INTERNAL MEDICINE

## 2021-10-09 PROCEDURE — 71260 CT THORAX DX C+: CPT

## 2021-10-09 PROCEDURE — 85027 COMPLETE CBC AUTOMATED: CPT

## 2021-10-09 PROCEDURE — 2580000003 HC RX 258: Performed by: STUDENT IN AN ORGANIZED HEALTH CARE EDUCATION/TRAINING PROGRAM

## 2021-10-09 PROCEDURE — 2580000003 HC RX 258: Performed by: NURSE PRACTITIONER

## 2021-10-09 PROCEDURE — A9500 TC99M SESTAMIBI: HCPCS | Performed by: INTERNAL MEDICINE

## 2021-10-09 PROCEDURE — 6370000000 HC RX 637 (ALT 250 FOR IP): Performed by: NURSE PRACTITIONER

## 2021-10-09 PROCEDURE — 94640 AIRWAY INHALATION TREATMENT: CPT

## 2021-10-09 PROCEDURE — 94761 N-INVAS EAR/PLS OXIMETRY MLT: CPT

## 2021-10-09 PROCEDURE — 93017 CV STRESS TEST TRACING ONLY: CPT

## 2021-10-09 PROCEDURE — 84145 PROCALCITONIN (PCT): CPT

## 2021-10-09 RX ORDER — AMIODARONE HYDROCHLORIDE 200 MG/1
200 TABLET ORAL 2 TIMES DAILY
Status: DISCONTINUED | OUTPATIENT
Start: 2021-10-09 | End: 2021-10-09 | Stop reason: HOSPADM

## 2021-10-09 RX ORDER — NITROGLYCERIN 0.4 MG/1
0.4 TABLET SUBLINGUAL EVERY 5 MIN PRN
Qty: 25 TABLET | Refills: 3 | Status: SHIPPED | OUTPATIENT
Start: 2021-10-09 | End: 2022-03-22 | Stop reason: SDUPTHER

## 2021-10-09 RX ORDER — METOPROLOL SUCCINATE 25 MG/1
25 TABLET, EXTENDED RELEASE ORAL DAILY
Qty: 30 TABLET | Refills: 3 | Status: SHIPPED | OUTPATIENT
Start: 2021-10-09 | End: 2022-01-26 | Stop reason: SDUPTHER

## 2021-10-09 RX ORDER — ALBUTEROL SULFATE 90 UG/1
2 AEROSOL, METERED RESPIRATORY (INHALATION) PRN
Status: ACTIVE | OUTPATIENT
Start: 2021-10-09 | End: 2021-10-09

## 2021-10-09 RX ORDER — TIOTROPIUM BROMIDE INHALATION SPRAY 1.56 UG/1
SPRAY, METERED RESPIRATORY (INHALATION)
Qty: 4 G | Refills: 2 | Status: SHIPPED | OUTPATIENT
Start: 2021-10-09 | End: 2022-01-26 | Stop reason: SDUPTHER

## 2021-10-09 RX ORDER — SODIUM CHLORIDE 9 MG/ML
500 INJECTION, SOLUTION INTRAVENOUS CONTINUOUS PRN
Status: ACTIVE | OUTPATIENT
Start: 2021-10-09 | End: 2021-10-09

## 2021-10-09 RX ORDER — AMINOPHYLLINE DIHYDRATE 25 MG/ML
50 INJECTION, SOLUTION INTRAVENOUS PRN
Status: ACTIVE | OUTPATIENT
Start: 2021-10-09 | End: 2021-10-09

## 2021-10-09 RX ORDER — 0.9 % SODIUM CHLORIDE 0.9 %
80 INTRAVENOUS SOLUTION INTRAVENOUS ONCE
Status: COMPLETED | OUTPATIENT
Start: 2021-10-09 | End: 2021-10-09

## 2021-10-09 RX ORDER — AMIODARONE HYDROCHLORIDE 200 MG/1
200 TABLET ORAL 2 TIMES DAILY
Qty: 90 TABLET | Refills: 5 | Status: SHIPPED | OUTPATIENT
Start: 2021-10-09 | End: 2022-01-26 | Stop reason: SDUPTHER

## 2021-10-09 RX ORDER — SODIUM CHLORIDE 0.9 % (FLUSH) 0.9 %
5-40 SYRINGE (ML) INJECTION PRN
Status: ACTIVE | OUTPATIENT
Start: 2021-10-09 | End: 2021-10-09

## 2021-10-09 RX ORDER — FAMOTIDINE 20 MG/1
20 TABLET, FILM COATED ORAL 2 TIMES DAILY
Qty: 60 TABLET | Refills: 3 | Status: SHIPPED | OUTPATIENT
Start: 2021-10-09 | End: 2022-02-28

## 2021-10-09 RX ORDER — SODIUM CHLORIDE 0.9 % (FLUSH) 0.9 %
10 SYRINGE (ML) INJECTION PRN
Status: DISCONTINUED | OUTPATIENT
Start: 2021-10-09 | End: 2021-10-09 | Stop reason: HOSPADM

## 2021-10-09 RX ADMIN — TETRAKIS(2-METHOXYISOBUTYLISOCYANIDE)COPPER(I) TETRAFLUOROBORATE 37.3 MILLICURIE: 1 INJECTION, POWDER, LYOPHILIZED, FOR SOLUTION INTRAVENOUS at 10:02

## 2021-10-09 RX ADMIN — ASPIRIN 81 MG: 81 TABLET, COATED ORAL at 11:50

## 2021-10-09 RX ADMIN — FAMOTIDINE 20 MG: 20 TABLET ORAL at 11:50

## 2021-10-09 RX ADMIN — IOPAMIDOL 75 ML: 755 INJECTION, SOLUTION INTRAVENOUS at 11:10

## 2021-10-09 RX ADMIN — GUAIFENESIN 600 MG: 600 TABLET, EXTENDED RELEASE ORAL at 11:51

## 2021-10-09 RX ADMIN — SODIUM CHLORIDE 80 ML: 9 INJECTION, SOLUTION INTRAVENOUS at 11:10

## 2021-10-09 RX ADMIN — SODIUM CHLORIDE, PRESERVATIVE FREE 10 ML: 5 INJECTION INTRAVENOUS at 09:20

## 2021-10-09 RX ADMIN — ATORVASTATIN CALCIUM 40 MG: 40 TABLET, FILM COATED ORAL at 11:51

## 2021-10-09 RX ADMIN — FLUTICASONE PROPIONATE 1 SPRAY: 50 SPRAY, METERED NASAL at 11:52

## 2021-10-09 RX ADMIN — SODIUM CHLORIDE, PRESERVATIVE FREE 10 ML: 5 INJECTION INTRAVENOUS at 11:10

## 2021-10-09 RX ADMIN — AMIODARONE HYDROCHLORIDE 200 MG: 200 TABLET ORAL at 14:45

## 2021-10-09 RX ADMIN — APIXABAN 5 MG: 5 TABLET, FILM COATED ORAL at 12:27

## 2021-10-09 RX ADMIN — REGADENOSON 0.4 MG: 0.08 INJECTION, SOLUTION INTRAVENOUS at 10:04

## 2021-10-09 RX ADMIN — TIOTROPIUM BROMIDE INHALATION SPRAY 2 PUFF: 3.12 SPRAY, METERED RESPIRATORY (INHALATION) at 08:35

## 2021-10-09 RX ADMIN — METOPROLOL SUCCINATE 25 MG: 25 TABLET, EXTENDED RELEASE ORAL at 11:50

## 2021-10-09 ASSESSMENT — ENCOUNTER SYMPTOMS
RHINORRHEA: 0
NAUSEA: 0
SHORTNESS OF BREATH: 1
ABDOMINAL DISTENTION: 0
VOMITING: 0
COUGH: 0
CHOKING: 0
DIARRHEA: 0
STRIDOR: 0
WHEEZING: 0
BACK PAIN: 0
ABDOMINAL PAIN: 0
SORE THROAT: 0

## 2021-10-09 ASSESSMENT — PAIN SCALES - GENERAL: PAINLEVEL_OUTOF10: 0

## 2021-10-09 NOTE — PROGRESS NOTES
CST Lexiscan. Stress Tech performs patient preparation of physical comfort, review test procedures, pre-stress EKG. Lung Sounds clear t/o. Consent verified by pt. .  Educated patient on test procedure and possible side effects of Lexiscan as well as s/s to report. Cardiologist reviewed pre-test EKG and is present for test.  Patient tolerated test well with minor SOB, which resolved to baseline after test with caffeine. Start /58 HR 51  Stop /66 HR 81  EKG portion of testing is completed and negative, nuc. med. portion is still pending.
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
## 2021-10-09 NOTE — PROGRESS NOTES
[facility]        Date:   10/9/2021  Patient name: Buffy Pollard  Date of admission:  10/7/2021 11:25 AM  MRN:   [id number]  YOB: 1965  PCP: Gracy Pyle MD    Reason for Consult:       Subjective: No new cardiac issues. No overnight events. Chart reviewed. Physical Exam:   Vitals: /66   Pulse 50   Temp 98.1 °F (36.7 °C) (Oral)   Resp 17   Ht 5' 5\" (1.651 m)   Wt 151 lb 7.3 oz (68.7 kg)   LMP 04/16/2016   SpO2 94%   BMI 25.20 kg/m²   General appearance: alert and cooperative with exam  HEENT: Head: Normocephalic, no lesions, without obvious abnormality. Neck: no adenopathy, no carotid bruit, no JVD, supple, symmetrical, trachea midline and thyroid not enlarged, symmetric, no tenderness/mass/nodules  Lungs: clear to auscultation bilaterally  Heart: regular rate and rhythm, S1, S2 normal, no murmur, click, rub or gallop  Abdomen: soft, non-tender; bowel sounds normal; no masses,  no organomegaly  Extremities: extremities normal, atraumatic, no cyanosis or edema  Neurologic: Mental status: Alert, oriented, thought content appropriate      Lab work, imaging, nursing, and chart reviewed extensively.     Medications:   Scheduled Meds:   sodium chloride  80 mL IntraVENous Once    metoprolol succinate  25 mg Oral Daily    aspirin  81 mg Oral Daily    atorvastatin  40 mg Oral Daily    tiotropium  2 puff Inhalation Daily    guaiFENesin  600 mg Oral BID    fluticasone  1 spray Nasal BID    nicotine  1 patch TransDERmal Q24H    sodium chloride flush  5-40 mL IntraVENous 2 times per day    famotidine  20 mg Oral BID    morphine  4 mg IntraVENous Once     Continuous Infusions:   sodium chloride      heparin (PORCINE) Infusion 12 Units/kg/hr (10/08/21 1115)    sodium chloride      sodium chloride 75 mL/hr at 10/07/21 1807    Amiodarone 0.5 mg/min (10/08/21 2313)         Labs:     CBC:   Recent Labs     10/07/21  1220 10/08/21  0458 10/09/21  0716   WBC 10.5 10.7 13.9*   HGB 16.4* 17.7* 14.5   * 559* 506*     BMP:    Recent Labs     10/07/21  1220 10/08/21  0458 10/09/21  0716    141 141   K 4.1 4.4 4.1   * 107 110*   CO2 25 23 23   BUN 12 9 10   CREATININE 0.55 0.54 0.52   GLUCOSE 102* 198* 122*     Hepatic:   Recent Labs     10/07/21  1220   AST 13   ALT 16   BILITOT 0.31   ALKPHOS 105*     Troponin: No results for input(s): TROPONINI in the last 72 hours. BNP: No results for input(s): BNP in the last 72 hours. Lipids: No results for input(s): CHOL, HDL in the last 72 hours. Invalid input(s): LDLCALCU  INR:   Recent Labs     10/08/21  0959   INR 0.9       Other active acute problems:  Patient Active Problem List   Diagnosis    IBS (irritable bowel syndrome)    Depression    Proteinuria    CKD (chronic kidney disease) stage 1, GFR 90 ml/min or greater    Mixed hyperlipidemia    Thyroid nodule    Chronic obstructive pulmonary disease ([address]    Family history of colon cancer    Personal history of smoking    Calcification of right breast    Breast calcification, left    Incomplete rectal prolapse-Congenital defect. Surgery at 5 yo    Prediabetes    Acute pain of left knee    Intramural leiomyoma of uterus    PMB (postmenopausal bleeding)    Adrenal adenoma    Endometrial thickening on ultrasound    Adrenal nodule (HCC)    Pain of left lower extremity    Instability of left knee joint    Imperforate anus    Incontinence of feces    Chest pain    Atrial fibrillation with RVR ([address]     Echo: 10/7/21  Left ventricle is normal in size and wall thickness. Global left ventricular systolic function is normal. Estimated LV EF 60-65  %. No obvious wall motion abnormality seen. Both atria are normal in size. Normal right ventricular size and function. No significant valvular regurgitation or stenosis seen. No significant pericardial effusion is seen. Normal aortic root dimension.   IVC normal diameter & inspiratory collapse indicating normal RA filling  pressure . IMPRESSION & Recommendations:      1. Chest pain and palpitations- from Afib. Resolved in NSR. Having stress today. 2. New onset Afib RVR - converted to SR on IV Amiodarone drip. Change to PO. Resume drip after testing, ok to hold for testing. 3. Start Eliquis after CT scan. If no PE, can stop heparin drip and take Eliquis 5mg BID. If has PE, can stop heparin and take Eliquis 10mg BID for 7 days, then 5mg BID. 4. COPD  5. Tobacco abuse  6. Can DC home on BB, amio, Eliquis  7. Can follow up in clinic in 1-2 weeks after discharge      Discussed with patient, family, and Nurse. Electronically signed by Ofelia Hardy DO on 10/9/2021 at 8:58 AM    Ofelia Hardy, [id number] [facility]  [facility]  [phone]

## 2021-10-09 NOTE — DISCHARGE INSTR - COC
Continuity of Care Form    Patient Name: Tony Hubbard   :  1965  MRN:  [id number]    Admit date:  10/7/2021  Discharge date:  ***    Code Status Order: Full Code   Advance Directives:     Admitting Physician:  Tiffanie Wheeler MD  PCP: Slime Gardner MD    Discharging Nurse: [facility] Unit/Room#:   Discharging Unit Phone Number: ***    Emergency Contact:   Extended Emergency Contact Information  Primary Emergency Contact: [address] Phone: [phone]  Relation: Step Child  [facility]: [phone]  Relation: Child    Past Surgical History:  Past Surgical History:   Procedure Laterality Date     SECTION      CS x 2    COLONOSCOPY      TUBAL LIGATION Bilateral        Immunization History:   Immunization History   Administered Date(s) Administered    Pneumococcal Polysaccharide (Vypypsxhg92) 2018    Tdap (Boostrix, Adacel) 10/14/2017       Active Problems:  Patient Active Problem List   Diagnosis Code    IBS (irritable bowel syndrome) K58.9    Depression F32. A    Proteinuria R80.9    CKD (chronic kidney disease) stage 1, GFR 90 ml/min or greater N18.1    Mixed hyperlipidemia E78.2    Thyroid nodule E04.1    Chronic obstructive pulmonary disease (HCC) J44.9    Family history of colon cancer Z80.0    Personal history of smoking Z87.891    Calcification of right breast R92.1    Breast calcification, left R92.1    Incomplete rectal prolapse-Congenital defect.  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    Incontinence of feces R15.9    Chest pain R07.9    Atrial fibrillation with RVR ([facility]) I48.91       Isolation/Infection:   Isolation          No Isolation        Patient Infection Status     Infection Onset Added Last Indicated Last Indicated By Review Planned Expiration Resolved Resolved By    None active    Resolved    COVID-19 Rule Out 10/07/21 10/07/21 10/07/21 COVID-19, Rapid (Ordered)   10/07/21 Rule-Out Test Resulted          Nurse Assessment:  Last Vital Signs: BP (!) 109/58   Pulse 73   Temp 97.7 °F (36.5 °C)   Resp 22   Ht 5' 5\" (1.651 m)   Wt 151 lb 7.3 oz (68.7 kg)   LMP 04/16/2016   SpO2 92%   BMI 25.20 kg/m²     Last documented pain score (0-10 scale): Pain Level: 0  Last Weight:   Wt Readings from Last 1 Encounters:   10/07/21 151 lb 7.3 oz (68.7 kg)     Mental Status:  {IP PT MENTAL STATUS:25427}    IV Access:  { KAELA IV ACCESS:995927279}    Nursing Mobility/ADLs:  Walking   {[facility] DME CDHT:588358804}  Transfer  {[facility] DME PSAS:872119454}  Bathing  {[facility] DME PKHA:387290879}  Dressing  {[facility] DME DBDO:458752801}  Toileting  {[facility] DME UGWE:895783989}  Feeding  {[facility] DME YAKU:902424236}  Med Admin  {[facility] DME ZZFM:083542651}  Med Delivery   { KAELA MED Delivery:165256011}    Wound Care Documentation and Therapy:        Elimination:  Continence:   · Bowel: {YES / GO:31003}  · Bladder: {YES / SF:31359}  Urinary Catheter: {Urinary Catheter:722067881}   Colostomy/Ileostomy/Ileal Conduit: {YES / ZA:48476}       Date of Last BM: ***    Intake/Output Summary (Last 24 hours) at 10/9/2021 1705  Last data filed at 10/9/2021 1214  Gross per 24 hour   Intake 600 ml   Output --   Net 600 ml     I/O last 3 completed shifts:   In: 12 [P.O.:960]  Out: -     Safety Concerns:     [address] Concerns:827867649}    Impairments/Disabilities:      [address] Impairments/Disabilities:070218518}    Nutrition Therapy:  Current Nutrition Therapy:   [address] Diet List:678095438}    Routes of Feeding: {CHP DME Other Feedings:332183761}  Liquids: {Slp liquid thickness:41819}  Daily Fluid Restriction: {CHP DME Yes amt example:302987259}  Last Modified Barium Swallow with Video (Video Swallowing Test): {Done Not Done HSEH:617310915}    Treatments at the Time of Hospital Discharge:   Respiratory Treatments: ***  Oxygen Therapy:  {Therapy; copd oxygen:07031}  Ventilator:    {[facility] Vent HOEN:117907109}    Rehab Therapies: {THERAPEUTIC INTERVENTION:7165202889}  Weight Bearing Status/Restrictions: {[facility] Weight Bearin}  Other Medical Equipment (for information only, NOT a DME order):  {EQUIPMENT:905979383}  Other Treatments: ***    Patient's personal belongings (please select all that are sent with patient):  {CHP DME Belongings:864625898}    RN SIGNATURE:  {Esignature:115909363}    CASE MANAGEMENT/SOCIAL WORK SECTION    Inpatient Status Date: ***    Readmission Risk Assessment Score:  Readmission Risk              Risk of Unplanned Readmission:  11           Discharging to Facility/ Agency   · Name:   · Address:  · Phone:  · Fax:    Dialysis Facility (if applicable)   · Name:  · Address:  · Dialysis Schedule:  · Phone:  · Fax:    / signature: {Esignature:411458392}    PHYSICIAN SECTION    Prognosis: {Prognosis:5597890564}    Condition at Discharge: [facility] Patient Condition:462045533}    Rehab Potential (if transferring to Rehab): {Prognosis:0880485902}    Recommended Labs or Other Treatments After Discharge: ***    Physician Certification: I certify the above information and transfer of Sherry Platt  is necessary for the continuing treatment of the diagnosis listed and that she requires {Admit to Appropriate Level of Care:90795} for {GREATER/LESS:161379232} 30 days.      Update Admission H&P: {CHP DME Changes in DQBRQ:575987790}    PHYSICIAN SIGNATURE:  {Esignature:710417306}
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
## 2021-10-09 NOTE — PROGRESS NOTES
Patient seen and examined with family medicine resident team  Admitted with chest pain  Chest pain is improved  Complaining of upper epigastric discomfort, will start on Protonix  Echo done which is negative  Had episode of A. fib  On Eliquis  Plan for CT chest today, will change the dose of Eliquis if CT chest is positive  We are waiting for the second part of the stress test  If negative, likely discharge home

## 2021-10-09 NOTE — PLAN OF CARE
Problem: Safety:  Goal: Free from accidental physical injury  Description: Free from accidental physical injury  10/9/2021 0200 by Bethanie Charles RN  Outcome: Ongoing  10/8/2021 1727 by Nancy Dickson RN  Outcome: Ongoing  Note: Up with assist due to iv lines and monitor lines. Problem: Daily Care:  Goal: Daily care needs are met  Description: Daily care needs are met  10/9/2021 0200 by Bethanie Charles RN  Outcome: Ongoing  10/8/2021 1727 by Nancy Dickson RN  Outcome: Ongoing  Note: Up to bathroom for hygeine.      Problem: Skin Integrity:  Goal: Skin integrity will stabilize  Description: Skin integrity will stabilize  Outcome: Ongoing

## 2021-10-09 NOTE — PROCEDURES
[address] [address]                              CARDIAC STRESS TEST    PATIENT NAME: Cal Jones                   :        1965  MED REC NO:   [id number]                              ROOM:       2015  ACCOUNT NO:   [de-identified]                           ADMIT DATE: 10/07/2021  PROVIDER:     Graciela Kohler    DATE OF STUDY:  10/09/2021    TEST TYPE: LEXISCAN CARDIOLYTE STRESS TEST  INDICATION: CHEST PAIN, ABNORMAL EKG  REFERRING PHYSICIAN: NATE CHURCH    RESTING HEART RATE: 51 BEATS PER MINUTE  RESTING BLOOD PRESSURE: 122/58    MEDICATION(S) GIVEN: 0.4 MG IV LEXISCAN  REASON FOR TERMINATION: MEDICATION INFUSION COMPLETE    RESTING EKG: NORMAL  COMMENTS: SINUS BRADYCARDIA, HEART RATE 51 BEATS PER MINUTE  STRESS HEART RESPONSE: NORMAL RESPONSE  BLOOD PRESSURE RESPONSE: APPROPRIATE  STRESS EKGs: HEART RATE INCREASE TO 93 BEATS PER MINUTE  CHEST DISCOMFORT: NO PAIN  ISCHEMIC EKG CHANGES: NONE    EKG IMPRESSION: ELECTROCARDIOGRAPHICALLY NEGATIVE LEXISCAN STRESS TEST. RADIOISOTOPE RESULTS TO FOLLOW FROM THE DEPARTMENT OF NUCLEAR MEDICINE.             Andrei Cline    D: 10/09/2021 10:59:57       T: 10/09/2021 11:02:32     [id number]  Job#: [id number]     Doc#: Unknown    CC:    (Retain this field even if not dictated or not decipherable)

## 2021-10-09 NOTE — CARE COORDINATION
ONGOING DISCHARGE PLAN:    Patient is alert and oriented x4. Spoke with patient regarding discharge plan and patient confirms that plan is still to discharge to home with no needs   Patient had a stress test done   eliquis   Po amio     Will continue to follow for additional discharge needs.     Electronically signed by Ana Díaz RN on 10/9/2021 at 11:29 AM

## 2021-10-11 ENCOUNTER — TELEPHONE (OUTPATIENT)
Dept: FAMILY MEDICINE CLINIC | Age: 56
End: 2021-10-11

## 2021-10-11 DIAGNOSIS — Z87.891 PERSONAL HISTORY OF TOBACCO USE: Primary | ICD-10-CM

## 2021-10-11 RX ORDER — NICOTINE 21 MG/24HR
1 PATCH, TRANSDERMAL 24 HOURS TRANSDERMAL DAILY
Qty: 42 PATCH | Refills: 0 | Status: SHIPPED | OUTPATIENT
Start: 2021-10-11 | End: 2022-03-22 | Stop reason: SDUPTHER

## 2021-10-11 NOTE — TELEPHONE ENCOUNTER
[facility] Initial Follow Up Call    Outreach made within 2 business days of discharge: Yes    Patient: Mark Epperson Patient : 1965   MRN: [id number]  Reason for Admission: There are no discharge diagnoses documented for the most recent discharge. Discharge Date: 10/9/21       Spoke with: JONATHAN    Discharge department/facility: [address] [place] [address] Interactive Patient Contact:  Was patient able to fill all prescriptions: Yes  Was patient instructed to bring all medications to the follow-up visit: Yes  Is patient taking all medications as directed in the discharge summary? Yes  Does patient understand their discharge instructions: Yes  Does patient have questions or concerns that need addressed prior to 7-14 day follow up office visit: yes -  PATIENT STATED SHE IS TO STOP SMOKING ASAP AND WOULD LIKE TO KNOW IF NICOTINE PATCHES CAN BE SENT TO [address] [place] [address]     Scheduled appointment with PCP within 7-14 days    Follow Up  No future appointments.     Ivonne Martinez MA

## 2022-01-20 ENCOUNTER — TELEPHONE (OUTPATIENT)
Dept: FAMILY MEDICINE CLINIC | Age: 57
End: 2022-01-20

## 2022-01-20 NOTE — TELEPHONE ENCOUNTER
----- Message from Jay Radha sent at 1/20/2022  3:46 PM EST -----  Subject: Message to Provider    QUESTIONS  Information for Provider? Patient would like to know if she still needs to   take the 4 medications that she was prescribed at the hospital as she is   out of all of them.  ---------------------------------------------------------------------------  --------------  [address]  What is the best way for the office to contact you? OK to leave message on   voicemail  Preferred Call Back Phone Number? [phone]  ---------------------------------------------------------------------------  --------------  SCRIPT ANSWERS  Relationship to Patient?  Self

## 2022-01-21 ENCOUNTER — TELEPHONE (OUTPATIENT)
Dept: FAMILY MEDICINE CLINIC | Age: 57
End: 2022-01-21

## 2022-01-21 NOTE — TELEPHONE ENCOUNTER
Patient called requesting to get refills on the following medication's that were prescribed on her last hospital visit, which was back on 10/07/2021    amiodarone 200 MG tablet  Commonly known as: CORDARONE  Take 1 tablet by mouth 2 times daily      apixaban 5 MG Tabs tablet  Commonly known as: ELIQUIS  Take 1 tablet by mouth 2 times daily      famotidine 20 MG tablet  Commonly known as: PEPCID  Take 1 tablet by mouth 2 times daily      metoprolol succinate 25 MG extended release tablet  Commonly known as: TOPROL XL  Take 1 tablet by mouth daily     Patient stated that she has enough for this month. She will just need these further refills starting next month. Also she did schedule  hospital on 01/26/22. Please advise.

## 2022-01-26 ENCOUNTER — VIRTUAL VISIT (OUTPATIENT)
Dept: FAMILY MEDICINE CLINIC | Age: 57
End: 2022-01-26
Payer: MEDICARE

## 2022-01-26 DIAGNOSIS — E78.2 MIXED HYPERLIPIDEMIA: ICD-10-CM

## 2022-01-26 DIAGNOSIS — I47.20 VENTRICULAR TACHYARRHYTHMIA: Primary | ICD-10-CM

## 2022-01-26 DIAGNOSIS — Z00.00 PREVENTATIVE HEALTH CARE: ICD-10-CM

## 2022-01-26 DIAGNOSIS — J41.0 SIMPLE CHRONIC BRONCHITIS (HCC): ICD-10-CM

## 2022-01-26 DIAGNOSIS — R73.03 PREDIABETES: ICD-10-CM

## 2022-01-26 PROCEDURE — 99214 OFFICE O/P EST MOD 30 MIN: CPT | Performed by: FAMILY MEDICINE

## 2022-01-26 RX ORDER — METOPROLOL SUCCINATE 25 MG/1
25 TABLET, EXTENDED RELEASE ORAL DAILY
Qty: 30 TABLET | Refills: 3 | Status: SHIPPED | OUTPATIENT
Start: 2022-01-26 | End: 2022-09-01

## 2022-01-26 RX ORDER — TIOTROPIUM BROMIDE INHALATION SPRAY 1.56 UG/1
SPRAY, METERED RESPIRATORY (INHALATION)
Qty: 4 G | Refills: 2 | Status: SHIPPED | OUTPATIENT
Start: 2022-01-26 | End: 2022-07-01

## 2022-01-26 RX ORDER — ALBUTEROL SULFATE 90 UG/1
2 AEROSOL, METERED RESPIRATORY (INHALATION) EVERY 6 HOURS PRN
Qty: 1 EACH | Refills: 0 | Status: SHIPPED | OUTPATIENT
Start: 2022-01-26 | End: 2022-03-22 | Stop reason: SDUPTHER

## 2022-01-26 RX ORDER — ASPIRIN 81 MG/1
TABLET ORAL
Qty: 90 TABLET | Refills: 3 | Status: SHIPPED | OUTPATIENT
Start: 2022-01-26 | End: 2022-03-22 | Stop reason: ALTCHOICE

## 2022-01-26 RX ORDER — AMIODARONE HYDROCHLORIDE 200 MG/1
200 TABLET ORAL 2 TIMES DAILY
Qty: 90 TABLET | Refills: 5 | Status: SHIPPED | OUTPATIENT
Start: 2022-01-26 | End: 2022-03-22 | Stop reason: ALTCHOICE

## 2022-01-26 RX ORDER — ATORVASTATIN CALCIUM 40 MG/1
40 TABLET, FILM COATED ORAL DAILY
Qty: 30 TABLET | Refills: 1 | Status: SHIPPED | OUTPATIENT
Start: 2022-01-26 | End: 2022-03-21

## 2022-01-26 ASSESSMENT — PATIENT HEALTH QUESTIONNAIRE - PHQ9
SUM OF ALL RESPONSES TO PHQ QUESTIONS 1-9: 0
1. LITTLE INTEREST OR PLEASURE IN DOING THINGS: 0
SUM OF ALL RESPONSES TO PHQ QUESTIONS 1-9: 0
SUM OF ALL RESPONSES TO PHQ QUESTIONS 1-9: 0
SUM OF ALL RESPONSES TO PHQ9 QUESTIONS 1 & 2: 0
2. FEELING DOWN, DEPRESSED OR HOPELESS: 0
SUM OF ALL RESPONSES TO PHQ QUESTIONS 1-9: 0

## 2022-01-26 NOTE — PROGRESS NOTES
Suha Jesus is a 49 y.o. female evaluated via telephone on 1/26/2022. Chief Complaint   Patient presents with    COPD     ER visit        Consent:  She and/or health care decision maker is aware that that she may receive a bill for this telephone service, which includes applicable co-pays, depending on her insurance coverage, and has provided verbal consent to proceed. Documentation:  I communicated with the patient and/or health care decision maker about ,  Patient not seen in the office more than 2 years. Was admitted in October in the hospital with palpitations and was found to be in V. tach. Patient had stress test done which was negative. She also had echocardiogram done that showed ejection fraction normal.  Patient was supposed to follow-up with cardiologist and had an appointment in January 2 which she has to reschedule because of transport. Patient was sent home on amiodarone and Eliquis ran out from last few days. Patient is also on metoprolol. She denies any chest pain shortness of breath needs medication refills. Patient has COPD on inhalers denies any recent exacerbation. Patient is not up-to-date on her health maintenance and screening tests still smokes. Patient needs appointment in the office in 4 weeks labs ordered.           10/8: Patient was having tachycardia overnight. EKG showed V. tach, A. Fib with RVR  Stress test was canceled due to A. fib with RVR  Patient started amiodarone drip        10/9: Medical was done yesterday, ejection fraction 60 to 65%, stress test to be done today if echo was unremarkable. WBC 13, afebrile. Patient amiodarone GTT, Heparin GTT  Toprol XL 25 mg p.o. Details of this discussion including any medical advice provided:     1. Ventricular tachyarrhythmia [facility])  Discussed with patient about the importance of seeing cardiologist, refill medications patient has information will call to schedule.   Close follow-up in office in 4 weeks  - amiodarone (CORDARONE) 200 MG tablet; Take 1 tablet by mouth 2 times daily  Dispense: 90 tablet; Refill: 5  - apixaban (ELIQUIS) 5 MG TABS tablet; Take 1 tablet by mouth 2 times daily  Dispense: 60 tablet; Refill: 3  - metoprolol succinate (TOPROL XL) 25 MG extended release tablet; Take 1 tablet by mouth daily  Dispense: 30 tablet; Refill: 3    2. Mixed hyperlipidemia  Restart on Lipitor and aspirin recheck lipid panel  - Lipid Panel; Future  - aspirin (ASPIRIN LOW DOSE) 81 MG EC tablet; TAKE 1 TABLET BY MOUTH DAILY  Dispense: 90 tablet; Refill: 3  - atorvastatin (LIPITOR) 40 MG tablet; Take 1 tablet by mouth daily  Dispense: 30 tablet; Refill: 1    3. Prediabetes  Recheck A1c  - Hemoglobin A1C; Future    4. Simple chronic bronchitis (HCC)  Refill medications follow-up in 4 weeks  - tiotropium (SPIRIVA RESPIMAT) 1.25 MCG/ACT AERS inhaler; INHALE 2 PUFFS BY MOUTH INTO THE LUNGS ONCE DAILY  Dispense: 4 g; Refill: 2  - albuterol sulfate HFA (VENTOLIN HFA) 108 (90 Base) MCG/ACT inhaler; Inhale 2 puffs into the lungs every 6 hours as needed for Wheezing  Dispense: 1 each; Refill: 0    5. Preventative health care    - Hepatitis C Antibody; Future          I affirm this is a Patient Initiated Episode with a Patient who has not had a related appointment within my department in the past 7 days or scheduled within the next 24 hours. Patient identification was verified at the start of the visit: Yes    Total Time: minutes: 21-30 minutes    Catherine Paz, was evaluated through a synchronous (real-time) audio-video encounter. The patient (or guardian if applicable) is aware that this is a billable service, which includes applicable co-pays. This Virtual Visit was conducted with patient's (and/or legal guardian's) consent.  The visit was conducted pursuant to the emergency declaration under the [facility], 1135 waiver authority and the [place] [employer] and Response Supplemental Appropriations Act. Patient identification was verified, and a caregiver was present when appropriate. The patient was located at home in a state where the provider was licensed to provide care.        Note: not billable if this call serves to triage the patient into an appointment for the relevant concern      Danita Elder MD

## 2022-02-28 RX ORDER — FAMOTIDINE 20 MG/1
TABLET, FILM COATED ORAL
Qty: 60 TABLET | Refills: 3 | Status: SHIPPED | OUTPATIENT
Start: 2022-02-28 | End: 2022-07-01 | Stop reason: SDUPTHER

## 2022-02-28 NOTE — TELEPHONE ENCOUNTER
E-scribe request for med refills. Please review and e-scribe if applicable. Last Visit Date:  1/26/22  Next Visit Date:  Visit date not found    Hemoglobin A1C (%)   Date Value   08/01/2019 5.6   01/24/2019 5.8   08/23/2018 5.8             ( goal A1C is < 7)   No results found for: LABMICR  LDL Cholesterol (mg/dL)   Date Value   01/24/2019 151 (H)       (goal LDL is <100)   AST (U/L)   Date Value   10/07/2021 13     ALT (U/L)   Date Value   10/07/2021 16     BUN (mg/dL)   Date Value   10/09/2021 10     BP Readings from Last 3 Encounters:   10/09/21 (!) 109/58   10/09/20 127/70   03/13/20 118/78          (goal 120/80)        Patient Active Problem List:     IBS (irritable bowel syndrome)     Depression     Proteinuria     CKD (chronic kidney disease) stage 1, GFR 90 ml/min or greater     Mixed hyperlipidemia     Thyroid nodule     Chronic obstructive pulmonary disease (HCC)     Family history of colon cancer     Personal history of tobacco use     Calcification of right breast     Breast calcification, left     Incomplete rectal prolapse-Congenital defect.  Surgery at 5 yo     Prediabetes     Acute pain of left knee     Intramural leiomyoma of uterus     PMB (postmenopausal bleeding)     Adrenal adenoma     Endometrial thickening on ultrasound     Adrenal nodule (HCC)     Pain of left lower extremity     Instability of left knee joint     Imperforate anus     Incontinence of feces     Chest pain     Atrial fibrillation with RVR ([address]      ----Sergio Montana

## 2022-03-21 DIAGNOSIS — E78.2 MIXED HYPERLIPIDEMIA: ICD-10-CM

## 2022-03-21 RX ORDER — ATORVASTATIN CALCIUM 40 MG/1
40 TABLET, FILM COATED ORAL DAILY
Qty: 30 TABLET | Refills: 1 | Status: SHIPPED | OUTPATIENT
Start: 2022-03-21 | End: 2022-05-31

## 2022-03-22 ENCOUNTER — OFFICE VISIT (OUTPATIENT)
Dept: FAMILY MEDICINE CLINIC | Age: 57
End: 2022-03-22
Payer: MEDICARE

## 2022-03-22 ENCOUNTER — HOSPITAL ENCOUNTER (OUTPATIENT)
Age: 57
Discharge: HOME | End: 2022-03-22
Payer: MEDICARE

## 2022-03-22 VITALS
SYSTOLIC BLOOD PRESSURE: 128 MMHG | OXYGEN SATURATION: 97 % | TEMPERATURE: 97.1 F | HEART RATE: 60 BPM | BODY MASS INDEX: 25.69 KG/M2 | HEIGHT: 65 IN | DIASTOLIC BLOOD PRESSURE: 66 MMHG | WEIGHT: 154.2 LBS

## 2022-03-22 DIAGNOSIS — F32.5 MAJOR DEPRESSIVE DISORDER WITH SINGLE EPISODE, IN FULL REMISSION (HCC): ICD-10-CM

## 2022-03-22 DIAGNOSIS — Z12.11 SCREENING FOR COLORECTAL CANCER: ICD-10-CM

## 2022-03-22 DIAGNOSIS — E78.2 MIXED HYPERLIPIDEMIA: ICD-10-CM

## 2022-03-22 DIAGNOSIS — Z12.12 SCREENING FOR COLORECTAL CANCER: ICD-10-CM

## 2022-03-22 DIAGNOSIS — Z00.01 ENCOUNTER FOR WELL ADULT EXAM WITH ABNORMAL FINDINGS: Primary | ICD-10-CM

## 2022-03-22 DIAGNOSIS — Z12.31 SCREENING MAMMOGRAM FOR HIGH-RISK PATIENT: ICD-10-CM

## 2022-03-22 DIAGNOSIS — N18.1 CKD (CHRONIC KIDNEY DISEASE) STAGE 1, GFR 90 ML/MIN OR GREATER: ICD-10-CM

## 2022-03-22 DIAGNOSIS — R73.03 PREDIABETES: ICD-10-CM

## 2022-03-22 DIAGNOSIS — Z87.891 PERSONAL HISTORY OF TOBACCO USE, PRESENTING HAZARDS TO HEALTH: ICD-10-CM

## 2022-03-22 DIAGNOSIS — J41.0 SIMPLE CHRONIC BRONCHITIS (HCC): ICD-10-CM

## 2022-03-22 DIAGNOSIS — M77.50 BONE SPUR OF FOOT: ICD-10-CM

## 2022-03-22 DIAGNOSIS — Z87.891 PERSONAL HISTORY OF TOBACCO USE: ICD-10-CM

## 2022-03-22 DIAGNOSIS — Z12.11 COLON CANCER SCREENING: ICD-10-CM

## 2022-03-22 DIAGNOSIS — I10 PRIMARY HYPERTENSION: ICD-10-CM

## 2022-03-22 DIAGNOSIS — I48.19 ATRIAL FIBRILLATION, PERSISTENT (HCC): ICD-10-CM

## 2022-03-22 PROBLEM — R07.9 CHEST PAIN: Status: RESOLVED | Noted: 2021-10-07 | Resolved: 2022-03-22

## 2022-03-22 LAB
ALBUMIN SERPL-MCNC: 4.4 G/DL (ref 3.5–5.2)
ALP BLD-CCNC: 122 U/L (ref 35–104)
ALT SERPL-CCNC: 45 U/L (ref 5–33)
ANION GAP SERPL CALCULATED.3IONS-SCNC: 11 MMOL/L (ref 9–17)
AST SERPL-CCNC: 36 U/L
BILIRUB SERPL-MCNC: 0.4 MG/DL (ref 0.3–1.2)
BUN BLDV-MCNC: 15 MG/DL (ref 6–20)
CALCIUM SERPL-MCNC: 9.5 MG/DL (ref 8.6–10.4)
CHLORIDE BLD-SCNC: 106 MMOL/L (ref 98–107)
CO2: 26 MMOL/L (ref 20–31)
CREAT SERPL-MCNC: 0.91 MG/DL (ref 0.5–0.9)
GFR AFRICAN AMERICAN: >60 ML/MIN
GFR NON-AFRICAN AMERICAN: >60 ML/MIN
GFR SERPL CREATININE-BSD FRML MDRD: ABNORMAL ML/MIN/{1.73_M2}
GLUCOSE BLD-MCNC: 100 MG/DL (ref 70–99)
HCT VFR BLD CALC: 47.5 % (ref 36–46)
HEMOGLOBIN: 16.1 G/DL (ref 12–16)
MCH RBC QN AUTO: 29.6 PG (ref 26–34)
MCHC RBC AUTO-ENTMCNC: 33.8 G/DL (ref 31–37)
MCV RBC AUTO: 87.4 FL (ref 80–100)
PDW BLD-RTO: 16.2 % (ref 11.5–14.9)
PLATELET # BLD: 493 K/UL (ref 150–450)
PMV BLD AUTO: 8.2 FL (ref 6–12)
POTASSIUM SERPL-SCNC: 4.8 MMOL/L (ref 3.7–5.3)
RBC # BLD: 5.44 M/UL (ref 4–5.2)
SODIUM BLD-SCNC: 143 MMOL/L (ref 135–144)
TOTAL PROTEIN: 7 G/DL (ref 6.4–8.3)
WBC # BLD: 10.5 K/UL (ref 3.5–11)

## 2022-03-22 PROCEDURE — 85027 COMPLETE CBC AUTOMATED: CPT

## 2022-03-22 PROCEDURE — 99396 PREV VISIT EST AGE 40-64: CPT | Performed by: FAMILY MEDICINE

## 2022-03-22 PROCEDURE — G8484 FLU IMMUNIZE NO ADMIN: HCPCS | Performed by: FAMILY MEDICINE

## 2022-03-22 PROCEDURE — 80053 COMPREHEN METABOLIC PANEL: CPT

## 2022-03-22 PROCEDURE — 36415 COLL VENOUS BLD VENIPUNCTURE: CPT

## 2022-03-22 RX ORDER — BLOOD PRESSURE TEST KIT
KIT MISCELLANEOUS
Qty: 1 KIT | Refills: 0 | Status: SHIPPED | OUTPATIENT
Start: 2022-03-22

## 2022-03-22 RX ORDER — FLECAINIDE ACETATE 50 MG/1
TABLET ORAL
COMMUNITY
Start: 2022-03-21

## 2022-03-22 RX ORDER — NICOTINE 21 MG/24HR
1 PATCH, TRANSDERMAL 24 HOURS TRANSDERMAL DAILY
Qty: 42 PATCH | Refills: 2 | Status: SHIPPED | OUTPATIENT
Start: 2022-03-22 | End: 2022-08-17

## 2022-03-22 RX ORDER — NITROGLYCERIN 0.4 MG/1
0.4 TABLET SUBLINGUAL EVERY 5 MIN PRN
Qty: 25 TABLET | Refills: 3 | Status: SHIPPED | OUTPATIENT
Start: 2022-03-22

## 2022-03-22 RX ORDER — ALBUTEROL SULFATE 90 UG/1
2 AEROSOL, METERED RESPIRATORY (INHALATION) EVERY 6 HOURS PRN
Qty: 1 EACH | Refills: 0 | Status: SHIPPED | OUTPATIENT
Start: 2022-03-22 | End: 2022-04-15

## 2022-03-22 SDOH — ECONOMIC STABILITY: FOOD INSECURITY: WITHIN THE PAST 12 MONTHS, THE FOOD YOU BOUGHT JUST DIDN'T LAST AND YOU DIDN'T HAVE MONEY TO GET MORE.: OFTEN TRUE

## 2022-03-22 SDOH — ECONOMIC STABILITY: FOOD INSECURITY: WITHIN THE PAST 12 MONTHS, YOU WORRIED THAT YOUR FOOD WOULD RUN OUT BEFORE YOU GOT MONEY TO BUY MORE.: OFTEN TRUE

## 2022-03-22 ASSESSMENT — ENCOUNTER SYMPTOMS
CHEST TIGHTNESS: 0
RHINORRHEA: 0
RECTAL PAIN: 0
STRIDOR: 0
SORE THROAT: 0
BACK PAIN: 0
SINUS PRESSURE: 0
COUGH: 0
NAUSEA: 0
VOMITING: 0
EYE REDNESS: 0
DIARRHEA: 0
COLOR CHANGE: 0
SHORTNESS OF BREATH: 0
BLOOD IN STOOL: 0
ABDOMINAL PAIN: 0
ABDOMINAL DISTENTION: 0
WHEEZING: 0
CONSTIPATION: 0
TROUBLE SWALLOWING: 0

## 2022-03-22 ASSESSMENT — PATIENT HEALTH QUESTIONNAIRE - PHQ9
2. FEELING DOWN, DEPRESSED OR HOPELESS: 1
SUM OF ALL RESPONSES TO PHQ QUESTIONS 1-9: 1
SUM OF ALL RESPONSES TO PHQ QUESTIONS 1-9: 1
SUM OF ALL RESPONSES TO PHQ9 QUESTIONS 1 & 2: 1
1. LITTLE INTEREST OR PLEASURE IN DOING THINGS: 0
SUM OF ALL RESPONSES TO PHQ QUESTIONS 1-9: 1
SUM OF ALL RESPONSES TO PHQ QUESTIONS 1-9: 1

## 2022-03-22 ASSESSMENT — SOCIAL DETERMINANTS OF HEALTH (SDOH): HOW HARD IS IT FOR YOU TO PAY FOR THE VERY BASICS LIKE FOOD, HOUSING, MEDICAL CARE, AND HEATING?: HARD

## 2022-03-22 NOTE — PROGRESS NOTES
3/22/2022      Giancarlo Douglas (:  1965) is a 56 y.o. female, here for a preventive medicine evaluation, Annual Exam   .      ASSESSMENT/PLAN:    1. Encounter for well adult exam with abnormal findings  2. Simple chronic bronchitis (HCC)  -     albuterol sulfate HFA (VENTOLIN HFA) 108 (90 Base) MCG/ACT inhaler; Inhale 2 puffs into the lungs every 6 hours as needed for Wheezing, Disp-1 each, R-0Normal  3. Atrial fibrillation, persistent (HCC)  -     nitroGLYCERIN (NITROSTAT) 0.4 MG SL tablet; Place 1 tablet under the tongue every 5 minutes as needed for Chest pain up to max of 3 total doses. If no relief after 1 dose, call 911., Disp-25 tablet, R-3Normal  4. CKD (chronic kidney disease) stage 1, GFR 90 ml/min or greater  -     CBC; Future  -     Comprehensive Metabolic Panel; Future  5. Major depressive disorder with single episode, in full remission ([facility] [address]  Stable not on any medications    6. Prediabetes  7. Mixed hyperlipidemia  Continue statins repeat lipid panel  8. Bone spur of foot  -     [address] [place] [address], [facility]  9. Screening mammogram for high-risk patient  -     [facility] VALERIE DIGITAL SCREEN BILATERAL; Future  10. Colon cancer screening  -     Fecal DNA Colorectal cancer screening (Cologuard)  11. Primary hypertension  -     Blood Pressure KIT; Disp-1 kit, R-0, PrintDx: HTN. Needs automatic blood pressure machine to monitor her blood pressure. 12. Personal history of tobacco use  -     nicotine (NICODERM CQ) 14 MG/24HR; Place 1 patch onto the skin daily, Disp-42 patch, R-2Normal  13. Personal history of tobacco use, presenting hazards to health  -     [place] TOBACCO USE CESSATION INTERMEDIATE 3-10 MINUTES [25174]  -     [place] TOBACCO USE CESSATION INTERMEDIATE 3-10 MINUTES [76197]  14. Screening for colorectal cancer  -     Cologuard;  Future      Low carb, low fat diet, increase fruits and vegetables, and exercise 4-5 times a week 30-40 minutes a day, or walk 1-2 hours per day, or Instability of left knee joint    Imperforate anus    Incontinence of feces    Atrial fibrillation with RVR (HCC)       Prior to Visit Medications    Medication Sig Taking? Authorizing Provider   flecainide (TAMBOCOR) 50 MG tablet  Yes Historical Provider, MD   albuterol sulfate HFA (VENTOLIN HFA) 108 (90 Base) MCG/ACT inhaler Inhale 2 puffs into the lungs every 6 hours as needed for Wheezing Yes Isaura Estrada MD   nitroGLYCERIN (NITROSTAT) 0.4 MG SL tablet Place 1 tablet under the tongue every 5 minutes as needed for Chest pain up to max of 3 total doses. If no relief after 1 dose, call 911. Yes Isaura Estrada MD   Blood Pressure KIT Dx: HTN. Needs automatic blood pressure machine to monitor her blood pressure.  Yes Isaura Estrada MD   nicotine (NICODERM CQ) 14 MG/24HR Place 1 patch onto the skin daily Yes Isaura Estrada MD   atorvastatin (LIPITOR) 40 MG tablet TAKE 1 TABLET BY MOUTH DAILY Yes Isaura Estrada MD   famotidine (PEPCID) 20 MG tablet TAKE 1 TABLET BY MOUTH TWICE DAILY Yes Hannah Gill MD   apixaban (ELIQUIS) 5 MG TABS tablet Take 1 tablet by mouth 2 times daily Yes Isaura Estrada MD   tiotropium (SPIRIVA RESPIMAT) 1.25 MCG/ACT AERS inhaler INHALE 2 PUFFS BY MOUTH INTO THE LUNGS ONCE DAILY Yes Isaura Estrada MD   metoprolol succinate (TOPROL XL) 25 MG extended release tablet Take 1 tablet by mouth daily Yes Isaura Estrada MD   Elastic Bandages & Supports (KNEE BRACE/HINGED/LARGE) MISC Use daily for knee pain Yes Isaura Estrada MD   Blood Pressure Monitor KIT TAKE BLOOD PRESSURE DAILY  Patient not taking: Reported on 3/22/2022  Juan Rose MD        No Known Allergies    Past Medical History:   Diagnosis Date    Chronic kidney disease     CKD (chronic kidney disease) stage 1, GFR 90 ml/min or greater     COPD (chronic obstructive pulmonary disease) ([facility] [address]     Depression     Hyperlipidemia     Hypocalcemia     IBS (irritable bowel syndrome)     Proteinuria        Past Surgical History:   Procedure Laterality Date     SECTION      CS x 2    COLONOSCOPY      TUBAL LIGATION Bilateral        .  Social History     Tobacco Use    Smoking status: Current Every Day Smoker     Packs/day: 0.50     Years: 28.00     Pack years: 14.00     Types: Cigarettes    Smokeless tobacco: Never Used    Tobacco comment: down to 5 cigs per day   Vaping Use    Vaping Use: Never used   Substance Use Topics    Alcohol use: No     Alcohol/week: 0.0 standard drinks    Drug use: No       Family History   Problem Relation Age of Onset    Asthma Father     Cancer Father         colon    Diabetes Father     COPD Father     Heart Failure Father     Seizures Mother        ADVANCE DIRECTIVE: N, <no information>    SUBJECTIVE / Stephan Kilgore is here for Annual Exam     Patient is scheduled for physical, she has not done blood work which was ordered on previous appointment. History of A. fib heart rate is under control follows with cardiologist, amiodarone stopped started on flecainide. Currently she is on Eliquis. COPD stable on inhalers, has cut down on smoking to 5 cigarettes on nicotine patches. CKD stage IV stable. Depression stable not on any medications    Prediabetes labs reprinted including lipid panel. Urinary symptoms: no    Sexually active: Yes     last pap: was normal patient is due for Pap  The patient has no history of abnormal PAP    Last mammogram: Due for mammogram, order done the patient has no family history of breast cancer    Wears seatbelts: yes     Regular exercise: yes  Ever been transfused or tattooed?: not asked  The patient reports that domestic violence in her life is absent.      Last eye exam: up to date: Yes        Visual Acuity Screening    Right eye Left eye Both eyes   Without correction: 20/30 20/50 20/25   With correction:          Hearing:normal :Yes    Last dental exam and preventative dental cleaning: up to date : Yes    Nutritional assessment: Body mass index is 25.66 kg/m². Normal.     Weight is   Wt Readings from Last 3 Encounters:   03/22/22 154 lb 3.2 oz (69.9 kg)   10/07/21 151 lb 7.3 oz (68.7 kg)   10/09/20 180 lb (81.6 kg)       Healthful diet and Physical activity counseling to prevent CVD- low carb, low fat diet, increase fruits and vegetables, and exercise 4-5 times a day 30-40minutes a day discussed    Nicotine dependence. yes -   Smoker, counseling given to quit smoking. Ready to quit: Not Answered  Counseling given: Not Answered  Comment: down to 5 cigs per day      Alcohol use: no    Immunization History   Administered Date(s) Administered    Pneumococcal Polysaccharide ([id number]) 08/13/2018    Tdap (Boostrix, Adacel) 10/14/2017       COVID-19 vaccine:  No: Encourage patient to schedule COVID vaccine     Tdap one time, then Td every 10 years-up to date:  No: Not due    Influenza annually-up to date:  No:     PPSV 23 in all adults 19-65 yo with chronic conditions,smokers, alcoholism,  nursing home residents; then PCV 13 at 73 yo-up to date: Yes or N/A    Menopause: Yes   Patient's last menstrual period was 04/16/2016.      Colon cancer screening recommended at 39years old , not done  The patient has no family history of colon cancer    Blood pressure is normal.  BP Readings from Last 3 Encounters:   03/22/22 128/66   10/09/21 (!) 109/58   10/09/20 127/70       Pulse is normal.  Pulse Readings from Last 3 Encounters:   03/22/22 60   10/09/21 73   10/09/20 73       A1c is   Lab Results   Component Value Date    LABA1C 5.6 08/01/2019    LABA1C 5.8 01/24/2019    LABA1C 5.8 08/23/2018       Lipid screening -    Lab Results   Component Value Date    CHOL 210 (H) 01/24/2019    CHOL 303 (H) 08/08/2018    CHOL 400 (H) 06/23/2016     Lab Results   Component Value Date    TRIG 99 01/24/2019    TRIG 162 (H) 08/08/2018    TRIG 252 (H) 06/23/2016     Lab Results   Component Value Date    HDL 39 (L) 01/24/2019    HDL 47 08/08/2018 throat and trouble swallowing. Eyes: Negative for redness and visual disturbance. Respiratory: Negative for cough, chest tightness, shortness of breath, wheezing and stridor. Cardiovascular: Negative for chest pain, palpitations and leg swelling. Gastrointestinal: Negative for abdominal distention, abdominal pain, blood in stool, constipation, diarrhea, nausea, rectal pain and vomiting. Endocrine: Negative for polydipsia, polyphagia and polyuria. Genitourinary: Negative for difficulty urinating, flank pain, frequency and urgency. Musculoskeletal: Positive for arthralgias. Negative for back pain, gait problem, myalgias and neck pain. Foot pain bilateral   Skin: Negative for color change, rash and wound. Allergic/Immunologic: Negative for food allergies and immunocompromised state. Neurological: Negative for dizziness, speech difficulty, weakness, light-headedness, numbness and headaches. Psychiatric/Behavioral: Negative for agitation, behavioral problems, decreased concentration, dysphoric mood, hallucinations, sleep disturbance and suicidal ideas. The patient is nervous/anxious.          -vital signs stable and within normal limits except     /66   Pulse 60   Temp 97.1 °F (36.2 °C)   Ht 5' 5\" (1.651 m)   Wt 154 lb 3.2 oz (69.9 kg)   LMP 04/16/2016   SpO2 97%   BMI 25.66 kg/m²   Vitals:    03/22/22 1153   BP: 128/66   Pulse: 60   Temp: 97.1 °F (36.2 °C)   SpO2: 97%   Weight: 154 lb 3.2 oz (69.9 kg)   Height: 5' 5\" (1.651 m)     Estimated body mass index is 25.66 kg/m² as calculated from the following:    Height as of this encounter: 5' 5\" (1.651 m). Weight as of this encounter: 154 lb 3.2 oz (69.9 kg). Physical Exam  Vitals and nursing note reviewed. Constitutional:       General: She is not in acute distress. Appearance: Normal appearance. She is well-developed. She is not diaphoretic. HENT:      Head: Normocephalic and atraumatic.       Right Ear: Tympanic membrane and ear canal normal.      Left Ear: Tympanic membrane and ear canal normal.      Nose: Nose normal.      Mouth/Throat:      Mouth: Mucous membranes are moist.   Eyes:      General:         Right eye: No discharge. Left eye: No discharge. Extraocular Movements: Extraocular movements intact. Conjunctiva/sclera: Conjunctivae normal.      Pupils: Pupils are equal, round, and reactive to light. Neck:      Thyroid: No thyromegaly. Cardiovascular:      Rate and Rhythm: Normal rate and regular rhythm. Heart sounds: Normal heart sounds. No murmur heard. Pulmonary:      Effort: Pulmonary effort is normal. No respiratory distress. Breath sounds: Normal breath sounds. No wheezing or rhonchi. Abdominal:      General: Bowel sounds are normal. There is no distension. Palpations: Abdomen is soft. There is no mass. Tenderness: There is no abdominal tenderness. There is no right CVA tenderness, left CVA tenderness, guarding or rebound. Musculoskeletal:         General: No tenderness. Cervical back: Normal range of motion and neck supple. No rigidity or spasms. Normal range of motion. Thoracic back: No spasms. Normal range of motion. Lumbar back: No spasms. Normal range of motion. Lymphadenopathy:      Cervical: No cervical adenopathy. Skin:     Coloration: Skin is not jaundiced or pale. Findings: No bruising, erythema or rash. Neurological:      General: No focal deficit present. Mental Status: She is alert and oriented to person, place, and time. Cranial Nerves: No cranial nerve deficit. Sensory: No sensory deficit. Motor: No weakness or tremor. Coordination: Coordination normal.      Gait: Gait and tandem walk normal.      Deep Tendon Reflexes: Reflexes are normal and symmetric. Psychiatric:         Attention and Perception: Attention and perception normal. She is attentive. Mood and Affect: Mood is anxious. Mood is not depressed. Affect is not tearful. Speech: She is communicative. Speech is not rapid and pressured, delayed or slurred. Behavior: Behavior normal. Behavior is not agitated or slowed. Thought Content: Thought content normal.         Judgment: Judgment normal.         No flowsheet data found. I personally reviewed testing with patient. Otherwise labs within normal limits      Lab Results   Component Value Date    WBC 13.9 (H) 10/09/2021    HGB 14.5 10/09/2021    HCT 43.7 10/09/2021    MCV 87.5 10/09/2021     (H) 10/09/2021       Lab Results   Component Value Date     10/09/2021    K 4.1 10/09/2021     10/09/2021    CO2 23 10/09/2021    BUN 10 10/09/2021    CREATININE 0.52 10/09/2021    GLUCOSE 122 10/09/2021    CALCIUM 8.7 10/09/2021        Lab Results   Component Value Date    ALT 16 10/07/2021    AST 13 10/07/2021    ALKPHOS 105 (H) 10/07/2021    BILITOT 0.31 10/07/2021       Lab Results   Component Value Date    TSH 0.52 10/07/2021       Lab Results   Component Value Date    LDLCHOLESTEROL 151 (H) 01/24/2019       Lab Results   Component Value Date    LABA1C 5.6 08/01/2019       No results found for: [id number]    No results found for: FOLATE    No results found for: IRON, TIBC, FERRITIN    No results found for: VITD25      Orders Placed This Encounter   Medications    albuterol sulfate HFA (VENTOLIN HFA) 108 (90 Base) MCG/ACT inhaler     Sig: Inhale 2 puffs into the lungs every 6 hours as needed for Wheezing     Dispense:  1 each     Refill:  0    nitroGLYCERIN (NITROSTAT) 0.4 MG SL tablet     Sig: Place 1 tablet under the tongue every 5 minutes as needed for Chest pain up to max of 3 total doses. If no relief after 1 dose, call 911. Dispense:  25 tablet     Refill:  3    Blood Pressure KIT     Sig: Dx: HTN. Needs automatic blood pressure machine to monitor her blood pressure.      Dispense:  1 kit     Refill:  0    nicotine (NICODERM CQ) 14 MG/24HR     Sig: Place 1 patch onto the skin daily     Dispense:  42 patch     Refill:  2         Medications Discontinued During This Encounter   Medication Reason    amiodarone (CORDARONE) 200 MG tablet Alternate therapy    aspirin (ASPIRIN LOW DOSE) 81 MG EC tablet Alternate therapy    nitroGLYCERIN (NITROSTAT) 0.4 MG SL tablet REORDER    albuterol sulfate HFA (VENTOLIN HFA) 108 (90 Base) MCG/ACT inhaler REORDER    nicotine (NICODERM CQ) 14 MG/24HR REORDER         Orders Placed This Encounter   Procedures    Fecal DNA Colorectal cancer screening (Cologuard)    Cologuard     Fax Order to [facility] Status:   Future     Standing Expiration Date:   3/22/2023    MORE VALERIE DIGITAL SCREEN BILATERAL     Standing Status:   Future     Standing Expiration Date:   5/22/2023     Order Specific Question:   Reason for exam:     Answer:   screening    CBC     Standing Status:   Future     Standing Expiration Date:   3/22/2023    Comprehensive Metabolic Panel     Fasting 8 hrs     Standing Status:   Future     Standing Expiration Date:   3/22/2023   [address] [place], [place] [address]Mona DPM, Podiatry, [place]     Referral Priority:   Routine     Referral Type:   Eval and Treat     Referral Reason:   Specialty Services Required     Referred to Provider:   Mariajose Su DPM     Requested Specialty:   Podiatry     Number of Visits Requested:   1    [place] TOBACCO USE CESSATION INTERMEDIATE 3-10 MINUTES [27237]    [place] TOBACCO USE CESSATION INTERMEDIATE 3-10 MINUTES [61878]         This note was completed by using the assistance of a speech-recognition program. However, inadvertent computerized transcription errors may be present. Although every effort was made to ensure accuracy, no guarantees can be provided that every mistake has been identified and corrected by editing. An electronic signature was used to authenticate this note.     Electronically signed by Fabiana Murillo MD on 3/22/2022 at 12:30 PM    Cardiovascular Disease Risk Counseling: Assessed the patient's risk to develop cardiovascular disease and reviewed main risk factors. Reviewed steps to reduce disease risk including:   · Quitting tobacco use, reducing amount smoked, or not starting the habit  · Making healthy food choices  · Being physically active and gradualy increasing activity levels   · Reduce weight and determine a healthy BMI goal  · Monitor blood pressure and treat if higher than 140/90 mmHg  · Maintain blood total cholesterol levels under 5 mmol/l or 190 mg/dl  · Maintain LDL cholesterol levels under 3.0 mmol/l or 115 mg/dl   · Control blood glucose levels  · Consider taking aspirin (75 mg daily), once blood pressure is controlled   Provided a follow up plan.   Time spent (minutes): 45

## 2022-03-22 NOTE — PROGRESS NOTES
Visit Information    Have you changed or started any medications since your last visit including any over-the-counter medicines, vitamins, or herbal medicines? no   Have you stopped taking any of your medications? Is so, why? -  no  Are you having any side effects from any of your medications? - yes - depressed    Have you seen any other physician or provider since your last visit? yes    Have you had any other diagnostic tests since your last visit?  no   Have you been seen in the emergency room and/or had an admission in a hospital since we last saw you?  no   Have you had your routine dental cleaning in the past 6 months?  no     Do you have an active MyChart account? If no, what is the barrier?   Yes    Patient Care Team:  Nima Espinosa MD as PCP - General (Family Medicine)  Nima Espinosa MD as PCP - [facility] Provider  Laura Salazar MD as Surgeon (Colon and Rectal Surgery)    Medical History Review  Past Medical, Family, and Social History reviewed and does contribute to the patient presenting condition    Health Maintenance   Topic Date Due    Hepatitis C screen  Never done    COVID-19 Vaccine (1) Never done    Shingles Vaccine (1 of 2) Never done    Low dose CT lung screening  Never done    Colorectal Cancer Screen  08/30/2019    Lipid screen  01/24/2020    A1C test (Diabetic or Prediabetic)  08/01/2020    Breast cancer screen  09/12/2020    Cervical cancer screen  08/23/2021    Flu vaccine (1) Never done    TSH testing  10/07/2022    Depression Monitoring  01/26/2023    DTaP/Tdap/Td vaccine (2 - Td or Tdap) 10/14/2027    Pneumococcal 0-64 years Vaccine (2 of 2 - PPSV23) 02/14/2032    HIV screen  Completed    Hepatitis A vaccine  Aged Out    Hepatitis B vaccine  Aged Out    Hib vaccine  Aged Out    Meningococcal (ACWY) vaccine  Aged Out

## 2022-03-22 NOTE — PATIENT INSTRUCTIONS
A Healthy Lifestyle: Care Instructions  Your Care Instructions     A healthy lifestyle can help you feel good, stay at a healthy weight, and have plenty of energy for both work and play. A healthy lifestyle is something you can share with your whole family. A healthy lifestyle also can lower your risk for serious health problems, such as high blood pressure, heart disease, and diabetes. You can follow a few steps listed below to improve your health and the health of your family. Follow-up care is a key part of your treatment and safety. Be sure to make and go to all appointments, and call your doctor if you are having problems. It's also a good idea to know your test results and keep a list of the medicines you take. How can you care for yourself at home? · Do not eat too much sugar, fat, or fast foods. You can still have dessert and treats now and then. The goal is moderation. · Start small to improve your eating habits. Pay attention to portion sizes, drink less juice and soda pop, and eat more fruits and vegetables. ? Eat a healthy amount of food. A 3-ounce serving of meat, for example, is about the size of a deck of cards. Fill the rest of your plate with vegetables and whole grains. ? Limit the amount of soda and sports drinks you have every day. Drink more water when you are thirsty. ? Eat plenty of fruits and vegetables every day. Have an apple or some carrot sticks as an afternoon snack instead of a candy bar. Try to have fruits and/or vegetables at every meal.  · Make exercise part of your daily routine. You may want to start with simple activities, such as walking, bicycling, or slow swimming. Try to be active 30 to 60 minutes every day. You do not need to do all 30 to 60 minutes all at once. For example, you can exercise 3 times a day for 10 or 20 minutes.  Moderate exercise is safe for most people, but it is always a good idea to talk to your doctor before starting an exercise program.  · Keep moving. [employer] the lawn, work in the garden, or [employer]. Take the stairs instead of the elevator at work. · If you smoke, quit. People who smoke have an increased risk for heart attack, stroke, cancer, and other lung illnesses. Quitting is hard, but there are ways to boost your chance of quitting tobacco for good. ? Use nicotine gum, patches, or lozenges. ? Ask your doctor about stop-smoking programs and medicines. ? Keep trying. In addition to reducing your risk of diseases in the future, you will notice some benefits soon after you stop using tobacco. If you have shortness of breath or asthma symptoms, they will likely get better within a few weeks after you quit. · Limit how much alcohol you drink. Moderate amounts of alcohol (up to 2 drinks a day for men, 1 drink a day for women) are okay. But drinking too much can lead to liver problems, high blood pressure, and other health problems. Family health  If you have a family, there are many things you can do together to improve your health. · Eat meals together as a family as often as possible. · Eat healthy foods. This includes fruits, vegetables, lean meats and dairy, and whole grains. · Include your family in your fitness plan. Most people think of activities such as jogging or tennis as the way to fitness, but there are many ways you and your family can be more active. Anything that makes you breathe hard and gets your heart pumping is exercise. Here are some tips:  ? Walk to do errands or to take your child to school or the bus.  ? Go for a family bike ride after dinner instead of watching TV. Where can you learn more? Go to https://darcie.healthGertrude. org and sign in to your Yunno account. Enter T941 in the [facility] box to learn more about \"A Healthy Lifestyle: Care Instructions. \"     If you do not have an account, please click on the \"Sign Up Now\" link.   Current as of: June 16, 2021               Content Version: 13.1  © 0666-6672 Healthwise, Incorporated. Care instructions adapted under license by [facility] [place] ([facility]). If you have questions about a medical condition or this instruction, always ask your healthcare professional. [address] any warranty or liability for your use of this information. Stopping Smoking: Care Instructions  Your Care Instructions     Cigarette smokers crave the nicotine in cigarettes. Giving it up is much harder than simply changing a habit. Your body has to stop craving the nicotine. It is hard to quit, but you can do it. There are many tools that people use to quit smoking. You may find that combining tools works best for you. There are several steps to quitting. First you get ready to quit. Then you get support to help you. After that, you learn new skills and behaviors to become a nonsmoker. For many people, a necessary step is getting and using medicine. Your doctor will help you set up the plan that best meets your needs. You may want to attend a smoking cessation program to help you quit smoking. When you choose a program, look for one that has proven success. Ask your doctor for ideas. You will greatly increase your chances of success if you take medicine as well as get counseling or join a cessation program.  Some of the changes you feel when you first quit tobacco are uncomfortable. Your body will miss the nicotine at first, and you may feel short-tempered and grumpy. You may have trouble sleeping or concentrating. Medicine can help you deal with these symptoms. You may struggle with changing your smoking habits and rituals. The last step is the tricky one: Be prepared for the smoking urge to continue for a time. This is a lot to deal with, but keep at it. You will feel better. Follow-up care is a key part of your treatment and safety.  Be sure to make and go to all appointments, and call your doctor if you are having problems. It's also a good idea to know your test results and keep a list of the medicines you take. How can you care for yourself at home? · Ask your family, friends, and coworkers for support. You have a better chance of quitting if you have help and support. · Join a support group, such as Nicotine Anonymous, for people who are trying to quit smoking. · Consider signing up for a smoking cessation program, such as the American Lung Association's Freedom from Smoking program.  · Get text messaging support. Go to the website at www.smokefree. gov to sign up for the [facility] program.  · Set a quit date. Pick your date carefully so that it is not right in the middle of a big deadline or stressful time. Once you quit, do not even take a puff. Get rid of all ashtrays and lighters after your last cigarette. Clean your house and your clothes so that they do not smell of smoke. · Learn how to be a nonsmoker. Think about ways you can avoid those things that make you reach for a cigarette. ? Avoid situations that put you at greatest risk for smoking. For some people, it is hard to have a drink with friends without smoking. For others, they might skip a coffee break with coworkers who smoke. ? Change your daily routine. Take a different route to work or eat a meal in a different place. · Cut down on stress. Calm yourself or release tension by doing an activity you enjoy, such as reading a book, taking a hot bath, or gardening. · Talk to your doctor or pharmacist about nicotine replacement therapy, which replaces the nicotine in your body. You still get nicotine but you do not use tobacco. Nicotine replacement products help you slowly reduce the amount of nicotine you need. These products come in several forms, many of them available over-the-counter:  ? Nicotine patches  ? Nicotine gum and lozenges  ?  Nicotine inhaler  · Ask your doctor about bupropion (Wellbutrin) or varenicline (Chantix), which are prescription medicines. They do not contain nicotine. They help you by reducing withdrawal symptoms, such as stress and anxiety. · Some people find hypnosis, acupuncture, and massage helpful for ending the smoking habit. · Eat a healthy diet and get regular exercise. Having healthy habits will help your body move past its craving for nicotine. · Be prepared to keep trying. Most people are not successful the first few times they try to quit. Do not get mad at yourself if you smoke again. Make a list of things you learned and think about when you want to try again, such as next week, next month, or next year. Where can you learn more? Go to https://WAPA.Jeeves. org and sign in to your MindFuse account. Enter H282 in the [employer] box to learn more about \"Stopping Smoking: Care Instructions. \"     If you do not have an account, please click on the \"Sign Up Now\" link. Current as of: February 11, 2021               Content Version: 13.1  © 2006-2021 Healthwise, Incorporated. Care instructions adapted under license by [facility] ([facility]). If you have questions about a medical condition or this instruction, always ask your healthcare professional. [place] [id number] any warranty or liability for your use of this information.

## 2022-03-23 ENCOUNTER — TELEPHONE (OUTPATIENT)
Dept: FAMILY MEDICINE CLINIC | Age: 57
End: 2022-03-23

## 2022-03-23 NOTE — TELEPHONE ENCOUNTER
Faxed script of BLOOD PRESSURE KIT , along with [facility] NOTES to [facility] on [address]. Scanned in PG NOTES, along with script and confirmation in media. Placed script and confirmation in pile up front with other confirmations.

## 2022-03-30 ENCOUNTER — HOSPITAL ENCOUNTER (OUTPATIENT)
Dept: WOMENS IMAGING | Age: 57
Discharge: HOME | End: 2022-04-01
Payer: MEDICARE

## 2022-03-30 DIAGNOSIS — Z12.31 SCREENING MAMMOGRAM FOR HIGH-RISK PATIENT: ICD-10-CM

## 2022-03-30 PROCEDURE — 77063 BREAST TOMOSYNTHESIS BI: CPT

## 2022-04-14 DIAGNOSIS — J41.0 SIMPLE CHRONIC BRONCHITIS (HCC): ICD-10-CM

## 2022-04-15 NOTE — TELEPHONE ENCOUNTER
Please Approve or Refuse.   Send to Pharmacy per Pt's Request: jose r     Next Visit Date:  6/30/2022   Last Visit Date: 3/22/2022    Hemoglobin A1C (%)   Date Value   08/01/2019 5.6   01/24/2019 5.8   08/23/2018 5.8             ( goal A1C is < 7)   BP Readings from Last 3 Encounters:   03/22/22 128/66   10/09/21 (!) 109/58   10/09/20 127/70          (goal 120/80)  BUN   Date Value Ref Range Status   03/22/2022 15 6 - 20 mg/dL Final     CREATININE   Date Value Ref Range Status   03/22/2022 0.91 (H) 0.50 - 0.90 mg/dL Final     Potassium   Date Value Ref Range Status   03/22/2022 4.8 3.7 - 5.3 mmol/L Final

## 2022-05-28 DIAGNOSIS — E78.2 MIXED HYPERLIPIDEMIA: ICD-10-CM

## 2022-05-31 RX ORDER — ATORVASTATIN CALCIUM 40 MG/1
40 TABLET, FILM COATED ORAL DAILY
Qty: 30 TABLET | Refills: 1 | Status: SHIPPED | OUTPATIENT
Start: 2022-05-31 | End: 2022-08-22

## 2022-06-30 DIAGNOSIS — I47.20 VENTRICULAR TACHYARRHYTHMIA: ICD-10-CM

## 2022-06-30 DIAGNOSIS — J41.0 SIMPLE CHRONIC BRONCHITIS (HCC): ICD-10-CM

## 2022-07-01 DIAGNOSIS — K58.1 IRRITABLE BOWEL SYNDROME WITH CONSTIPATION: Primary | ICD-10-CM

## 2022-07-01 RX ORDER — TIOTROPIUM BROMIDE INHALATION SPRAY 1.56 UG/1
SPRAY, METERED RESPIRATORY (INHALATION)
Qty: 4 G | Refills: 2 | Status: SHIPPED | OUTPATIENT
Start: 2022-07-01 | End: 2022-10-14

## 2022-07-01 RX ORDER — APIXABAN 5 MG/1
TABLET, FILM COATED ORAL
Qty: 60 TABLET | Refills: 3 | Status: SHIPPED | OUTPATIENT
Start: 2022-07-01 | End: 2022-11-01

## 2022-07-01 RX ORDER — FAMOTIDINE 20 MG/1
TABLET, FILM COATED ORAL
Qty: 60 TABLET | Refills: 3 | OUTPATIENT
Start: 2022-07-01

## 2022-07-01 RX ORDER — FAMOTIDINE 20 MG/1
TABLET, FILM COATED ORAL
Qty: 60 TABLET | Refills: 3 | Status: SHIPPED | OUTPATIENT
Start: 2022-07-01

## 2022-07-01 NOTE — TELEPHONE ENCOUNTER
Not a patient with [facility] physicians forward to Dr. Nguyen Torre. Please address the medication refill and close the encounter. If I can be of assistance, please route to the applicable pool. Thank you.     Last visit:   Last Med refill:   Does patient have enough medication for 72 hours: No:     Next Visit Date:  Future Appointments   Date Time Provider Tiki Lewis   7/6/2022 11:00 AM Shakeel Coronel MD fp sc [address] Maintenance   Topic Date Due    COVID-19 Vaccine (1) Never done    Hepatitis C screen  Never done    Shingles vaccine (1 of 2) Never done    Pneumococcal 0-64 years Vaccine (2 - PCV) 08/13/2019    Colorectal Cancer Screen  08/30/2019    Lipids  01/24/2020    A1C test (Diabetic or Prediabetic)  08/01/2020    Cervical cancer screen  08/23/2021    Flu vaccine (1) 09/01/2022    Depression Monitoring  03/22/2023    Breast cancer screen  03/30/2024    DTaP/Tdap/Td vaccine (2 - Td or Tdap) 10/14/2027    HIV screen  Completed    Hepatitis A vaccine  Aged Out    Hepatitis B vaccine  Aged Out    Hib vaccine  Aged Out    Meningococcal (ACWY) vaccine  Aged Out       Hemoglobin A1C (%)   Date Value   08/01/2019 5.6   01/24/2019 5.8   08/23/2018 5.8             ( goal A1C is < 7)   No results found for: LABMICR  LDL Cholesterol (mg/dL)   Date Value   01/24/2019 151 (H)   08/08/2018 224 (H)       (goal LDL is <100)   AST (U/L)   Date Value   03/22/2022 36 (H)     ALT (U/L)   Date Value   03/22/2022 45 (H)     BUN (mg/dL)   Date Value   03/22/2022 15     BP Readings from Last 3 Encounters:   03/22/22 128/66   10/09/21 (!) 109/58   10/09/20 127/70          (goal 120/80)    All Future Testing planned in [facility]  Lab Frequency Next Occurrence   Urinalysis With Microscopic Once 08/18/2022   Cologuard (For External Results Only) Once 08/18/2022   Hemoglobin A1C Once 04/26/2022   Lipid Panel Once 04/26/2022   Hepatitis C Antibody Once 01/26/2023   Cologuard Once 04/22/2022 Patient Active Problem List:     IBS (irritable bowel syndrome)     Depression     Proteinuria     CKD (chronic kidney disease) stage 1, GFR 90 ml/min or greater     Mixed hyperlipidemia     Thyroid nodule     Chronic obstructive pulmonary disease (HCC)     Family history of colon cancer     Personal history of tobacco use     Calcification of right breast     Breast calcification, left     Incomplete rectal prolapse-Congenital defect.  Surgery at 5 yo     Prediabetes     Acute pain of left knee     Intramural leiomyoma of uterus     PMB (postmenopausal bleeding)     Adrenal adenoma     Endometrial thickening on ultrasound     Adrenal nodule (HCC)     Pain of left lower extremity     Instability of left knee joint     Imperforate anus     Incontinence of feces     Atrial fibrillation with RVR (HCC)     Primary hypertension

## 2022-07-06 ENCOUNTER — TELEMEDICINE (OUTPATIENT)
Dept: FAMILY MEDICINE CLINIC | Age: 57
End: 2022-07-06
Payer: MEDICARE

## 2022-07-06 DIAGNOSIS — R71.8 ELEVATED HEMATOCRIT: ICD-10-CM

## 2022-07-06 DIAGNOSIS — R73.03 PREDIABETES: ICD-10-CM

## 2022-07-06 DIAGNOSIS — F32.5 MAJOR DEPRESSIVE DISORDER WITH SINGLE EPISODE, IN FULL REMISSION (HCC): ICD-10-CM

## 2022-07-06 DIAGNOSIS — E04.1 THYROID NODULE: ICD-10-CM

## 2022-07-06 DIAGNOSIS — Z23 NEED FOR PROPHYLACTIC VACCINATION AND INOCULATION AGAINST VARICELLA: ICD-10-CM

## 2022-07-06 DIAGNOSIS — I10 PRIMARY HYPERTENSION: Primary | ICD-10-CM

## 2022-07-06 DIAGNOSIS — R74.8 ELEVATED LIVER ENZYMES: ICD-10-CM

## 2022-07-06 DIAGNOSIS — I48.21 PERMANENT ATRIAL FIBRILLATION (HCC): ICD-10-CM

## 2022-07-06 DIAGNOSIS — Z11.59 ENCOUNTER FOR SCREENING FOR OTHER VIRAL DISEASES: ICD-10-CM

## 2022-07-06 DIAGNOSIS — J41.0 SIMPLE CHRONIC BRONCHITIS (HCC): ICD-10-CM

## 2022-07-06 DIAGNOSIS — E78.2 MIXED HYPERLIPIDEMIA: ICD-10-CM

## 2022-07-06 PROCEDURE — 99214 OFFICE O/P EST MOD 30 MIN: CPT | Performed by: FAMILY MEDICINE

## 2022-07-06 PROCEDURE — 3017F COLORECTAL CA SCREEN DOC REV: CPT | Performed by: FAMILY MEDICINE

## 2022-07-06 PROCEDURE — G8427 DOCREV CUR MEDS BY ELIG CLIN: HCPCS | Performed by: FAMILY MEDICINE

## 2022-07-06 ASSESSMENT — ENCOUNTER SYMPTOMS
CONSTIPATION: 0
RECTAL PAIN: 0
TROUBLE SWALLOWING: 0
STRIDOR: 0
VOMITING: 0
DIARRHEA: 0
CHEST TIGHTNESS: 0
SHORTNESS OF BREATH: 1
SORE THROAT: 0
EYE REDNESS: 0
NAUSEA: 0
COLOR CHANGE: 0
VOICE CHANGE: 0
BLOOD IN STOOL: 0
COUGH: 0
WHEEZING: 0
ABDOMINAL PAIN: 0
SINUS PRESSURE: 0
ABDOMINAL DISTENTION: 0
RHINORRHEA: 0
BACK PAIN: 1

## 2022-07-06 NOTE — PATIENT INSTRUCTIONS
New Updates for [id number] [place] [facility] KHLOE    Thank you for choosing US to give you the best care! [employer] [facility]) is always trying to think of new ways to help their patients. We are asking all patients to try out the new digital registration that is now available through your [facility] account or the new KHLOE, [place] [facility] Via the khloe you're now able to update your personal and registration information prior to your upcoming appointment. This will save you time once you arrive at the office to check-in, not to mention your information remains safe!! Many other perks come from signing up for an account, such as:   Requesting refills   Scheduling an appointment   Completing an [address] Sending a message to the office/provider   Having access to your medication list   Paying your bill/copay prior to your appointment   Scheduling your yearly mammogram   Review your test results    If you are not familiar with [facility] or the [place] [facility] KHLOE, please ask one of us and we will be happy to answer any questions or help you set-up your account.       Your [id number] [employer] office,  [place]

## 2022-07-06 NOTE — PROGRESS NOTES
2022    TELEHEALTH EVALUATION -- Audio/Visual (During BPTTC-37 public health emergency)      Veronica Rosales (:  1965) is a 56 y.o. female,Established patient, here for evaluation of the following chief complaint(s): Hypertension        ASSESSMENT/PLAN:    1. Primary hypertension  Fairly stable on previous readings follows with cardiologist.  Continue metoprolol  -     CBC with Auto Differential; Future  2. Permanent atrial fibrillation (HCC)  Heart rate is under control, continue beta-blockers continue Eliquis  -     CBC with Auto Differential; Future  3. Prediabetes  Recheck A1c, continue on diet control  -     Hemoglobin A1C; Future  4. Mixed hyperlipidemia  Continue statins recheck lipid panel  -     Lipid Panel; Future  5. Thyroid nodule  Stable recheck TSH  -     TSH; Future  6. Major depressive disorder with single episode, in full remission ([facility] [address]  Stable continue current treatment. 7. Simple chronic bronchitis ([facility] [address]  Chronic problem fairly stable continue inhalers, work on smoking    9. Elevated liver enzymes  -     Magnesium; Future  -     US LIVER; Future  10. Elevated hematocrit  -     Magnesium; Future  -     Vitamin D 25 Hydroxy; Future  -     Vitamin B12 & Folate; Future  -     Uric Acid; Future  -     TSH; Future  -     Path Review, Smear; Future  -     Iron and TIBC; Future  -     Lactate Dehydrogenase; Future  -     Jak2 Gene Mutation, Quantitative; Future  11. Encounter for screening for other viral diseases  -     Hepatitis C Antibody; Future  -     Hepatitis B Surface Antibody; Future      Kamran Moss received counseling on the following healthy behaviors: nutrition, exercise and medication adherence  Reviewed prior labs and health maintenance  Discussed use, benefit, and side effects of prescribed medications. Barriers to medication compliance addressed. Patient given educational materials - see patient instructions  All patient questions answered.   Patient voiced understanding. The patient's past medical,surgical, social, and family history as well as her current medications and allergies were reviewed as documented in today's encounter. Medications, labs, diagnostic studies, consultations and follow-up as documented in this encounter. Return in about 6 weeks (around 2022) for lab work in office . Data Unavailable    No future appointments. SUBJECTIVE/OBJECTIVE:  Veronica Luz (:  1965) has requested an audio/video evaluation for the following concern(s):Hypertension    Patient is scheduled for follow-up on chronic medical conditions and to discuss blood work. Hypertension usually controlled, patient does not monitor blood pressure reports insurance did not cover blood pressure monitor. History of atrial fibrillation follows with cardiologist heart rate is under control. Denies any palpitation dyspnea on exertion chest pain. [id number] Score for Atrial Fibrillation Stroke Risk   Risk   Factors  Component Value   C CHF No 0   H HTN Yes 1   A2 Age >= 76 No,  (56 y.o.) 0   D DM No 0   S2 Prior Stroke/TIA No 0   V Vascular Disease No 0   A Age 74-69 No,  (56 y.o.) 0   Sc Sex female 1    [id number]  Score  2   Score last updated 22 98:10 PM EDT    Click here for a link to the UpToDate guideline \"Atrial Fibrillation: Anticoagulation therapy to prevent embolization    Disclaimer: Risk Score calculation is dependent on accuracy of patient problem list and past encounter diagnosis. Prediabetes patient is on diet control. Patient has not done blood work which was ordered. She had elevated hematocrit which is mildly improved from previous. Patient is a chronic smoker needs further evaluation. Patient reports she has cut back on smoking. Patient also has elevated liver enzymes denies any drinking alcohol, needs ultrasound. Alkaline phosphatase is high. Further blood work ordered.       Hyperlipidemia on statins no recent weakness, light-headedness, numbness and headaches. Psychiatric/Behavioral: Positive for decreased concentration. Negative for agitation, behavioral problems, dysphoric mood, hallucinations, sleep disturbance and suicidal ideas. The patient is nervous/anxious. Prior to Visit Medications    Medication Sig Taking? Authorizing Provider   ELIQUIS 5 MG TABS tablet TAKE 1 TABLET BY MOUTH TWICE DAILY Yes Shakeel Coronel MD   tiotropium (SPIRIVA RESPIMAT) 1.25 MCG/ACT AERS inhaler INHALE 2 PUFFS BY MOUTH EVERY DAY Yes Shakeel Coronel MD   famotidine (PEPCID) 20 MG tablet TAKE 1 TABLET BY MOUTH TWICE DAILY Yes Shakeel Coronel MD   atorvastatin (LIPITOR) 40 MG tablet TAKE 1 TABLET BY MOUTH DAILY Yes Shakeel Coronel MD   VENTOLIN  (90 Base) MCG/ACT inhaler INHALE 2 PUFFS INTO THE LUNGS EVERY 6 HOURS AS NEEDED FOR WHEEZING Yes Shakeel Coronel MD   flecainide (TAMBOCOR) 50 MG tablet  Yes Historical Provider, MD   nitroGLYCERIN (NITROSTAT) 0.4 MG SL tablet Place 1 tablet under the tongue every 5 minutes as needed for Chest pain up to max of 3 total doses. If no relief after 1 dose, call 911. Yes Shakeel Coronel MD   Blood Pressure KIT Dx: HTN. Needs automatic blood pressure machine to monitor her blood pressure.  Yes Shakeel Coronel MD   metoprolol succinate (TOPROL XL) 25 MG extended release tablet Take 1 tablet by mouth daily Yes Shakeel Coronel MD   Elastic Bandages & Supports (KNEE BRACE/HINGED/LARGE) MISC Use daily for knee pain Yes Shakeel Coronel MD   Blood Pressure Monitor KIT TAKE BLOOD PRESSURE DAILY Yes Peri Boast, MD   nicotine (Max Meeter) 14 MG/24HR Place 1 patch onto the skin daily  Shakeel Coronel MD       Social History     Tobacco Use    Smoking status: Current Every Day Smoker     Packs/day: 0.50     Years: 28.00     Pack years: 14.00     Types: Cigarettes    Smokeless tobacco: Never Used    Tobacco comment: down to 5 cigs per day   Vaping Use    Vaping Use: Never used   Substance Use Topics    Alcohol use: No     Alcohol/week: 0.0 standard drinks    Drug use: No          PHYSICAL EXAMINATION:    Vital Signs: (As obtained by patient/caregiver or practitioner observation)  -vital signs stable and within normal limits except   Patient-Reported Vitals 10/26/2021   Patient-Reported Weight 151   Patient-Reported Height 5 5   Patient-Reported Temperature -           Intensity of pain is:2       Constitutional: [x] Appears well-developed and well-nourished [x] No apparent distress      [] Abnormal-       Mental status  [x] Alert and awake  [x] Oriented to person/place/time [x]Able to follow commands      Eyes:  EOM    [x]  Normal  [] Abnormal-  Sclera  [x]  Normal  [] Abnormal -         Discharge [x]  None visible  [] Abnormal -    HENT:   [x] Normocephalic, atraumatic. [] Abnormal   [x] Mouth/Throat: Mucous membranes are moist.     External Ears [x] Normal  [] Abnormal-     Neck: [x] No visualized mass     Pulmonary/Chest: [x] Respiratory effort normal.  [x] No visualized signs of difficulty breathing or respiratory distress        [] Abnormal        Musculoskeletal:   [x] Normal gait with no signs of ataxia         [x] Normal range of motion of neck        [] Abnormal-       Neurological:        [x] No Facial Asymmetry (Cranial nerve 7 motor function) (limited exam to video visit)          [x] No gaze palsy        [] Abnormal-            Skin:        [x] No significant exanthematous lesions or discoloration noted on facial skin         [] Abnormal-            Psychiatric:      [x] No Hallucinations       [x]Mood is normal      [x]Behavior is normal      [x]Judgment is normal      [x]Thought content is normal       [] Abnormal-     Other pertinent observable physical exam findings-    Due to this being a TeleHealth encounter, evaluation of the following organ systems is limited: Vitals/Constitutional/EENT/Resp/CV/GI//MS/Neuro/Skin/Heme-Lymph-Imm.     I personally reviewed most recent labs reviewed with the patient and all questions fully answered. Otherwise labs within normal limits        Lab Results   Component Value Date    WBC 10.5 03/22/2022    HGB 16.1 (H) 03/22/2022    HCT 47.5 (H) 03/22/2022    MCV 87.4 03/22/2022     (H) 03/22/2022       Lab Results   Component Value Date/Time     03/22/2022 12:56 PM    K 4.8 03/22/2022 12:56 PM     03/22/2022 12:56 PM    CO2 26 03/22/2022 12:56 PM    BUN 15 03/22/2022 12:56 PM    CREATININE 0.91 03/22/2022 12:56 PM    GLUCOSE 100 03/22/2022 12:56 PM    CALCIUM 9.5 03/22/2022 12:56 PM        Lab Results   Component Value Date    ALT 45 (H) 03/22/2022    AST 36 (H) 03/22/2022    ALKPHOS 122 (H) 03/22/2022    BILITOT 0.40 03/22/2022       Lab Results   Component Value Date    TSH 0.52 10/07/2021       Lab Results   Component Value Date    CHOL 210 (H) 01/24/2019    CHOL 303 (H) 08/08/2018    CHOL 400 (H) 06/23/2016     Lab Results   Component Value Date    TRIG 99 01/24/2019    TRIG 162 (H) 08/08/2018    TRIG 252 (H) 06/23/2016     Lab Results   Component Value Date    HDL 39 (L) 01/24/2019    HDL 47 08/08/2018    HDL 50 06/23/2016     Lab Results   Component Value Date    LDLCHOLESTEROL 151 (H) 01/24/2019    LDLCHOLESTEROL 224 (H) 08/08/2018    LDLCHOLESTEROL 300 (H) 06/23/2016       Lab Results   Component Value Date    CHOLHDLRATIO 5.4 (H) 01/24/2019    CHOLHDLRATIO 6.4 (H) 08/08/2018    CHOLHDLRATIO 8.0 (H) 06/23/2016       Lab Results   Component Value Date    LABA1C 5.6 08/01/2019    LABA1C 5.8 01/24/2019    LABA1C 5.8 08/23/2018         No results found for: MLCUHXMC88    No results found for: VITD25    No orders of the defined types were placed in this encounter. Orders Placed This Encounter   Procedures    US LIVER     This procedure can be scheduled via [facility]. Access your [facility] account by visiting [email or web address].      Standing Status:   Future     Standing Expiration Date:   7/6/2023     Order Specific Question:   Reason for exam:     Answer:   elevated liver enzymes    CBC with Auto Differential     Standing Status:   Future     Standing Expiration Date:   7/7/2023    Hemoglobin A1C     Standing Status:   Future     Standing Expiration Date:   7/6/2023    Lipid Panel     Standing Status:   Future     Standing Expiration Date:   7/6/2023     Order Specific Question:   Is Patient Fasting?/# of Hours     Answer:   yes, 8-10 hours    Hepatitis C Antibody     Standing Status:   Future     Standing Expiration Date:   7/6/2023    Hepatitis B Surface Antibody     Standing Status:   Future     Standing Expiration Date:   7/6/2023    Magnesium     Standing Status:   Future     Standing Expiration Date:   7/6/2023    Vitamin D 25 Hydroxy     Standing Status:   Future     Standing Expiration Date:   7/6/2023    Vitamin B12 & Folate     Standing Status:   Future     Standing Expiration Date:   7/6/2023    Uric Acid     Standing Status:   Future     Standing Expiration Date:   7/6/2023    TSH     Standing Status:   Future     Standing Expiration Date:   7/6/2023    Path Review, Smear     Standing Status:   Future     Standing Expiration Date:   7/6/2023    Iron and TIBC     Standing Status:   Future     Standing Expiration Date:   7/6/2023     Order Specific Question:   Is Patient Fasting? Answer:   yes     Order Specific Question:   No of Hours? Answer:   8    Lactate Dehydrogenase     Standing Status:   Future     Standing Expiration Date:   7/6/2023    Jak2 Gene Mutation, Quantitative     Standing Status:   Future     Standing Expiration Date:   7/6/2023       There are no discontinued medications. Veronica Hudson, was evaluated through a synchronous (real-time) audio-video encounter. The patient (or guardian if applicable) is aware that this is a billable service, which includes applicable co-pays. The virtual visit was conducted with patient's (and/or legal guardian consent).      Verbal consent to proceed has been obtained within the past 12 months. The visit was conducted pursuant to the emergency declaration under the [facility] [address] and the [employer] General Act. Patient identification was verified    Patient was alone yes  Provider was located at primary practice location. The patient was located at home, in a state where the provider was licensed to provide care. On this date 7/6/2022 I have spent 35 minutes reviewing previous notes, test results and face to face with the patient discussing the diagnosis and importance of compliance with the treatment plan as well as documenting on the day of the visit. --Malcolm Chavez MD on 7/6/2022 at 12:12 PM    An electronic signature was used to authenticate this note.

## 2022-07-07 ENCOUNTER — HOSPITAL ENCOUNTER (OUTPATIENT)
Age: 57
Discharge: HOME | End: 2022-07-07
Payer: MEDICARE

## 2022-07-07 DIAGNOSIS — Z11.59 ENCOUNTER FOR SCREENING FOR OTHER VIRAL DISEASES: ICD-10-CM

## 2022-07-07 DIAGNOSIS — E04.1 THYROID NODULE: ICD-10-CM

## 2022-07-07 DIAGNOSIS — R71.8 ELEVATED HEMATOCRIT: ICD-10-CM

## 2022-07-07 DIAGNOSIS — I10 PRIMARY HYPERTENSION: ICD-10-CM

## 2022-07-07 DIAGNOSIS — I48.21 PERMANENT ATRIAL FIBRILLATION (HCC): ICD-10-CM

## 2022-07-07 DIAGNOSIS — R74.8 ELEVATED LIVER ENZYMES: ICD-10-CM

## 2022-07-07 DIAGNOSIS — E78.2 MIXED HYPERLIPIDEMIA: ICD-10-CM

## 2022-07-07 DIAGNOSIS — R73.03 PREDIABETES: ICD-10-CM

## 2022-07-07 LAB
ABSOLUTE EOS #: 0.3 K/UL (ref 0–0.4)
ABSOLUTE LYMPH #: 2.8 K/UL (ref 1–4.8)
ABSOLUTE MONO #: 0.5 K/UL (ref 0.1–1.3)
BASOPHILS # BLD: 1 % (ref 0–2)
BASOPHILS ABSOLUTE: 0.1 K/UL (ref 0–0.2)
CHOLESTEROL/HDL RATIO: 3.9
CHOLESTEROL: 194 MG/DL
EOSINOPHILS RELATIVE PERCENT: 2 % (ref 0–4)
FOLATE: 4.7 NG/ML
HBV SURFACE AB TITR SER: <3.5 MIU/ML
HCT VFR BLD CALC: 49.5 % (ref 36–46)
HDLC SERPL-MCNC: 50 MG/DL
HEMOGLOBIN: 17 G/DL (ref 12–16)
HEPATITIS C ANTIBODY: NONREACTIVE
IRON SATURATION: 28 % (ref 20–55)
IRON: 83 UG/DL (ref 37–145)
LACTATE DEHYDROGENASE: 250 U/L (ref 135–214)
LDL CHOLESTEROL: 126 MG/DL (ref 0–130)
LYMPHOCYTES # BLD: 28 % (ref 24–44)
MAGNESIUM: 2.1 MG/DL (ref 1.6–2.6)
MCH RBC QN AUTO: 30.6 PG (ref 26–34)
MCHC RBC AUTO-ENTMCNC: 34.2 G/DL (ref 31–37)
MCV RBC AUTO: 89.5 FL (ref 80–100)
MONOCYTES # BLD: 5 % (ref 1–7)
PDW BLD-RTO: 15.7 % (ref 11.5–14.9)
PLATELET # BLD: 601 K/UL (ref 150–450)
PMV BLD AUTO: 7.7 FL (ref 6–12)
RBC # BLD: 5.54 M/UL (ref 4–5.2)
SEG NEUTROPHILS: 64 % (ref 36–66)
SEGMENTED NEUTROPHILS ABSOLUTE COUNT: 6.5 K/UL (ref 1.3–9.1)
TOTAL IRON BINDING CAPACITY: 301 UG/DL (ref 250–450)
TRIGL SERPL-MCNC: 90 MG/DL
TSH SERPL DL<=0.05 MIU/L-ACNC: 0.61 UIU/ML (ref 0.3–5)
UNSATURATED IRON BINDING CAPACITY: 218 UG/DL (ref 112–347)
URIC ACID: 4.2 MG/DL (ref 2.4–5.7)
VITAMIN B-12: 772 PG/ML (ref 232–1245)
VITAMIN D 25-HYDROXY: 23.9 NG/ML
WBC # BLD: 10.2 K/UL (ref 3.5–11)

## 2022-07-07 PROCEDURE — 36415 COLL VENOUS BLD VENIPUNCTURE: CPT

## 2022-07-07 PROCEDURE — 81270 JAK2 GENE: CPT

## 2022-07-07 PROCEDURE — 82607 VITAMIN B-12: CPT

## 2022-07-07 PROCEDURE — 83615 LACTATE (LD) (LDH) ENZYME: CPT

## 2022-07-07 PROCEDURE — 83550 IRON BINDING TEST: CPT

## 2022-07-07 PROCEDURE — 86803 HEPATITIS C AB TEST: CPT

## 2022-07-07 PROCEDURE — 83540 ASSAY OF IRON: CPT

## 2022-07-07 PROCEDURE — 84443 ASSAY THYROID STIM HORMONE: CPT

## 2022-07-07 PROCEDURE — 82306 VITAMIN D 25 HYDROXY: CPT

## 2022-07-07 PROCEDURE — 85025 COMPLETE CBC W/AUTO DIFF WBC: CPT

## 2022-07-07 PROCEDURE — 84550 ASSAY OF BLOOD/URIC ACID: CPT

## 2022-07-07 PROCEDURE — 83036 HEMOGLOBIN GLYCOSYLATED A1C: CPT

## 2022-07-07 PROCEDURE — 80061 LIPID PANEL: CPT

## 2022-07-07 PROCEDURE — 82746 ASSAY OF FOLIC ACID SERUM: CPT

## 2022-07-07 PROCEDURE — 83735 ASSAY OF MAGNESIUM: CPT

## 2022-07-07 PROCEDURE — 86317 IMMUNOASSAY INFECTIOUS AGENT: CPT

## 2022-07-08 LAB
ESTIMATED AVERAGE GLUCOSE: 131 MG/DL
HBA1C MFR BLD: 6.2 % (ref 4–6)

## 2022-07-12 DIAGNOSIS — R73.03 PREDIABETES: Primary | ICD-10-CM

## 2022-07-12 DIAGNOSIS — E55.9 VITAMIN D DEFICIENCY: ICD-10-CM

## 2022-07-12 RX ORDER — ERGOCALCIFEROL 1.25 MG/1
50000 CAPSULE ORAL WEEKLY
Qty: 12 CAPSULE | Refills: 1 | Status: SHIPPED | OUTPATIENT
Start: 2022-07-12

## 2022-07-12 RX ORDER — METFORMIN HYDROCHLORIDE 500 MG/1
500 TABLET, EXTENDED RELEASE ORAL
Qty: 30 TABLET | Refills: 3 | Status: SHIPPED | OUTPATIENT
Start: 2022-07-12

## 2022-07-13 LAB
JAK2 QNT, SOURCE: ABNORMAL
V617 MUTATION, PERCENT: 11.2 %
V617F MUTATION, QNT: DETECTED

## 2022-07-14 DIAGNOSIS — Z15.89 JAK-2 GENE MUTATION: Primary | ICD-10-CM

## 2022-07-20 ENCOUNTER — HOSPITAL ENCOUNTER (OUTPATIENT)
Dept: ULTRASOUND IMAGING | Age: 57
Discharge: HOME | End: 2022-07-22
Payer: MEDICARE

## 2022-07-20 DIAGNOSIS — R74.8 ELEVATED LIVER ENZYMES: ICD-10-CM

## 2022-07-20 PROCEDURE — 76705 ECHO EXAM OF ABDOMEN: CPT

## 2022-07-26 ENCOUNTER — TELEPHONE (OUTPATIENT)
Dept: INTERVENTIONAL RADIOLOGY/VASCULAR | Age: 57
End: 2022-07-26

## 2022-07-26 ENCOUNTER — INITIAL CONSULT (OUTPATIENT)
Dept: ONCOLOGY | Age: 57
End: 2022-07-26
Payer: MEDICARE

## 2022-07-26 ENCOUNTER — HOSPITAL ENCOUNTER (OUTPATIENT)
Age: 57
Discharge: HOME | End: 2022-07-26
Payer: MEDICARE

## 2022-07-26 ENCOUNTER — TELEPHONE (OUTPATIENT)
Dept: ONCOLOGY | Age: 57
End: 2022-07-26

## 2022-07-26 VITALS
DIASTOLIC BLOOD PRESSURE: 77 MMHG | HEART RATE: 73 BPM | TEMPERATURE: 96 F | SYSTOLIC BLOOD PRESSURE: 135 MMHG | WEIGHT: 149.2 LBS | BODY MASS INDEX: 24.83 KG/M2

## 2022-07-26 DIAGNOSIS — D72.9 NEUTROPHILIA: ICD-10-CM

## 2022-07-26 DIAGNOSIS — D45 POLYCYTHEMIA VERA (HCC): ICD-10-CM

## 2022-07-26 DIAGNOSIS — D75.839 THROMBOCYTHEMIA: ICD-10-CM

## 2022-07-26 DIAGNOSIS — D75.839 THROMBOCYTHEMIA: Primary | ICD-10-CM

## 2022-07-26 PROBLEM — D72.828 NEUTROPHILIA: Status: ACTIVE | Noted: 2022-07-26

## 2022-07-26 LAB
ABSOLUTE EOS #: 0.3 K/UL (ref 0–0.4)
ABSOLUTE LYMPH #: 3 K/UL (ref 1–4.8)
ABSOLUTE MONO #: 0.6 K/UL (ref 0.1–1.3)
ABSOLUTE RETIC #: 0.09 M/UL (ref 0.02–0.1)
BASOPHILS # BLD: 1 % (ref 0–2)
BASOPHILS ABSOLUTE: 0.1 K/UL (ref 0–0.2)
EOSINOPHILS RELATIVE PERCENT: 2 % (ref 0–4)
FORWARDED TO:: NORMAL
HCT VFR BLD CALC: 47.4 % (ref 36–46)
HEMOGLOBIN: 16.1 G/DL (ref 12–16)
LYMPHOCYTES # BLD: 26 % (ref 24–44)
MCH RBC QN AUTO: 30 PG (ref 26–34)
MCHC RBC AUTO-ENTMCNC: 33.9 G/DL (ref 31–37)
MCV RBC AUTO: 88.5 FL (ref 80–100)
MONOCYTES # BLD: 5 % (ref 1–7)
PDW BLD-RTO: 15 % (ref 11.5–14.9)
PLATELET # BLD: 592 K/UL (ref 150–450)
PMV BLD AUTO: 8.3 FL (ref 6–12)
RBC # BLD: 5.36 M/UL (ref 4–5.2)
RETIC %: 1.7 % (ref 0.5–2)
SEG NEUTROPHILS: 66 % (ref 36–66)
SEGMENTED NEUTROPHILS ABSOLUTE COUNT: 7.5 K/UL (ref 1.3–9.1)
WBC # BLD: 11.5 K/UL (ref 3.5–11)

## 2022-07-26 PROCEDURE — 3017F COLORECTAL CA SCREEN DOC REV: CPT | Performed by: INTERNAL MEDICINE

## 2022-07-26 PROCEDURE — 85025 COMPLETE CBC W/AUTO DIFF WBC: CPT

## 2022-07-26 PROCEDURE — 36415 COLL VENOUS BLD VENIPUNCTURE: CPT

## 2022-07-26 PROCEDURE — G8427 DOCREV CUR MEDS BY ELIG CLIN: HCPCS | Performed by: INTERNAL MEDICINE

## 2022-07-26 PROCEDURE — 36415 COLL VENOUS BLD VENIPUNCTURE: CPT | Performed by: INTERNAL MEDICINE

## 2022-07-26 PROCEDURE — 99205 OFFICE O/P NEW HI 60 MIN: CPT | Performed by: INTERNAL MEDICINE

## 2022-07-26 PROCEDURE — G8420 CALC BMI NORM PARAMETERS: HCPCS | Performed by: INTERNAL MEDICINE

## 2022-07-26 PROCEDURE — 4004F PT TOBACCO SCREEN RCVD TLK: CPT | Performed by: INTERNAL MEDICINE

## 2022-07-26 PROCEDURE — 85045 AUTOMATED RETICULOCYTE COUNT: CPT

## 2022-07-26 NOTE — PROGRESS NOTES
Patient ID: Eric Lopez, 1965, [id number], 56 y.o. Referred by :   Ardia Gottron, MD   Reason for consultation: JAK2 mutation positive      HISTORY OF PRESENT ILLNESS:    Oncologic History:    Eric Lopez is a very pleasant 56 y.o. female was referred for high hemoglobin hematocrit and positive JAK2 mutation also had platelet elevated at 601 WBC fluctuating up and down, reviewing labs for the last 3 years she had high hematocrit since at least 2019 with rising platelet count and fluctuating neutrophilia, patient denied history of stroke or thrombosis or myocardial infarction,she had diabetes and she is on nitroglycerin, she does have history of V. tach and stress test was negative and on Eliquis for A. fib    Past Medical History:   Diagnosis Date    Chronic kidney disease     CKD (chronic kidney disease) stage 1, GFR 90 ml/min or greater     COPD (chronic obstructive pulmonary disease) ([facility] [address]     Depression     Hyperlipidemia     Hypocalcemia     IBS (irritable bowel syndrome)     [facility] 2022    Primary hypertension 3/22/2022    Proteinuria        Past Surgical History:   Procedure Laterality Date    BREAST BIOPSY       SECTION      CS x 2    COLONOSCOPY      TUBAL LIGATION Bilateral        No Known Allergies    Current Outpatient Medications   Medication Sig Dispense Refill    vitamin D (ERGOCALCIFEROL) 1.25 MG (58932 UT) CAPS capsule Take 1 capsule by mouth once a week 12 capsule 1    metFORMIN (GLUCOPHAGE-XR) 500 MG extended release tablet Take 1 tablet by mouth daily (with breakfast) 30 tablet 3    ELIQUIS 5 MG TABS tablet TAKE 1 TABLET BY MOUTH TWICE DAILY 60 tablet 3    tiotropium (SPIRIVA RESPIMAT) 1.25 MCG/ACT AERS inhaler INHALE 2 PUFFS BY MOUTH EVERY DAY 4 g 2    famotidine (PEPCID) 20 MG tablet TAKE 1 TABLET BY MOUTH TWICE DAILY 60 tablet 3    atorvastatin (LIPITOR) 40 MG tablet TAKE 1 TABLET BY MOUTH DAILY 30 tablet 1    VENTOLIN  (90 Base) MCG/ACT inhaler INHALE 2 PUFFS INTO THE LUNGS EVERY 6 HOURS AS NEEDED FOR WHEEZING 18 g 0    flecainide (TAMBOCOR) 50 MG tablet       nitroGLYCERIN (NITROSTAT) 0.4 MG SL tablet Place 1 tablet under the tongue every 5 minutes as needed for Chest pain up to max of 3 total doses. If no relief after 1 dose, call 911. 25 tablet 3    Blood Pressure KIT Dx: HTN. Needs automatic blood pressure machine to monitor her blood pressure. 1 kit 0    metoprolol succinate (TOPROL XL) 25 MG extended release tablet Take 1 tablet by mouth daily 30 tablet 3    Elastic Bandages & Supports (KNEE BRACE/HINGED/LARGE) MISC Use daily for knee pain 1 each 0    nicotine (NICODERM CQ) 14 MG/24HR Place 1 patch onto the skin daily 42 patch 2    Blood Pressure Monitor KIT TAKE BLOOD PRESSURE DAILY (Patient not taking: Reported on 7/26/2022) 1 kit 0     No current facility-administered medications for this visit.        Social History     Socioeconomic History    Marital status: Single     Spouse name: Not on file    Number of children: Not on file    Years of education: Not on file    Highest education level: Not on file   Occupational History    Not on file   Tobacco Use    Smoking status: Every Day     Packs/day: 0.50     Years: 28.00     Pack years: 14.00     Types: Cigarettes    Smokeless tobacco: Never    Tobacco comments:     down to 5 cigs per day   Vaping Use    Vaping Use: Never used   Substance and Sexual Activity    Alcohol use: No     Alcohol/week: 0.0 standard drinks    Drug use: No    Sexual activity: Not on file   Other Topics Concern    Not on file   Social History Narrative    Not on file     Social Determinants of Health     Financial Resource Strain: High Risk    Difficulty of Paying Living Expenses: Hard   Food Insecurity: Food Insecurity Present    Worried About Running Out of Food in the Last Year: Often true    Ran Out of Food in the Last Year: Often true   Transportation Needs: Not on file   Physical Activity: Not on file Stress: Not on file   Social Connections: Not on file   Intimate Partner Violence: Not on file   Housing Stability: Not on file       Family History   Problem Relation Age of Onset    Asthma Father     Cancer Father         colon    Diabetes Father     COPD Father     Heart Failure Father     Seizures Mother         REVIEW OF SYSTEM:     Constitutional: No fever or chills. No night sweats, no weight loss   Eyes: No eye discharge, double vision, or eye pain   HEENT: negative for sore mouth, sore throat, hoarseness and voice change   Respiratory: negative for cough , sputum, dyspnea, wheezing, hemoptysis, chest pain   Cardiovascular: negative for chest pain, dyspnea, palpitations, orthopnea, PND   Gastrointestinal: negative for nausea, vomiting, diarrhea, constipation, abdominal pain, Dysphagia, hematemesis and hematochezia   Genitourinary: negative for frequency, dysuria, nocturia, urinary incontinence, and hematuria   Integument: negative for rash, skin lesions, bruises.    Hematologic/Lymphatic: negative for easy bruising, bleeding, lymphadenopathy, petechiae and swelling/edema   Endocrine: negative for heat or cold intolerance, tremor, weight changes, change in bowel habits and hair loss   Musculoskeletal: negative for myalgias, arthralgias, pain, joint swelling,and bone pain   Neurological: negative for headaches, dizziness, seizures, weakness, numbness       OBJECTIVE:         Vitals:    07/26/22 0921   BP: 135/77   Pulse: 73   Temp: (!) 96 °F (35.6 °C)       PHYSICAL EXAM:   General appearance - well appearing, no in pain or distress   Mental status - alert and cooperative   Eyes - pupils equal and reactive, extraocular eye movements intact   Ears - bilateral TM's and external ear canals normal   Mouth - mucous membranes moist, pharynx normal without lesions   Neck - supple, no significant adenopathy   Lymphatics - no palpable lymphadenopathy, no hepatosplenomegaly   Chest - clear to auscultation, no wheezes, rales or rhonchi, symmetric air entry   Heart - normal rate, regular rhythm, normal S1, S2, no murmurs, rubs, clicks or gallops   Abdomen - soft, nontender, nondistended, no masses or organomegaly   Neurological - alert, oriented, normal speech, no focal findings or movement disorder noted   Musculoskeletal - no joint tenderness, deformity or swelling   Extremities - peripheral pulses normal, no pedal edema, no clubbing or cyanosis   Skin - normal coloration and turgor, no rashes, no suspicious skin lesions noted ,      LABORATORY DATA:     Lab Results   Component Value Date    WBC 10.2 07/07/2022    HGB 17.0 (H) 07/07/2022    HCT 49.5 (H) 07/07/2022    MCV 89.5 07/07/2022     (H) 07/07/2022    LYMPHOPCT 28 07/07/2022    RBC 5.54 (H) 07/07/2022    MCH 30.6 07/07/2022    MCHC 34.2 07/07/2022    RDW 15.7 (H) 07/07/2022    MONOPCT 5 07/07/2022    BASOPCT 1 07/07/2022    NEUTROABS 6.50 07/07/2022    LYMPHSABS 2.80 07/07/2022    MONOSABS 0.50 07/07/2022    EOSABS 0.30 07/07/2022    BASOSABS 0.10 07/07/2022         Chemistry        Component Value Date/Time     03/22/2022 1256    K 4.8 03/22/2022 1256     03/22/2022 1256    CO2 26 03/22/2022 1256    BUN 15 03/22/2022 1256    CREATININE 0.91 (H) 03/22/2022 1256        Component Value Date/Time    CALCIUM 9.5 03/22/2022 1256    ALKPHOS 122 (H) 03/22/2022 1256    AST 36 (H) 03/22/2022 1256    ALT 45 (H) 03/22/2022 1256    BILITOT 0.40 03/22/2022 1256            PATHOLOGY DATA:         IMAGING DATA:      US LIVER  Narrative: EXAMINATION:  RIGHT UPPER QUADRANT ULTRASOUND    7/20/2022 9:57 am    COMPARISON:  None. HISTORY:  ORDERING SYSTEM PROVIDED HISTORY: Elevated liver enzymes  TECHNOLOGIST PROVIDED HISTORY:  This procedure can be scheduled via [facility]. Access your [facility] account by  visiting [email or web address].  elevated liver enzymes    FINDINGS:  LIVER:  Normal hepatic echotexture.   Simple cysts throughout largest right  hepatic lobe 1.8 cm maximum consider aspirin from cardiac and diabetes aspect for prevention however as she is on Eliquis that may increase risk of bleed will defer that to cardiology  Explained to her about cardiovascular risk factor management and stop smoking increase physical activity weight loss and compliant with medication/primary care recommendation about the blood pressure and diabetes and other risk factors      Thank you for the consult     I spent a total of 60 minutes on the date of the service which included preparing to see the patient, face-to-face patient care, completing clinical documentation, obtaining and/or reviewing separately obtained history, performing a medically appropriate examination, counseling and educating the patient/family/caregiver and ordering medications, tests, or procedures. [facility]                            This note is created with the assistance of a speech recognition program.  While intending to generate a document that actually reflects the content of the visit, the document can still have some errors including those of syntax and sound a like substitutions which may escape proof reading. It such instances, actual meaning can be extrapolated by contextual diversion.

## 2022-07-26 NOTE — PATIENT INSTRUCTIONS
Bone marrow biopsy and aspirate  Phlebotomy weekly for 1 unit of blood for hematocrit above 43  RTC after the bone marrow biopsy and aspirate  Site paradigm

## 2022-07-26 NOTE — TELEPHONE ENCOUNTER
Avs from 7/26/22    Bone marrow biopsy and aspirate  Phlebotomy weekly for 1 unit of blood for hematocrit above 43  RTC after the bone marrow biopsy and aspirate  Site paradigm      IR has been notified by email to schedule pt for biopsy   Pt has been scheduled for the phlebotomy on 8/3/22  RV is scheduled for 8/16/22 @ 9:45 Might need to be changed depending on when bone biopsy is done.  Writer will follow   Si Paradigm has been done and sent out MA will follow and update MD with results       PT was given AVS and appt schedule      Electronically signed by Silviano Butler on 7/26/2022 at 11:40 AM

## 2022-07-27 LAB — SURGICAL PATHOLOGY REPORT: NORMAL

## 2022-07-28 LAB — PATHOLOGIST REVIEW: NORMAL

## 2022-07-29 DIAGNOSIS — D45 POLYCYTHEMIA VERA (HCC): Primary | ICD-10-CM

## 2022-08-03 ENCOUNTER — HOSPITAL ENCOUNTER (OUTPATIENT)
Dept: INFUSION THERAPY | Age: 57
Setting detail: INFUSION SERIES
Discharge: HOME | End: 2022-08-03
Payer: MEDICARE

## 2022-08-03 VITALS
OXYGEN SATURATION: 97 % | RESPIRATION RATE: 17 BRPM | SYSTOLIC BLOOD PRESSURE: 111 MMHG | DIASTOLIC BLOOD PRESSURE: 65 MMHG | TEMPERATURE: 97 F | HEART RATE: 52 BPM

## 2022-08-03 DIAGNOSIS — D45 POLYCYTHEMIA VERA (HCC): ICD-10-CM

## 2022-08-03 DIAGNOSIS — D45 POLYCYTHEMIA VERA (HCC): Primary | ICD-10-CM

## 2022-08-03 LAB
ABSOLUTE EOS #: 0.3 K/UL (ref 0–0.4)
ABSOLUTE LYMPH #: 3.4 K/UL (ref 1–4.8)
ABSOLUTE MONO #: 0.6 K/UL (ref 0.1–1.3)
BASOPHILS # BLD: 1 % (ref 0–2)
BASOPHILS ABSOLUTE: 0.1 K/UL (ref 0–0.2)
EOSINOPHILS RELATIVE PERCENT: 2 % (ref 0–4)
HCT VFR BLD CALC: 46.4 % (ref 36–46)
HEMOGLOBIN: 15.9 G/DL (ref 12–16)
LYMPHOCYTES # BLD: 32 % (ref 24–44)
MCH RBC QN AUTO: 30.3 PG (ref 26–34)
MCHC RBC AUTO-ENTMCNC: 34.2 G/DL (ref 31–37)
MCV RBC AUTO: 88.6 FL (ref 80–100)
MONOCYTES # BLD: 6 % (ref 1–7)
PDW BLD-RTO: 15.5 % (ref 11.5–14.9)
PLATELET # BLD: 614 K/UL (ref 150–450)
PMV BLD AUTO: 7.8 FL (ref 6–12)
RBC # BLD: 5.23 M/UL (ref 4–5.2)
SEG NEUTROPHILS: 59 % (ref 36–66)
SEGMENTED NEUTROPHILS ABSOLUTE COUNT: 6.3 K/UL (ref 1.3–9.1)
WBC # BLD: 10.7 K/UL (ref 3.5–11)

## 2022-08-03 PROCEDURE — 99195 PHLEBOTOMY: CPT

## 2022-08-03 PROCEDURE — 85025 COMPLETE CBC W/AUTO DIFF WBC: CPT

## 2022-08-03 PROCEDURE — 36415 COLL VENOUS BLD VENIPUNCTURE: CPT

## 2022-08-03 NOTE — PROGRESS NOTES
Pt arrived for therapeutic phlebotomy. Vitals obtained and labs drawn.  hct= 46.3, therefore procedure indicated per written parameters. 500 mL whole blood removed using ** arm. Pt tolerated well. Monitored post procedure. No s/s adverse reactions noted. Vitals remained stable. Pt discharged home, ambulatory per self.

## 2022-08-04 ENCOUNTER — TELEPHONE (OUTPATIENT)
Dept: INTERVENTIONAL RADIOLOGY/VASCULAR | Age: 57
End: 2022-08-04

## 2022-08-10 ENCOUNTER — HOSPITAL ENCOUNTER (OUTPATIENT)
Dept: INFUSION THERAPY | Age: 57
Setting detail: INFUSION SERIES
Discharge: HOME | End: 2022-08-10
Payer: MEDICARE

## 2022-08-10 VITALS
DIASTOLIC BLOOD PRESSURE: 65 MMHG | SYSTOLIC BLOOD PRESSURE: 121 MMHG | HEART RATE: 57 BPM | TEMPERATURE: 97 F | OXYGEN SATURATION: 95 % | RESPIRATION RATE: 16 BRPM

## 2022-08-10 DIAGNOSIS — D45 POLYCYTHEMIA VERA (HCC): ICD-10-CM

## 2022-08-10 LAB
ABSOLUTE EOS #: 0.4 K/UL (ref 0–0.4)
ABSOLUTE LYMPH #: 4.5 K/UL (ref 1–4.8)
ABSOLUTE MONO #: 0.6 K/UL (ref 0.1–1.3)
BASOPHILS # BLD: 1 % (ref 0–2)
BASOPHILS ABSOLUTE: 0.1 K/UL (ref 0–0.2)
EOSINOPHILS RELATIVE PERCENT: 3 % (ref 0–4)
HCT VFR BLD CALC: 40.1 % (ref 36–46)
HEMOGLOBIN: 13.9 G/DL (ref 12–16)
LYMPHOCYTES # BLD: 34 % (ref 24–44)
MCH RBC QN AUTO: 30.7 PG (ref 26–34)
MCHC RBC AUTO-ENTMCNC: 34.6 G/DL (ref 31–37)
MCV RBC AUTO: 88.9 FL (ref 80–100)
MONOCYTES # BLD: 5 % (ref 1–7)
PDW BLD-RTO: 15.8 % (ref 11.5–14.9)
PLATELET # BLD: 614 K/UL (ref 150–450)
PMV BLD AUTO: 7.7 FL (ref 6–12)
RBC # BLD: 4.51 M/UL (ref 4–5.2)
SEG NEUTROPHILS: 57 % (ref 36–66)
SEGMENTED NEUTROPHILS ABSOLUTE COUNT: 7.5 K/UL (ref 1.3–9.1)
WBC # BLD: 13.1 K/UL (ref 3.5–11)

## 2022-08-10 PROCEDURE — 85025 COMPLETE CBC W/AUTO DIFF WBC: CPT

## 2022-08-10 PROCEDURE — 36415 COLL VENOUS BLD VENIPUNCTURE: CPT

## 2022-08-10 NOTE — PROGRESS NOTES
Pt arrived for therapeutic phlebotomy. Vitals obtained and labs drawn. Hct = 40.1, therefore procedure not indicated per written parameters. Pt discharged home, ambulatory per self.

## 2022-08-10 NOTE — PROGRESS NOTES
Pt arrived for therapeutic phlebotomy. Vitals obtained and labs drawn. Hct = 40.1, therefore procedure NOT indicated per written parameters. Pt discharged home without further interventions, ambulatory per self.

## 2022-08-17 ENCOUNTER — HOSPITAL ENCOUNTER (OUTPATIENT)
Dept: CT IMAGING | Age: 57
Discharge: HOME | End: 2022-08-19
Payer: MEDICARE

## 2022-08-17 ENCOUNTER — HOSPITAL ENCOUNTER (OUTPATIENT)
Dept: INFUSION THERAPY | Age: 57
Setting detail: INFUSION SERIES
Discharge: HOME | End: 2022-08-17
Payer: MEDICARE

## 2022-08-17 VITALS
RESPIRATION RATE: 14 BRPM | WEIGHT: 149 LBS | HEART RATE: 50 BPM | TEMPERATURE: 96.8 F | HEIGHT: 65 IN | BODY MASS INDEX: 24.83 KG/M2 | OXYGEN SATURATION: 99 % | DIASTOLIC BLOOD PRESSURE: 60 MMHG | SYSTOLIC BLOOD PRESSURE: 134 MMHG

## 2022-08-17 VITALS — OXYGEN SATURATION: 96 % | HEART RATE: 53 BPM | SYSTOLIC BLOOD PRESSURE: 145 MMHG | DIASTOLIC BLOOD PRESSURE: 79 MMHG

## 2022-08-17 DIAGNOSIS — D45 POLYCYTHEMIA VERA (HCC): ICD-10-CM

## 2022-08-17 LAB
ABSOLUTE EOS #: 0.3 K/UL (ref 0–0.4)
ABSOLUTE LYMPH #: 2.8 K/UL (ref 1–4.8)
ABSOLUTE MONO #: 0.6 K/UL (ref 0.1–1.3)
BASOPHILS # BLD: 1 % (ref 0–2)
BASOPHILS ABSOLUTE: 0.1 K/UL (ref 0–0.2)
EOSINOPHILS RELATIVE PERCENT: 3 % (ref 0–4)
HCT VFR BLD CALC: 43.2 % (ref 36–46)
HEMOGLOBIN: 14.7 G/DL (ref 12–16)
INR BLD: 0.9
LYMPHOCYTES # BLD: 29 % (ref 24–44)
MCH RBC QN AUTO: 30.6 PG (ref 26–34)
MCHC RBC AUTO-ENTMCNC: 34.1 G/DL (ref 31–37)
MCV RBC AUTO: 89.9 FL (ref 80–100)
MONOCYTES # BLD: 6 % (ref 1–7)
PARTIAL THROMBOPLASTIN TIME: 35.7 SEC (ref 24–36)
PDW BLD-RTO: 15.5 % (ref 11.5–14.9)
PLATELET # BLD: 573 K/UL (ref 150–450)
PLATELET # BLD: 573 K/UL (ref 150–450)
PMV BLD AUTO: 7.6 FL (ref 6–12)
PROTHROMBIN TIME: 12.4 SEC (ref 11.8–14.6)
RBC # BLD: 4.81 M/UL (ref 4–5.2)
SEG NEUTROPHILS: 61 % (ref 36–66)
SEGMENTED NEUTROPHILS ABSOLUTE COUNT: 5.8 K/UL (ref 1.3–9.1)
WBC # BLD: 9.7 K/UL (ref 3.5–11)

## 2022-08-17 PROCEDURE — 88185 FLOWCYTOMETRY/TC ADD-ON: CPT

## 2022-08-17 PROCEDURE — 88300 SURGICAL PATH GROSS: CPT

## 2022-08-17 PROCEDURE — 85730 THROMBOPLASTIN TIME PARTIAL: CPT

## 2022-08-17 PROCEDURE — 88280 CHROMOSOME KARYOTYPE STUDY: CPT

## 2022-08-17 PROCEDURE — 88184 FLOWCYTOMETRY/ TC 1 MARKER: CPT

## 2022-08-17 PROCEDURE — 88264 CHROMOSOME ANALYSIS 20-25: CPT

## 2022-08-17 PROCEDURE — 7100000011 HC PHASE II RECOVERY - ADDTL 15 MIN

## 2022-08-17 PROCEDURE — 88237 TISSUE CULTURE BONE MARROW: CPT

## 2022-08-17 PROCEDURE — 6360000002 HC RX W HCPCS: Performed by: RADIOLOGY

## 2022-08-17 PROCEDURE — 85049 AUTOMATED PLATELET COUNT: CPT

## 2022-08-17 PROCEDURE — 85610 PROTHROMBIN TIME: CPT

## 2022-08-17 PROCEDURE — 2580000003 HC RX 258: Performed by: RADIOLOGY

## 2022-08-17 PROCEDURE — 99195 PHLEBOTOMY: CPT

## 2022-08-17 PROCEDURE — 85025 COMPLETE CBC W/AUTO DIFF WBC: CPT

## 2022-08-17 PROCEDURE — 38221 DX BONE MARROW BIOPSIES: CPT

## 2022-08-17 PROCEDURE — 36415 COLL VENOUS BLD VENIPUNCTURE: CPT

## 2022-08-17 PROCEDURE — 7100000010 HC PHASE II RECOVERY - FIRST 15 MIN

## 2022-08-17 RX ORDER — SODIUM CHLORIDE 9 MG/ML
INJECTION, SOLUTION INTRAVENOUS CONTINUOUS
Status: DISCONTINUED | OUTPATIENT
Start: 2022-08-17 | End: 2022-08-20 | Stop reason: HOSPADM

## 2022-08-17 RX ORDER — SODIUM CHLORIDE 9 MG/ML
INJECTION, SOLUTION INTRAVENOUS PRN
Status: DISCONTINUED | OUTPATIENT
Start: 2022-08-17 | End: 2022-08-20 | Stop reason: HOSPADM

## 2022-08-17 RX ORDER — MIDAZOLAM HYDROCHLORIDE 5 MG/ML
INJECTION INTRAMUSCULAR; INTRAVENOUS
Status: COMPLETED | OUTPATIENT
Start: 2022-08-17 | End: 2022-08-17

## 2022-08-17 RX ORDER — SODIUM CHLORIDE 0.9 % (FLUSH) 0.9 %
5-40 SYRINGE (ML) INJECTION PRN
Status: DISCONTINUED | OUTPATIENT
Start: 2022-08-17 | End: 2022-08-20 | Stop reason: HOSPADM

## 2022-08-17 RX ORDER — FENTANYL CITRATE 50 UG/ML
INJECTION, SOLUTION INTRAMUSCULAR; INTRAVENOUS
Status: COMPLETED | OUTPATIENT
Start: 2022-08-17 | End: 2022-08-17

## 2022-08-17 RX ORDER — ACETAMINOPHEN 325 MG/1
650 TABLET ORAL EVERY 4 HOURS PRN
Status: DISCONTINUED | OUTPATIENT
Start: 2022-08-17 | End: 2022-08-20 | Stop reason: HOSPADM

## 2022-08-17 RX ORDER — SODIUM CHLORIDE 0.9 % (FLUSH) 0.9 %
5-40 SYRINGE (ML) INJECTION EVERY 12 HOURS SCHEDULED
Status: DISCONTINUED | OUTPATIENT
Start: 2022-08-17 | End: 2022-08-20 | Stop reason: HOSPADM

## 2022-08-17 RX ADMIN — SODIUM CHLORIDE: 9 INJECTION, SOLUTION INTRAVENOUS at 10:18

## 2022-08-17 RX ADMIN — FENTANYL CITRATE 50 MCG: 50 INJECTION, SOLUTION INTRAMUSCULAR; INTRAVENOUS at 11:59

## 2022-08-17 RX ADMIN — MIDAZOLAM HYDROCHLORIDE 1 MG: 5 INJECTION INTRAMUSCULAR; INTRAVENOUS at 11:50

## 2022-08-17 RX ADMIN — FENTANYL CITRATE 50 MCG: 50 INJECTION, SOLUTION INTRAMUSCULAR; INTRAVENOUS at 11:50

## 2022-08-17 ASSESSMENT — ENCOUNTER SYMPTOMS
ABDOMINAL DISTENTION: 0
WHEEZING: 1
SINUS PRESSURE: 0
APNEA: 0
CHEST TIGHTNESS: 0
SHORTNESS OF BREATH: 0
BACK PAIN: 0
SORE THROAT: 0
COUGH: 0
TROUBLE SWALLOWING: 0
RHINORRHEA: 1
NAUSEA: 0
SINUS PAIN: 0
CONSTIPATION: 0
VOMITING: 0
DIARRHEA: 0
ABDOMINAL PAIN: 0

## 2022-08-17 ASSESSMENT — PAIN - FUNCTIONAL ASSESSMENT: PAIN_FUNCTIONAL_ASSESSMENT: NONE - DENIES PAIN

## 2022-08-17 NOTE — PROGRESS NOTES
Pt arrived for therapeutic phlebotomy. Vitals obtained. Labs previously drawn. Hct = 43.2, therefore procedure indicated per written parameters. 500 mL whole blood removed using L arm. Pt tolerated well. Monitored post procedure. No s/s adverse reactions noted. Vitals remained stable. Pt discharged home, ambulatory per self.

## 2022-08-17 NOTE — DISCHARGE INSTRUCTIONS
RADIOLOGY POST BIOPSY INSTRUCTIONS    If you had IV Sedation:  No alcoholic beverages, no driving, operating machinery, or making legal or important decisions for 24 hours. Diet:    May resume your usual diet. Medications:  Use over the counter pain medications as directed on the bottle for pain control. You may continue your home medications as instructed by the radiologist.    Post-Procedure Activity:    Avoid exercises  and strenuous activities, such as bicycle riding, jogging, weight lifting, or aerobic exercise, for 1 week or until your doctor says it is okay. Avoid other heavy work or sports for 2 days. Limit activities for 24 hours. Resume normal diet. You will probably be able to shower the same day as the test, if your doctor says it is okay. Pat the puncture site dry. Do not take a bath for at least 2 days after the test, or until your doctor tells you it is okay. Puncture Site:  Keep clean and dry for 24-48 hours. Call Doctor if  Swelling occurs around puncture site. Bleeding occurs at puncture site. Shortness of breath occurs. Extreme pain occurs. IF YOU CANNOT REACH THE DOCTOR, GO TO THE NEAREST EMERGENCY FACILITY.      Phone:  Interventional Radiology  [phone]    Dr Vic Brown  After hours Radiology  [phone]

## 2022-08-17 NOTE — POST SEDATION
Sedation Post Procedure Note    Patient Name: Mark Epperson   YOB: 1965  Room/Bed: Room/bed info not found  Medical Record Number: [id number]  Date: 8/17/2022   Time: 12:09 PM         Physicians/Assistants: Gail Daniel MD, MD    Procedure Performed:  CT guided random bone marrow biopsy    Post-Sedation Vital Signs:  Vitals:    08/17/22 1204   BP: (!) 140/73   Pulse: 53   Resp: 18   Temp:    SpO2: 94%      Vital signs were reviewed and were stable after the procedure (see flow sheet for vitals)           Complications: none    Electronically signed by Gail Daniel MD on 8/17/2022 at 12:09 PM

## 2022-08-17 NOTE — H&P
HISTORY and [address] [id number]       NAME:  Giuliana Prasad  MRN: [id number]   YOB: 1965   Date: 8/17/2022   Age: 56 y.o. Gender: female       COMPLAINT AND PRESENT HISTORY:   Giuliana Prasad  is a 56 y.o. female presenting today for an IR Bone marrow bx. She states todays procedure was ordered by her PCP to [facility] for cancer\" she denies any acute symptoms, nothing new for her in the past year, doesn't really understand why she is having the testing but is agreeable . Pt undergoes weekly therapeutic phlebotomy, last was on 8/10/22. Hematology office note 7/26/22     HISTORY OF PRESENT ILLNESS:    Oncologic History:    Giuliana Prasad is a very pleasant 56 y.o. female was referred for high hemoglobin hematocrit and positive JAK2 mutation also had platelet elevated at 601 WBC fluctuating up and down, reviewing labs for the last 3 years she had high hematocrit since at least 2019 with rising platelet count and fluctuating neutrophilia, patient denied history of stroke or thrombosis or myocardial infarction,she had diabetes and she is on nitroglycerin, she does have history of V. tach and stress test was negative and on Eliquis for A. fib    Pt has a PMHX significant for AFIB, CKD, HLD, polycythemia, HTN, copd         NPO since midnight. Pt does not wear dentures. Pt denies any hx of MRSA infection  Pt is currently on eliquis, last dose Sunday  Pt denies any personal or FHx of complications with anesthesia. Pt denies any acute symptoms of illness at this time including no SOB, CP, fever, URI or UTI symptoms.       RECENT IMAGING    US LIVER    Result Date: 7/20/2022  Multiple hepatic cysts with otherwise unremarkable exam.        PAST MEDICAL HISTORY     Past Medical History:   Diagnosis Date    Chronic kidney disease     CKD (chronic kidney disease) stage 1, GFR 90 ml/min or greater     COPD (chronic obstructive pulmonary disease) (HCC)     Depression     Hyperlipidemia Hypocalcemia     IBS (irritable bowel syndrome)     Polycythemia vera ([facility] [address] 2022    Primary hypertension 3/22/2022    Proteinuria        SURGICAL HISTORY       Past Surgical History:   Procedure Laterality Date    BREAST BIOPSY       SECTION      CS x 2    COLONOSCOPY      TUBAL LIGATION Bilateral        FAMILY HISTORY       Family History   Problem Relation Age of Onset    Asthma Father     Cancer Father         colon    Diabetes Father     COPD Father     Heart Failure Father     Seizures Mother        SOCIAL HISTORY       Social History     Socioeconomic History    Marital status: Single   Tobacco Use    Smoking status: Every Day     Packs/day: 0.50     Years: 28.00     Pack years: 14.00     Types: Cigarettes    Smokeless tobacco: Never    Tobacco comments:     down to 5 cigs per day   Vaping Use    Vaping Use: Never used   Substance and Sexual Activity    Alcohol use: No     Alcohol/week: 0.0 standard drinks    Drug use: No     Social Determinants of Health     Financial Resource Strain: High Risk    Difficulty of Paying Living Expenses: Hard   Food Insecurity: Food Insecurity Present    Worried About Running Out of Food in the Last Year: Often true    Ran Out of Food in the Last Year: Often true           REVIEW OF SYSTEMS      No Known Allergies    Current Outpatient Medications on File Prior to Encounter   Medication Sig Dispense Refill    vitamin D (ERGOCALCIFEROL) 1.25 MG (14265 UT) CAPS capsule Take 1 capsule by mouth once a week 12 capsule 1    metFORMIN (GLUCOPHAGE-XR) 500 MG extended release tablet Take 1 tablet by mouth daily (with breakfast) 30 tablet 3    ELIQUIS 5 MG TABS tablet TAKE 1 TABLET BY MOUTH TWICE DAILY 60 tablet 3    tiotropium (SPIRIVA RESPIMAT) 1.25 MCG/ACT AERS inhaler INHALE 2 PUFFS BY MOUTH EVERY DAY 4 g 2    famotidine (PEPCID) 20 MG tablet TAKE 1 TABLET BY MOUTH TWICE DAILY 60 tablet 3    atorvastatin (LIPITOR) 40 MG tablet TAKE 1 TABLET BY MOUTH DAILY 30 tablet 1

## 2022-08-17 NOTE — PRE SEDATION
Sedation Pre-Procedure Note    Patient Name: Riya Colvin   YOB: 1965  Room/Bed: Room/bed info not found  Medical Record Number: [id number]  Date: 2022   Time: 11:35 AM       Indication:  Polycythemia vera, neutropenia    Consent: I have discussed with the patient and/or the patient representative the indication, alternatives, and the possible risks and/or complications of the planned procedure and the anesthesia methods. The patient and/or patient representative appear to understand and agree to proceed. Vital Signs:   Vitals:    22 0949   BP: (!) 142/87   Pulse: 60   Resp: 16   Temp: 97.5 °F (36.4 °C)   SpO2: 97%       Past Medical History:   has a past medical history of Chronic kidney disease, CKD (chronic kidney disease) stage 1, GFR 90 ml/min or greater, COPD (chronic obstructive pulmonary disease) ([facility] [address] Depression, Hyperlipidemia, Hypocalcemia, IBS (irritable bowel syndrome), Polycythemia vera ([facility] [address] Primary hypertension, and Proteinuria. Past Surgical History:   has a past surgical history that includes  section; Tubal ligation (Bilateral); Colonoscopy; and Breast biopsy. Medications:   Scheduled Meds:    sodium chloride flush  5-40 mL IntraVENous 2 times per day     Continuous Infusions:    sodium chloride 20 mL/hr at 22 1018    sodium chloride       PRN Meds: sodium chloride flush, sodium chloride  Home Meds:   Prior to Admission medications    Medication Sig Start Date End Date Taking?  Authorizing Provider   vitamin D (ERGOCALCIFEROL) 1.25 MG (87475 UT) CAPS capsule Take 1 capsule by mouth once a week 22   Thomas Ruiz MD   metFORMIN (GLUCOPHAGE-XR) 500 MG extended release tablet Take 1 tablet by mouth daily (with breakfast) 22   Thomas Ruiz MD   ELIQUIS 5 MG TABS tablet TAKE 1 TABLET BY MOUTH TWICE DAILY 22   Thomas Ruiz MD   tiotropium (SPIRIVA RESPIMAT) 1.25 MCG/ACT AERS inhaler INHALE 2 PUFFS BY MOUTH EVERY DAY 22   Meche Todd Jarquin MD   famotidine (PEPCID) 20 MG tablet TAKE 1 TABLET BY MOUTH TWICE DAILY 7/1/22   Malcolm Chavez MD   atorvastatin (LIPITOR) 40 MG tablet TAKE 1 TABLET BY MOUTH DAILY 5/31/22   Malcolm Chavez MD   VENTOLIN  (90 Base) MCG/ACT inhaler INHALE 2 PUFFS INTO THE LUNGS EVERY 6 HOURS AS NEEDED FOR WHEEZING 4/15/22   Malcolm Chavez MD   flecainide (TAMBOCOR) 50 MG tablet  3/21/22   Historical Provider, MD   nitroGLYCERIN (NITROSTAT) 0.4 MG SL tablet Place 1 tablet under the tongue every 5 minutes as needed for Chest pain up to max of 3 total doses. If no relief after 1 dose, call 911. 3/22/22   Malcolm Chavez MD   Blood Pressure KIT Dx: HTN. Needs automatic blood pressure machine to monitor her blood pressure. 3/22/22   Malcolm Chavez MD   nicotine (NICODERM CQ) 14 MG/24HR Place 1 patch onto the skin daily 3/22/22 8/17/22  Malcolm Chavez MD   metoprolol succinate (TOPROL XL) 25 MG extended release tablet Take 1 tablet by mouth daily 1/26/22   Malcolm Chavez MD   Elastic Bandages & Supports (KNEE BRACE/HINGED/LARGE) MISC Use daily for knee pain 6/6/19   Malcolm Chavez MD   Blood Pressure Monitor KIT TAKE BLOOD PRESSURE DAILY  Patient not taking: Reported on 7/26/2022 2/11/16   [facility], MD     Coumadin Use Last 7 Days:  no  Antiplatelet drug therapy use last 7 days: no  Other anticoagulant use last 7 days: no  Additional Medication Information:  see [facility]      Pre-Sedation Documentation and Exam:   I have reviewed the patient's history and review of systems.     Mallampati Airway Assessment:  Mallampati Class II - (soft palate, fauces & uvula are visible)    Prior History of Anesthesia Complications:   none    ASA Classification:  Class 2 - A normal healthy patient with mild systemic disease    Sedation/ Anesthesia Plan:   intravenous sedation    Medications Planned:   midazolam (Versed) intravenously and fentanyl intravenously    Patient is an appropriate candidate for plan of sedation: yes    Electronically signed by Kely Arriaga MD on 8/17/2022 at 11:35 AM

## 2022-08-17 NOTE — BRIEF OP NOTE
Brief Postoperative Note    Tony Hubbard  YOB: 1965  [id number]    Pre-operative Diagnosis: Polycythemia vera    Post-operative Diagnosis: Same    Procedure: CT guided random bone marrow biopsy    Anesthesia: Moderate Sedation    Surgeons/Assistants: Sarwat    Estimated Blood Loss: less than 50     Complications: None    Specimens: Was Obtained: aspirate and single 11 G core    Electronically signed by Curry Bradshaw MD on 8/17/2022 at 12:10 PM

## 2022-08-18 ENCOUNTER — NURSE ONLY (OUTPATIENT)
Dept: LAB | Age: 57
End: 2022-08-18

## 2022-08-19 LAB
BONE MARROW REPORT: NORMAL
FLOW CYTOMETRY, BM: NORMAL

## 2022-08-21 DIAGNOSIS — J41.0 SIMPLE CHRONIC BRONCHITIS (HCC): ICD-10-CM

## 2022-08-21 DIAGNOSIS — E78.2 MIXED HYPERLIPIDEMIA: ICD-10-CM

## 2022-08-22 RX ORDER — ATORVASTATIN CALCIUM 40 MG/1
40 TABLET, FILM COATED ORAL DAILY
Qty: 30 TABLET | Refills: 0 | Status: SHIPPED | OUTPATIENT
Start: 2022-08-22 | End: 2022-09-01

## 2022-08-23 ENCOUNTER — OFFICE VISIT (OUTPATIENT)
Dept: ONCOLOGY | Age: 57
End: 2022-08-23
Payer: MEDICARE

## 2022-08-23 ENCOUNTER — TELEPHONE (OUTPATIENT)
Dept: ONCOLOGY | Age: 57
End: 2022-08-23

## 2022-08-23 ENCOUNTER — HOSPITAL ENCOUNTER (OUTPATIENT)
Age: 57
Discharge: HOME | End: 2022-08-23
Payer: MEDICARE

## 2022-08-23 VITALS
HEART RATE: 70 BPM | TEMPERATURE: 97.1 F | SYSTOLIC BLOOD PRESSURE: 163 MMHG | DIASTOLIC BLOOD PRESSURE: 82 MMHG | WEIGHT: 152.8 LBS | BODY MASS INDEX: 25.43 KG/M2

## 2022-08-23 DIAGNOSIS — D75.839 THROMBOCYTHEMIA: ICD-10-CM

## 2022-08-23 DIAGNOSIS — D45 POLYCYTHEMIA VERA (HCC): Primary | ICD-10-CM

## 2022-08-23 DIAGNOSIS — D45 POLYCYTHEMIA VERA (HCC): ICD-10-CM

## 2022-08-23 DIAGNOSIS — D72.9 NEUTROPHILIA: ICD-10-CM

## 2022-08-23 LAB
ABSOLUTE EOS #: 0.3 K/UL (ref 0–0.4)
ABSOLUTE LYMPH #: 3.5 K/UL (ref 1–4.8)
ABSOLUTE MONO #: 0.6 K/UL (ref 0.1–1.3)
BASOPHILS # BLD: 1 % (ref 0–2)
BASOPHILS ABSOLUTE: 0.1 K/UL (ref 0–0.2)
EOSINOPHILS RELATIVE PERCENT: 3 % (ref 0–4)
HCT VFR BLD CALC: 39.2 % (ref 36–46)
HEMOGLOBIN: 13.2 G/DL (ref 12–16)
LYMPHOCYTES # BLD: 33 % (ref 24–44)
MCH RBC QN AUTO: 30.4 PG (ref 26–34)
MCHC RBC AUTO-ENTMCNC: 33.8 G/DL (ref 31–37)
MCV RBC AUTO: 90.1 FL (ref 80–100)
MONOCYTES # BLD: 6 % (ref 1–7)
PDW BLD-RTO: 16.1 % (ref 11.5–14.9)
PLATELET # BLD: 555 K/UL (ref 150–450)
PMV BLD AUTO: 7.3 FL (ref 6–12)
RBC # BLD: 4.35 M/UL (ref 4–5.2)
SEG NEUTROPHILS: 57 % (ref 36–66)
SEGMENTED NEUTROPHILS ABSOLUTE COUNT: 6 K/UL (ref 1.3–9.1)
WBC # BLD: 10.6 K/UL (ref 3.5–11)

## 2022-08-23 PROCEDURE — 85025 COMPLETE CBC W/AUTO DIFF WBC: CPT

## 2022-08-23 PROCEDURE — G8427 DOCREV CUR MEDS BY ELIG CLIN: HCPCS | Performed by: INTERNAL MEDICINE

## 2022-08-23 PROCEDURE — 99215 OFFICE O/P EST HI 40 MIN: CPT | Performed by: INTERNAL MEDICINE

## 2022-08-23 PROCEDURE — 3017F COLORECTAL CA SCREEN DOC REV: CPT | Performed by: INTERNAL MEDICINE

## 2022-08-23 PROCEDURE — 36415 COLL VENOUS BLD VENIPUNCTURE: CPT

## 2022-08-23 PROCEDURE — G8419 CALC BMI OUT NRM PARAM NOF/U: HCPCS | Performed by: INTERNAL MEDICINE

## 2022-08-23 PROCEDURE — 4004F PT TOBACCO SCREEN RCVD TLK: CPT | Performed by: INTERNAL MEDICINE

## 2022-08-23 NOTE — TELEPHONE ENCOUNTER
Avs from 8/23/22    Change phlebotomy to every 4 weeks    Writer will meet w/ infusion to make Phlebotomy every 4 weeks.   MD would like to rv on 9/23/22 @ 9am   Labs be drawn prior (cbc)     PT was given AVS and appt schedule    Electronically signed by Claudia Romero on 8/23/2022 at 10:36 AM

## 2022-08-23 NOTE — PROGRESS NOTES
mouth once a week 12 capsule 1    metFORMIN (GLUCOPHAGE-XR) 500 MG extended release tablet Take 1 tablet by mouth daily (with breakfast) 30 tablet 3    ELIQUIS 5 MG TABS tablet TAKE 1 TABLET BY MOUTH TWICE DAILY 60 tablet 3    tiotropium (SPIRIVA RESPIMAT) 1.25 MCG/ACT AERS inhaler INHALE 2 PUFFS BY MOUTH EVERY DAY 4 g 2    famotidine (PEPCID) 20 MG tablet TAKE 1 TABLET BY MOUTH TWICE DAILY 60 tablet 3    flecainide (TAMBOCOR) 50 MG tablet       nitroGLYCERIN (NITROSTAT) 0.4 MG SL tablet Place 1 tablet under the tongue every 5 minutes as needed for Chest pain up to max of 3 total doses. If no relief after 1 dose, call 911. 25 tablet 3    Blood Pressure KIT Dx: HTN. Needs automatic blood pressure machine to monitor her blood pressure. 1 kit 0    metoprolol succinate (TOPROL XL) 25 MG extended release tablet Take 1 tablet by mouth daily 30 tablet 3    Elastic Bandages & Supports (KNEE BRACE/HINGED/LARGE) MISC Use daily for knee pain 1 each 0    Blood Pressure Monitor KIT TAKE BLOOD PRESSURE DAILY 1 kit 0    nicotine (NICODERM CQ) 14 MG/24HR Place 1 patch onto the skin daily 42 patch 2     No current facility-administered medications for this visit.        Social History     Socioeconomic History    Marital status: Single     Spouse name: Not on file    Number of children: Not on file    Years of education: Not on file    Highest education level: Not on file   Occupational History    Not on file   Tobacco Use    Smoking status: Every Day     Packs/day: 0.25     Years: 28.00     Pack years: 7.00     Types: Cigarettes    Smokeless tobacco: Never    Tobacco comments:     down to 5 cigs per day   Vaping Use    Vaping Use: Never used   Substance and Sexual Activity    Alcohol use: No     Alcohol/week: 0.0 standard drinks    Drug use: No    Sexual activity: Not on file   Other Topics Concern    Not on file   Social History Narrative    Not on file     Social Determinants of Health     Financial Resource Strain: High Risk Difficulty of Paying Living Expenses: Hard   Food Insecurity: Food Insecurity Present    Worried About Running Out of Food in the Last Year: Often true    Ran Out of Food in the Last Year: Often true   Transportation Needs: Not on file   Physical Activity: Not on file   Stress: Not on file   Social Connections: Not on file   Intimate Partner Violence: Not on file   Housing Stability: Not on file       Family History   Problem Relation Age of Onset    Asthma Father     Cancer Father         colon    Diabetes Father     COPD Father     Heart Failure Father     Seizures Mother         REVIEW OF SYSTEM:     Constitutional: No fever or chills. No night sweats, no weight loss   Eyes: No eye discharge, double vision, or eye pain   HEENT: negative for sore mouth, sore throat, hoarseness and voice change   Respiratory: negative for cough , sputum, dyspnea, wheezing, hemoptysis, chest pain   Cardiovascular: negative for chest pain, dyspnea, palpitations, orthopnea, PND   Gastrointestinal: negative for nausea, vomiting, diarrhea, constipation, abdominal pain, Dysphagia, hematemesis and hematochezia   Genitourinary: negative for frequency, dysuria, nocturia, urinary incontinence, and hematuria   Integument: negative for rash, skin lesions, bruises.    Hematologic/Lymphatic: negative for easy bruising, bleeding, lymphadenopathy, petechiae and swelling/edema   Endocrine: negative for heat or cold intolerance, tremor, weight changes, change in bowel habits and hair loss   Musculoskeletal: negative for myalgias, arthralgias, pain, joint swelling,and bone pain   Neurological: negative for headaches, dizziness, seizures, weakness, numbness       OBJECTIVE:         Vitals:    08/23/22 0947   BP: (!) 163/82   Pulse: 70   Temp: 97.1 °F (36.2 °C)       PHYSICAL EXAM:   General appearance - well appearing, no in pain or distress   Mental status - alert and cooperative   Eyes - pupils equal and reactive, extraocular eye movements intact Ears - bilateral TM's and external ear canals normal   Mouth - mucous membranes moist, pharynx normal without lesions   Neck - supple, no significant adenopathy   Lymphatics - no palpable lymphadenopathy, no hepatosplenomegaly   Chest - clear to auscultation, no wheezes, rales or rhonchi, symmetric air entry   Heart - normal rate, regular rhythm, normal S1, S2, no murmurs, rubs, clicks or gallops   Abdomen - soft, nontender, nondistended, no masses or organomegaly   Neurological - alert, oriented, normal speech, no focal findings or movement disorder noted   Musculoskeletal - no joint tenderness, deformity or swelling   Extremities - peripheral pulses normal, no pedal edema, no clubbing or cyanosis   Skin - normal coloration and turgor, no rashes, no suspicious skin lesions noted ,      LABORATORY DATA:     Lab Results   Component Value Date    WBC 10.6 08/23/2022    HGB 13.2 08/23/2022    HCT 39.2 08/23/2022    MCV 90.1 08/23/2022     (H) 08/23/2022    LYMPHOPCT 33 08/23/2022    RBC 4.35 08/23/2022    MCH 30.4 08/23/2022    MCHC 33.8 08/23/2022    RDW 16.1 (H) 08/23/2022    MONOPCT 6 08/23/2022    BASOPCT 1 08/23/2022    NEUTROABS 6.00 08/23/2022    LYMPHSABS 3.50 08/23/2022    MONOSABS 0.60 08/23/2022    EOSABS 0.30 08/23/2022    BASOSABS 0.10 08/23/2022         Chemistry        Component Value Date/Time     03/22/2022 1256    K 4.8 03/22/2022 1256     03/22/2022 1256    CO2 26 03/22/2022 1256    BUN 15 03/22/2022 1256    CREATININE 0.91 (H) 03/22/2022 1256        Component Value Date/Time    CALCIUM 9.5 03/22/2022 1256    ALKPHOS 122 (H) 03/22/2022 1256    AST 36 (H) 03/22/2022 1256    ALT 45 (H) 03/22/2022 1256    BILITOT 0.40 03/22/2022 1256            PATHOLOGY DATA:         IMAGING DATA:      CT BIOPSY BONE MARROW  Narrative: PROCEDURE:  CT GUIDED BONE MARROW ASPIRATION AND CORE NEEDLE BONE BIOPSY OF THE left  ILIAC BONE.     MODERATE CONSCIOUS SEDATION    8/17/2022    HISTORY:  ORDERING SYSTEM PROVIDED HISTORY: Polycythemia vera ([facility] [address]  TECHNOLOGIST PROVIDED HISTORY:  Is the patient pregnant?->No    SEDATION:  Moderate sedation was ordered and supervised by the attending with physician  face-to-face monitoring. Medications were provided and recorded by Radiology  nurses. TECHNIQUE:  Informed consent was obtained following a detailed explanation of the  procedure including risks, benefits, and alternatives. Universal protocol  was followed. Sterile gowns, masks, hats and gloves utilized for maximal  sterile barrier. Axial images were obtained through the iliac bones using CT  guidance and a suitable skin site was prepped and draped in sterile fashion. Local anesthesia was achieved with lidocaine. An 11 gauge [employer] bone  marrow biopsy needle was advanced into the left iliac bone and approximately  12 mL of bone marrow aspirate was obtained. A single core biopsy specimen  was obtained and the patient tolerated the procedure well. Dose modulation, iterative reconstruction, and/or weight based adjustment of  the mA/kV was utilized to reduce the radiation dose to as low as reasonably  achievable. Impression: Successful CT guided bone marrow aspiration and core biopsy of the left iliac  bone. ASSESSMENT:       Diagnosis Orders   [facility]. [facility]        2. Neutrophilia        3. Thrombocythemia               MPN with positive JAK2 mutation(PV/ET)  History of A. fib on Eliquis  Diabetes and hypertension    PLAN:     I reviewed the labs available to me,outside records and discussed with the patient. I explained to the patient the nature of this hematologic problem.  I explained the significance of these abnormalities and possible etiology and management options   Patient had JAK2 mutation positive MPN compatible with polycythemia vera with high platelet it is difficult to differentiate if this is purely PV versus ET however under category of JAK2 mutation MPN and the treatment in this case would be phlebotomy to keep hematocrit below 43,this is considered low risk PV/ET with her age below 63 and absent of thrombotic event, recommend add aspirin low-dose 81 mg, control risk factors of CAD, no indication for Hydrea  Will have a second review of the bone marrow biopsy to review the erythroid maturation versus megakaryocyte  Explained to the patient that Eliquis and aspirin increased risk of bleed will monitor her closely  Will change phlebotomy to every 4 weeks to keep hematocrit below 43      Thank you for the consult                                        [facility]                            This note is created with the assistance of a speech recognition program.  While intending to generate a document that actually reflects the content of the visit, the document can still have some errors including those of syntax and sound a like substitutions which may escape proof reading. It such instances, actual meaning can be extrapolated by contextual diversion.

## 2022-08-25 LAB — CHROMOSOME STUDY: NORMAL

## 2022-08-27 ENCOUNTER — APPOINTMENT (OUTPATIENT)
Dept: GENERAL RADIOLOGY | Age: 57
End: 2022-08-27
Payer: MEDICARE

## 2022-08-27 ENCOUNTER — HOSPITAL ENCOUNTER (EMERGENCY)
Age: 57
Discharge: HOME | End: 2022-08-27
Attending: EMERGENCY MEDICINE
Payer: MEDICARE

## 2022-08-27 VITALS
BODY MASS INDEX: 25.33 KG/M2 | WEIGHT: 152 LBS | SYSTOLIC BLOOD PRESSURE: 119 MMHG | OXYGEN SATURATION: 96 % | DIASTOLIC BLOOD PRESSURE: 84 MMHG | RESPIRATION RATE: 18 BRPM | HEART RATE: 71 BPM | TEMPERATURE: 96.9 F | HEIGHT: 65 IN

## 2022-08-27 DIAGNOSIS — R07.89 CHEST WALL PAIN: Primary | ICD-10-CM

## 2022-08-27 LAB
ABSOLUTE EOS #: 0.4 K/UL (ref 0–0.4)
ABSOLUTE LYMPH #: 3.7 K/UL (ref 1–4.8)
ABSOLUTE MONO #: 0.7 K/UL (ref 0.1–1.3)
ANION GAP SERPL CALCULATED.3IONS-SCNC: 11 MMOL/L (ref 9–17)
BASOPHILS # BLD: 1 % (ref 0–2)
BASOPHILS ABSOLUTE: 0.1 K/UL (ref 0–0.2)
BUN BLDV-MCNC: 11 MG/DL (ref 6–20)
CALCIUM SERPL-MCNC: 9.2 MG/DL (ref 8.6–10.4)
CHLORIDE BLD-SCNC: 104 MMOL/L (ref 98–107)
CO2: 26 MMOL/L (ref 20–31)
CREAT SERPL-MCNC: 0.66 MG/DL (ref 0.5–0.9)
EOSINOPHILS RELATIVE PERCENT: 3 % (ref 0–4)
GFR AFRICAN AMERICAN: >60 ML/MIN
GFR NON-AFRICAN AMERICAN: >60 ML/MIN
GFR SERPL CREATININE-BSD FRML MDRD: ABNORMAL ML/MIN/{1.73_M2}
GLUCOSE BLD-MCNC: 128 MG/DL (ref 70–99)
HCT VFR BLD CALC: 38.8 % (ref 36–46)
HEMOGLOBIN: 13.3 G/DL (ref 12–16)
LYMPHOCYTES # BLD: 35 % (ref 24–44)
MCH RBC QN AUTO: 30.9 PG (ref 26–34)
MCHC RBC AUTO-ENTMCNC: 34.3 G/DL (ref 31–37)
MCV RBC AUTO: 90 FL (ref 80–100)
MONOCYTES # BLD: 7 % (ref 1–7)
MYOGLOBIN: <21 NG/ML (ref 25–58)
MYOGLOBIN: <21 NG/ML (ref 25–58)
PDW BLD-RTO: 15.7 % (ref 11.5–14.9)
PLATELET # BLD: 558 K/UL (ref 150–450)
PMV BLD AUTO: 7.7 FL (ref 6–12)
POTASSIUM SERPL-SCNC: 3.8 MMOL/L (ref 3.7–5.3)
RBC # BLD: 4.31 M/UL (ref 4–5.2)
SEG NEUTROPHILS: 54 % (ref 36–66)
SEGMENTED NEUTROPHILS ABSOLUTE COUNT: 5.8 K/UL (ref 1.3–9.1)
SODIUM BLD-SCNC: 141 MMOL/L (ref 135–144)
TROPONIN, HIGH SENSITIVITY: <6 NG/L (ref 0–14)
TROPONIN, HIGH SENSITIVITY: <6 NG/L (ref 0–14)
WBC # BLD: 10.7 K/UL (ref 3.5–11)

## 2022-08-27 PROCEDURE — 83874 ASSAY OF MYOGLOBIN: CPT

## 2022-08-27 PROCEDURE — 85025 COMPLETE CBC W/AUTO DIFF WBC: CPT

## 2022-08-27 PROCEDURE — 93005 ELECTROCARDIOGRAM TRACING: CPT | Performed by: EMERGENCY MEDICINE

## 2022-08-27 PROCEDURE — 36415 COLL VENOUS BLD VENIPUNCTURE: CPT

## 2022-08-27 PROCEDURE — 96374 THER/PROPH/DIAG INJ IV PUSH: CPT

## 2022-08-27 PROCEDURE — 99285 EMERGENCY DEPT VISIT HI MDM: CPT

## 2022-08-27 PROCEDURE — 84484 ASSAY OF TROPONIN QUANT: CPT

## 2022-08-27 PROCEDURE — 80048 BASIC METABOLIC PNL TOTAL CA: CPT

## 2022-08-27 PROCEDURE — 71045 X-RAY EXAM CHEST 1 VIEW: CPT

## 2022-08-27 PROCEDURE — 6360000002 HC RX W HCPCS: Performed by: EMERGENCY MEDICINE

## 2022-08-27 RX ORDER — KETOROLAC TROMETHAMINE 30 MG/ML
30 INJECTION, SOLUTION INTRAMUSCULAR; INTRAVENOUS ONCE
Status: COMPLETED | OUTPATIENT
Start: 2022-08-27 | End: 2022-08-27

## 2022-08-27 RX ADMIN — KETOROLAC TROMETHAMINE 30 MG: 30 INJECTION, SOLUTION INTRAMUSCULAR at 21:03

## 2022-08-27 ASSESSMENT — ENCOUNTER SYMPTOMS
COLOR CHANGE: 0
SHORTNESS OF BREATH: 1
CONSTIPATION: 0
EYE DISCHARGE: 0
BLOOD IN STOOL: 0
EYE REDNESS: 0
EYE PAIN: 0
WHEEZING: 0
CHEST TIGHTNESS: 0
ABDOMINAL PAIN: 0
SINUS PRESSURE: 0
COUGH: 0
SORE THROAT: 0
FACIAL SWELLING: 0
RHINORRHEA: 0
TROUBLE SWALLOWING: 0
BACK PAIN: 0
DIARRHEA: 0
NAUSEA: 0
VOMITING: 0

## 2022-08-27 ASSESSMENT — PAIN - FUNCTIONAL ASSESSMENT
PAIN_FUNCTIONAL_ASSESSMENT: 0-10
PAIN_FUNCTIONAL_ASSESSMENT: 0-10

## 2022-08-27 ASSESSMENT — PAIN DESCRIPTION - LOCATION
LOCATION: CHEST
LOCATION: CHEST

## 2022-08-27 ASSESSMENT — PAIN SCALES - GENERAL
PAINLEVEL_OUTOF10: 4

## 2022-08-27 ASSESSMENT — PAIN DESCRIPTION - DESCRIPTORS: DESCRIPTORS: PRESSURE

## 2022-08-27 ASSESSMENT — LIFESTYLE VARIABLES: HOW OFTEN DO YOU HAVE A DRINK CONTAINING ALCOHOL: NEVER

## 2022-08-27 ASSESSMENT — PAIN DESCRIPTION - ORIENTATION: ORIENTATION: RIGHT;MID

## 2022-08-28 NOTE — ED NOTES
Mode of arrival (squad #, walk in, police, etc) : walk in        Chief complaint(s): Chest pain, SOB        Arrival Note (brief scenario, treatment PTA, etc). : Patient arrived to ED c/o CP and SOB that began while working today. Denies injury. States pain began in mid chest and has now spread to her right side. Reports having a bone biopsy done earlier in the week as an outpatient. C= \"Have you ever felt that you should Cut down on your drinking? \"  no  A= \"Have people Annoyed you by criticizing your drinking? \"  no  G= \"Have you ever felt bad or Guilty about your drinking? \" no  E= \"Have you ever had a drink as an Eye-opener first thing in the morning to steady your nerves or to help a hangover? \"  no           Anthoyn Aiken RN  08/27/22 0874

## 2022-08-28 NOTE — ED PROVIDER NOTES
[address]  eMERGENCY dEPARTMENT eNCOUnter      Pt Name: Cesia Chappell  MRN: [id number]  [place] 1965  Date of evaluation: 8/27/22      CHIEF COMPLAINT       Chief Complaint   Patient presents with    Chest Pain     Pressure, started at 1700, now worsening. Shortness of Breath         HISTORY OF PRESENT ILLNESS    Cesia Chappell is a 56 y.o. female who presents complaining of chest pain. Patient states she had sudden onset of chest pain while she was at work around 5 PM.  Patient states she just has this twisting feeling in the lower mid section of her chest that is now radiating to the right side. Patient states it does cause her to be short of breath but no palpitations. Patient is COPD patient has a chronic cough but states it has not changed. Patient had no recent injuries travel or surgeries. Patient has no pain or swelling in the legs. REVIEW OF SYSTEMS       Review of Systems   Constitutional:  Negative for activity change, appetite change, chills, diaphoresis and fever. HENT:  Negative for congestion, ear pain, facial swelling, nosebleeds, rhinorrhea, sinus pressure, sore throat and trouble swallowing. Eyes:  Negative for pain, discharge and redness. Respiratory:  Positive for shortness of breath. Negative for cough, chest tightness and wheezing. Cardiovascular:  Positive for chest pain. Negative for palpitations and leg swelling. Gastrointestinal:  Negative for abdominal pain, blood in stool, constipation, diarrhea, nausea and vomiting. Genitourinary:  Negative for difficulty urinating, dysuria, flank pain, frequency, genital sores and hematuria. Musculoskeletal:  Negative for arthralgias, back pain, gait problem, joint swelling, myalgias and neck pain. Skin:  Negative for color change, pallor, rash and wound. Neurological:  Negative for dizziness, tremors, seizures, syncope, speech difficulty, weakness, numbness and headaches.    Psychiatric/Behavioral: Negative for confusion, decreased concentration, hallucinations, self-injury, sleep disturbance and suicidal ideas.       PAST MEDICAL HISTORY     Past Medical History:   Diagnosis Date    Chronic kidney disease     CKD (chronic kidney disease) stage 1, GFR 90 ml/min or greater     COPD (chronic obstructive pulmonary disease) (HCC)     Depression     Hyperlipidemia     Hypocalcemia     IBS (irritable bowel syndrome)     Polycythemia vera ([facility] [address] 2022    Primary hypertension 3/22/2022    Proteinuria        SURGICAL HISTORY       Past Surgical History:   Procedure Laterality Date    BREAST BIOPSY       SECTION      CS x 2    COLONOSCOPY      CT BONE MARROW BIOPSY  2022    CT BONE MARROW BIOPSY 2022 [facility] CT SCAN    TUBAL LIGATION Bilateral        CURRENT MEDICATIONS       Current Discharge Medication List        CONTINUE these medications which have NOT CHANGED    Details   atorvastatin (LIPITOR) 40 MG tablet TAKE 1 TABLET BY MOUTH DAILY  Qty: 30 tablet, Refills: 0    Associated Diagnoses: Mixed hyperlipidemia      VENTOLIN  (90 Base) MCG/ACT inhaler INHALE 2 PUFFS INTO THE LUNGS EVERY 6 HOURS AS NEEDED FOR WHEEZING  Qty: 18 g, Refills: 0    Associated Diagnoses: Simple chronic bronchitis (HCC)      vitamin D (ERGOCALCIFEROL) 1.25 MG (74602 UT) CAPS capsule Take 1 capsule by mouth once a week  Qty: 12 capsule, Refills: 1    Associated Diagnoses: Vitamin D deficiency      metFORMIN (GLUCOPHAGE-XR) 500 MG extended release tablet Take 1 tablet by mouth daily (with breakfast)  Qty: 30 tablet, Refills: 3    Associated Diagnoses: Prediabetes      ELIQUIS 5 MG TABS tablet TAKE 1 TABLET BY MOUTH TWICE DAILY  Qty: 60 tablet, Refills: 3    Associated Diagnoses: Ventricular tachyarrhythmia (HCC)      tiotropium (SPIRIVA RESPIMAT) 1.25 MCG/ACT AERS inhaler INHALE 2 PUFFS BY MOUTH EVERY DAY  Qty: 4 g, Refills: 2    Associated Diagnoses: Simple chronic bronchitis (HCC)      famotidine (PEPCID) 20 MG tablet TAKE 1 TABLET BY MOUTH TWICE DAILY  Qty: 60 tablet, Refills: 3    Associated Diagnoses: Irritable bowel syndrome with constipation      flecainide (TAMBOCOR) 50 MG tablet       nitroGLYCERIN (NITROSTAT) 0.4 MG SL tablet Place 1 tablet under the tongue every 5 minutes as needed for Chest pain up to max of 3 total doses. If no relief after 1 dose, call 911. Qty: 25 tablet, Refills: 3    Associated Diagnoses: Atrial fibrillation, persistent ([address]      ! ! Blood Pressure KIT Dx: HTN. Needs automatic blood pressure machine to monitor her blood pressure. Qty: 1 kit, Refills: 0    Associated Diagnoses: Primary hypertension      nicotine (NICODERM CQ) 14 MG/24HR Place 1 patch onto the skin daily  Qty: 42 patch, Refills: 2    Associated Diagnoses: Personal history of tobacco use      metoprolol succinate (TOPROL XL) 25 MG extended release tablet Take 1 tablet by mouth daily  Qty: 30 tablet, Refills: 3    Associated Diagnoses: Ventricular tachyarrhythmia (HCC)      Elastic Bandages & Supports (KNEE BRACE/HINGED/LARGE) MISC Use daily for knee pain  Qty: 1 each, Refills: 0    Associated Diagnoses: Acute pain of left knee      !! Blood Pressure Monitor KIT TAKE BLOOD PRESSURE DAILY  Qty: 1 kit, Refills: 0       !! - Potential duplicate medications found. Please discuss with provider. ALLERGIES     has No Known Allergies. FAMILY HISTORY     [unfilled]     SOCIAL HISTORY      reports that she has been smoking cigarettes. She has a 7.00 pack-year smoking history. She has never used smokeless tobacco. She reports that she does not drink alcohol and does not use drugs. PHYSICAL EXAM     INITIAL VITALS: BP (!) 113/58   Pulse 55   Temp 96.9 °F (36.1 °C) (Temporal)   Resp 20   Ht 5' 5\" (1.651 m)   Wt 152 lb (68.9 kg)   LMP 04/16/2016   SpO2 96%   BMI 25.29 kg/m²      Physical Exam  Vitals and nursing note reviewed. Constitutional:       General: She is not in acute distress. Appearance: She is well-developed. She is not diaphoretic. HENT:      Head: Normocephalic and atraumatic. Eyes:      General: No scleral icterus. Right eye: No discharge. Left eye: No discharge. Conjunctiva/sclera: Conjunctivae normal.      Pupils: Pupils are equal, round, and reactive to light. Cardiovascular:      Rate and Rhythm: Normal rate and regular rhythm. Heart sounds: Normal heart sounds. No murmur heard. No friction rub. No gallop. Pulmonary:      Effort: Pulmonary effort is normal. No respiratory distress. Breath sounds: Normal breath sounds. No wheezing or rales. Chest:      Chest wall: Tenderness (Right anterior and lateral) present. Abdominal:      General: Bowel sounds are normal. There is no distension. Palpations: Abdomen is soft. There is no mass. Tenderness: There is no abdominal tenderness. There is no guarding or rebound. Musculoskeletal:         General: No tenderness. Normal range of motion. Skin:     General: Skin is warm and dry. Coloration: Skin is not pale. Findings: No erythema or rash. Neurological:      Mental Status: She is alert and oriented to person, place, and time. Cranial Nerves: No cranial nerve deficit. Sensory: No sensory deficit. Motor: No abnormal muscle tone. Coordination: Coordination normal.      Deep Tendon Reflexes: Reflexes normal.   Psychiatric:         Behavior: Behavior normal.         Thought Content: Thought content normal.         Judgment: Judgment normal.       MEDICAL DECISION MAKING:     Patient symptoms I believe are unlikely to be cardiac in nature as she is extremely tender to palpation of the chest especially lateral.  We will do a full cardiac work-up and get an x-ray.   No signs or symptoms of a PE.    DIAGNOSTIC RESULTS     EKG: All EKG's are interpreted by the Emergency Department Physician who either signs or Co-signs this chart in the absence of a cardiologist.    EKG Interpretation    Interpreted by emergency department physician    Rhythm: normal sinus   Rate: normal  Axis: normal  Ectopy: none  Conduction: normal  ST Segments: nonspecific changes  T Waves: non specific changes  Q Waves: none    Clinical Impression: EKG: normal sinus rhythm, nonspecific ST and T waves changes, unchanged from previous tracings. Kayley Galeana MD      RADIOLOGY:All plain film, CT, MRI, and formal ultrasound images (except ED bedside ultrasound)are read by the radiologist and interpretations are directly viewed by the emergency physician. XR CHEST PORTABLE    Result Date: 8/27/2022  EXAMINATION: ONE XRAY VIEW OF THE CHEST 8/27/2022 8:49 pm COMPARISON: 10/09/2021 HISTORY: ORDERING SYSTEM PROVIDED HISTORY: Chest Pain TECHNOLOGIST PROVIDED HISTORY: Chest Pain Reason for Exam: Chest pain starting in the middle of the chest and leading down the right arm. FINDINGS: The lungs are clear. The cardiac and mediastinal contours are normal.  There is no pleural effusion or pneumothorax. No acute osseous abnormality is identified. No acute cardiopulmonary abnormality. LABS: All lab results were reviewed bymyself, and all abnormals are listed below.   Labs Reviewed   BASIC METABOLIC PANEL - Abnormal; Notable for the following components:       Result Value    Glucose 128 (*)     All other components within normal limits   CBC WITH AUTO DIFFERENTIAL - Abnormal; Notable for the following components:    RDW 15.7 (*)     Platelets 365 (*)     All other components within normal limits   TROP/MYOGLOBIN - Abnormal; Notable for the following components:    Myoglobin <21 (*)     All other components within normal limits   TROP/MYOGLOBIN - Abnormal; Notable for the following components:    Myoglobin <21 (*)     All other components within normal limits         EMERGENCY DEPARTMENT COURSE:   Vitals:    Vitals:    08/27/22 2029 08/27/22 2147 08/27/22 2231   BP: 123/63 126/62 (!) 113/58   Pulse: 72 66 55   Resp: 20 22 20   Temp: 96.9 °F (36.1 °C)     TempSrc: Temporal     SpO2: 95% 97% 96%   Weight: 152 lb (68.9 kg)     Height: 5' 5\" (1.651 m)         The patient was given the following medications while in the emergency department:     Orders Placed This Encounter   Medications    ketorolac (TORADOL) injection 30 mg       -------------------------  11:02 PM EDT  Patient was updated on results and reevaluated and still has the tenderness to the chest wall. At this point time I am comfortable stating this patient is having chest wall pain and not cardiac pain and therefore I am okay letting her go home since her work-up is negative. CRITICAL CARE:   None    CONSULTS:  None    PROCEDURES:  none    FINAL IMPRESSION      1.  Chest wall pain          DISPOSITION/PLAN   DISPOSITION Decision To Discharge 08/27/2022 11:01:25 PM      PATIENT REFERRED TO:  Paul Olivia MD  [address] [facility] [address]  [address] [address]    In 1 week      [facility] ED  [facility] [address]  [address] [place] [id number]  [phone]    If symptoms worsen    DISCHARGE MEDICATIONS:  Current Discharge Medication List          (Please note that portions of this note were completed with a voice recognition program.  Efforts were made to edit the dictations but occasionally words are mis-transcribed.)    Reina Diaz MD  Attending Gretchen Diehl MD  08/27/22 [id number]

## 2022-08-29 LAB
EKG ATRIAL RATE: 65 BPM
EKG P AXIS: 74 DEGREES
EKG P-R INTERVAL: 160 MS
EKG Q-T INTERVAL: 440 MS
EKG QRS DURATION: 90 MS
EKG QTC CALCULATION (BAZETT): 457 MS
EKG R AXIS: 54 DEGREES
EKG T AXIS: 58 DEGREES
EKG VENTRICULAR RATE: 65 BPM

## 2022-08-29 PROCEDURE — 93010 ELECTROCARDIOGRAM REPORT: CPT | Performed by: INTERNAL MEDICINE

## 2022-09-01 DIAGNOSIS — E78.2 MIXED HYPERLIPIDEMIA: ICD-10-CM

## 2022-09-01 DIAGNOSIS — I47.20 VENTRICULAR TACHYARRHYTHMIA: ICD-10-CM

## 2022-09-01 RX ORDER — ATORVASTATIN CALCIUM 40 MG/1
40 TABLET, FILM COATED ORAL DAILY
Qty: 30 TABLET | Refills: 0 | Status: SHIPPED | OUTPATIENT
Start: 2022-09-01 | End: 2022-10-24

## 2022-09-01 RX ORDER — ASPIRIN 81 MG/1
TABLET ORAL
Qty: 90 TABLET | Refills: 0 | Status: SHIPPED | OUTPATIENT
Start: 2022-09-01

## 2022-09-01 RX ORDER — METOPROLOL SUCCINATE 25 MG/1
25 TABLET, EXTENDED RELEASE ORAL DAILY
Qty: 30 TABLET | Refills: 0 | Status: SHIPPED | OUTPATIENT
Start: 2022-09-01 | End: 2022-11-08

## 2022-09-09 ENCOUNTER — TELEPHONE (OUTPATIENT)
Dept: FAMILY MEDICINE CLINIC | Age: 57
End: 2022-09-09

## 2022-09-09 DIAGNOSIS — K59.00 CONSTIPATION, UNSPECIFIED CONSTIPATION TYPE: Primary | ICD-10-CM

## 2022-09-09 RX ORDER — POLYETHYLENE GLYCOL 3350 17 G/17G
17 POWDER, FOR SOLUTION ORAL DAILY PRN
Qty: 238 G | Refills: 3 | Status: SHIPPED | OUTPATIENT
Start: 2022-09-09

## 2022-09-09 NOTE — TELEPHONE ENCOUNTER
Pt called in stating she has severe diarrhea, constipation and IBS and is requesting an order for miralax to be submitted. Writer adv pt to submit E-visit and also provided pt username and rest password for pt to login to My Chart.

## 2022-09-14 ENCOUNTER — HOSPITAL ENCOUNTER (OUTPATIENT)
Dept: INFUSION THERAPY | Age: 57
Setting detail: INFUSION SERIES
Discharge: HOME | End: 2022-09-14
Payer: MEDICARE

## 2022-09-14 DIAGNOSIS — D45 POLYCYTHEMIA VERA (HCC): ICD-10-CM

## 2022-09-14 LAB
ABSOLUTE EOS #: 0.3 K/UL (ref 0–0.4)
ABSOLUTE LYMPH #: 3.5 K/UL (ref 1–4.8)
ABSOLUTE MONO #: 0.6 K/UL (ref 0.1–1.3)
BASOPHILS # BLD: 1 % (ref 0–2)
BASOPHILS ABSOLUTE: 0.2 K/UL (ref 0–0.2)
EOSINOPHILS RELATIVE PERCENT: 3 % (ref 0–4)
HCT VFR BLD CALC: 42.2 % (ref 36–46)
HEMOGLOBIN: 14.1 G/DL (ref 12–16)
LYMPHOCYTES # BLD: 29 % (ref 24–44)
MCH RBC QN AUTO: 29.8 PG (ref 26–34)
MCHC RBC AUTO-ENTMCNC: 33.3 G/DL (ref 31–37)
MCV RBC AUTO: 89.3 FL (ref 80–100)
MONOCYTES # BLD: 5 % (ref 1–7)
PDW BLD-RTO: 14.9 % (ref 11.5–14.9)
PLATELET # BLD: 638 K/UL (ref 150–450)
PMV BLD AUTO: 7.8 FL (ref 6–12)
RBC # BLD: 4.72 M/UL (ref 4–5.2)
SEG NEUTROPHILS: 62 % (ref 36–66)
SEGMENTED NEUTROPHILS ABSOLUTE COUNT: 7.4 K/UL (ref 1.3–9.1)
WBC # BLD: 12.1 K/UL (ref 3.5–11)

## 2022-09-14 PROCEDURE — 85025 COMPLETE CBC W/AUTO DIFF WBC: CPT

## 2022-09-14 PROCEDURE — 36415 COLL VENOUS BLD VENIPUNCTURE: CPT

## 2022-09-14 NOTE — PROGRESS NOTES
Pt arrived for therapeutic phlebotomy. Labs drawn. Hct = 42.2, therefore procedure NOT indicated per written parameters. Pt discharged home without further interventions, ambulatory per self.

## 2022-09-15 DIAGNOSIS — J41.0 SIMPLE CHRONIC BRONCHITIS (HCC): ICD-10-CM

## 2022-09-28 ENCOUNTER — OFFICE VISIT (OUTPATIENT)
Dept: FAMILY MEDICINE CLINIC | Age: 57
End: 2022-09-28
Payer: MEDICARE

## 2022-09-28 VITALS
SYSTOLIC BLOOD PRESSURE: 116 MMHG | HEART RATE: 68 BPM | TEMPERATURE: 97.9 F | HEIGHT: 65 IN | BODY MASS INDEX: 25.49 KG/M2 | WEIGHT: 153 LBS | OXYGEN SATURATION: 97 % | DIASTOLIC BLOOD PRESSURE: 72 MMHG

## 2022-09-28 DIAGNOSIS — D45 POLYCYTHEMIA VERA (HCC): ICD-10-CM

## 2022-09-28 DIAGNOSIS — I48.19 ATRIAL FIBRILLATION, PERSISTENT (HCC): ICD-10-CM

## 2022-09-28 DIAGNOSIS — Z15.89 JAK-2 GENE MUTATION: ICD-10-CM

## 2022-09-28 DIAGNOSIS — R73.03 PREDIABETES: ICD-10-CM

## 2022-09-28 DIAGNOSIS — E78.2 MIXED HYPERLIPIDEMIA: ICD-10-CM

## 2022-09-28 DIAGNOSIS — J41.0 SIMPLE CHRONIC BRONCHITIS (HCC): ICD-10-CM

## 2022-09-28 DIAGNOSIS — I10 PRIMARY HYPERTENSION: Primary | ICD-10-CM

## 2022-09-28 DIAGNOSIS — E04.1 THYROID NODULE: ICD-10-CM

## 2022-09-28 DIAGNOSIS — Z87.891 PERSONAL HISTORY OF TOBACCO USE: ICD-10-CM

## 2022-09-28 DIAGNOSIS — F32.5 MAJOR DEPRESSIVE DISORDER WITH SINGLE EPISODE, IN FULL REMISSION (HCC): ICD-10-CM

## 2022-09-28 PROBLEM — Q42.3 IMPERFORATE ANUS: Status: RESOLVED | Noted: 2020-10-13 | Resolved: 2022-09-28

## 2022-09-28 PROCEDURE — G8419 CALC BMI OUT NRM PARAM NOF/U: HCPCS | Performed by: FAMILY MEDICINE

## 2022-09-28 PROCEDURE — 3017F COLORECTAL CA SCREEN DOC REV: CPT | Performed by: FAMILY MEDICINE

## 2022-09-28 PROCEDURE — G8427 DOCREV CUR MEDS BY ELIG CLIN: HCPCS | Performed by: FAMILY MEDICINE

## 2022-09-28 PROCEDURE — 4004F PT TOBACCO SCREEN RCVD TLK: CPT | Performed by: FAMILY MEDICINE

## 2022-09-28 PROCEDURE — 3023F SPIROM DOC REV: CPT | Performed by: FAMILY MEDICINE

## 2022-09-28 PROCEDURE — 99214 OFFICE O/P EST MOD 30 MIN: CPT | Performed by: FAMILY MEDICINE

## 2022-09-28 RX ORDER — ALBUTEROL SULFATE 90 UG/1
AEROSOL, METERED RESPIRATORY (INHALATION)
Qty: 18 G | Refills: 2 | Status: SHIPPED | OUTPATIENT
Start: 2022-09-28

## 2022-09-28 ASSESSMENT — ENCOUNTER SYMPTOMS
ABDOMINAL PAIN: 0
WHEEZING: 0
COLOR CHANGE: 0
TROUBLE SWALLOWING: 0
RHINORRHEA: 0
DIARRHEA: 0
STRIDOR: 0
SHORTNESS OF BREATH: 0
BACK PAIN: 1
CONSTIPATION: 0
EYE REDNESS: 0
NAUSEA: 0
SORE THROAT: 0
ABDOMINAL DISTENTION: 0
RECTAL PAIN: 0
COUGH: 0
CHEST TIGHTNESS: 0
BLOOD IN STOOL: 0
SINUS PRESSURE: 0
VOMITING: 0

## 2022-09-28 ASSESSMENT — PATIENT HEALTH QUESTIONNAIRE - PHQ9
SUM OF ALL RESPONSES TO PHQ9 QUESTIONS 1 & 2: 1
SUM OF ALL RESPONSES TO PHQ QUESTIONS 1-9: 1
SUM OF ALL RESPONSES TO PHQ QUESTIONS 1-9: 1
7. TROUBLE CONCENTRATING ON THINGS, SUCH AS READING THE NEWSPAPER OR WATCHING TELEVISION: 0
10. IF YOU CHECKED OFF ANY PROBLEMS, HOW DIFFICULT HAVE THESE PROBLEMS MADE IT FOR YOU TO DO YOUR WORK, TAKE CARE OF THINGS AT HOME, OR GET ALONG WITH OTHER PEOPLE: 0
1. LITTLE INTEREST OR PLEASURE IN DOING THINGS: 0
5. POOR APPETITE OR OVEREATING: 0
6. FEELING BAD ABOUT YOURSELF - OR THAT YOU ARE A FAILURE OR HAVE LET YOURSELF OR YOUR FAMILY DOWN: 0
SUM OF ALL RESPONSES TO PHQ QUESTIONS 1-9: 1
2. FEELING DOWN, DEPRESSED OR HOPELESS: 1
SUM OF ALL RESPONSES TO PHQ QUESTIONS 1-9: 1
4. FEELING TIRED OR HAVING LITTLE ENERGY: 0
8. MOVING OR SPEAKING SO SLOWLY THAT OTHER PEOPLE COULD HAVE NOTICED. OR THE OPPOSITE, BEING SO FIGETY OR RESTLESS THAT YOU HAVE BEEN MOVING AROUND A LOT MORE THAN USUAL: 0
3. TROUBLE FALLING OR STAYING ASLEEP: 0
9. THOUGHTS THAT YOU WOULD BE BETTER OFF DEAD, OR OF HURTING YOURSELF: 0

## 2022-09-28 NOTE — PATIENT INSTRUCTIONS
New Updates for [facility] MyChart/ [employer] ([facility]) KHLOE    Thank you for choosing [facility] to give you the best care! [employer] ([facility]) is always trying to think of new ways to help their patients. We are asking all patients to try out the new digital registration that is now available through your [facility] account or the new K[employer], [employer] ([facility]). Via the khloe you're now able to update your personal and registration information prior to your upcoming appointment. This will save you time once you arrive at the office to check-in, not to mention your information remains safe!! Many other perks come from signing up for an account, such as:  Requesting refills  Scheduling an appointment  Completing an E-Visit  Sending a message to the office/provider  Having access to your medication list  Paying your bill/copay prior to your appointment  Scheduling your yearly mammogram  Review your test results    If you are not familiar with [facility] or the [employer] ([facility]) KHLOE, please ask one of us and we will be happy to answer any questions or help you set-up your account.       Your [facility] office,  [place]

## 2022-09-28 NOTE — PROGRESS NOTES
Chief Complaint   Patient presents with    Results     SEEN HEMATOLOGIST     Discuss Labs    Immunizations     NO TO FLU VACCINE         Delma Dunn  here today for follow up on chronic medical problems, go over labs and/or diagnostic studies, and medication refills. Results (SEEN HEMATOLOGIST ), Discuss Labs, and Immunizations (NO TO FLU VACCINE)      HPI: Patient is scheduled for chronic medical problems. Hypertension controlled, patient is on multiple medications also follows with cardiologist.  Patient denies any chest pain shortness of breath. Patient has history of atrial fibrillation heart rate is under control, was started on Eliquis by cardiologist and is also on aspirin. [id number] Score for Atrial Fibrillation Stroke Risk   Risk   Factors  Component Value   C CHF No 0   H HTN Yes 1   A2 Age >= 76 No,  (56 y.o.) 0   D DM No 0   S2 Prior Stroke/TIA No 0   V Vascular Disease No 0   A Age 74-69 No,  (56 y.o.) 0   Sc Sex female 1    [id number]  Score  2   Score last updated 9/28/22 6:85 PM EDT    Click here for a link to the UpToDate guideline \"Atrial Fibrillation: Anticoagulation therapy to prevent embolization    Disclaimer: Risk Score calculation is dependent on accuracy of patient problem list and past encounter diagnosis. Hyperlipidemia on statins recent blood work is within range. She denies any side effects from medication. Patient was diagnosed with polycythemia vera with JAK2 mutation positive, seen by oncologist.  Patient reports she has to go for phlebotomy after every 1 to 2 weeks depending upon her blood work results. Recent blood counts are within range except platelets. Prediabetes is on metformin reports compliance denies any side effects. Does not monitor blood sugars on daily basis previous A1c was in the range of 6. On thyroid nodule which is stable evaluated in the past.      Depression stable not taking any medications.   Patient has stopped reports her symptoms are stable. COPD on inhalers is trying to cut down on smoking. She has cut down to 3 cigarettes/week. Patient has smoking history of 1 pack a day for last 40 years started at the age of 14 is due for CT lung screening. She has CT chest done last year. /72   Pulse 68   Temp 97.9 °F (36.6 °C)   Ht 5' 5\" (1.651 m)   Wt 153 lb (69.4 kg)   LMP 04/16/2016   SpO2 97%   BMI 25.46 kg/m²    Body mass index is 25.46 kg/m². Wt Readings from Last 3 Encounters:   09/28/22 153 lb (69.4 kg)   08/27/22 152 lb (68.9 kg)   08/23/22 152 lb 12.8 oz (69.3 kg)        []Negative depression screening. PHQ Scores 9/28/2022 3/22/2022 1/26/2022 3/13/2020 11/11/2019 8/1/2019 8/13/2018   PHQ2 Score 1 1 0 0 0 0 0   PHQ9 Score 1 1 0 0 0 0 0      [x]1-4 = Minimal depression   []5-9 = Milddepression   []10-14 = Moderate depression   []15-19 = Moderately severe depression   []20-27 = Severe depression    Discussed testing with the patient and all questions fully answered.     Hospital Outpatient Visit on 09/14/2022   Component Date Value Ref Range Status    WBC 09/14/2022 12.1 (A)  3.5 - 11.0 k/uL Final    RBC 09/14/2022 4.72  4.0 - 5.2 m/uL Final    Hemoglobin 09/14/2022 14.1  12.0 - 16.0 g/dL Final    Hematocrit 09/14/2022 42.2  36 - 46 % Final    MCV 09/14/2022 89.3  80 - 100 fL Final    MCH 09/14/2022 29.8  26 - 34 pg Final    MCHC 09/14/2022 33.3  31 - 37 g/dL Final    RDW 09/14/2022 14.9  11.5 - 14.9 % Final    Platelets 35/73/2231 638 (A)  150 - 450 k/uL Final    MPV 09/14/2022 7.8  6.0 - 12.0 fL Final    Seg Neutrophils 09/14/2022 62  36 - 66 % Final    Lymphocytes 09/14/2022 29  24 - 44 % Final    Monocytes 09/14/2022 5  1 - 7 % Final    Eosinophils % 09/14/2022 3  0 - 4 % Final    Basophils 09/14/2022 1  0 - 2 % Final    Segs Absolute 09/14/2022 7.40  1.3 - 9.1 k/uL Final    Absolute Lymph # 09/14/2022 3.50  1.0 - 4.8 k/uL Final    Absolute Mono # 09/14/2022 0.60  0.1 - 1.3 k/uL Final    Absolute Eos # 09/14/2022 0.30  0.0 - 0.4 k/uL Final    Basophils Absolute 09/14/2022 0.20  0.0 - 0.2 k/uL Final         Most recent labs reviewed:     Lab Results   Component Value Date    WBC 12.1 (H) 09/14/2022    HGB 14.1 09/14/2022    HCT 42.2 09/14/2022    MCV 89.3 09/14/2022     (H) 09/14/2022       @BRIEFLAB(NA,K,CL,CO2,BUN,CREATININE,GLUCOSE,CALCIUM)@     Lab Results   Component Value Date    ALT 45 (H) 03/22/2022    AST 36 (H) 03/22/2022    ALKPHOS 122 (H) 03/22/2022    BILITOT 0.40 03/22/2022       Lab Results   Component Value Date    TSH 0.61 07/07/2022       Lab Results   Component Value Date    CHOL 194 07/07/2022    CHOL 210 (H) 01/24/2019    CHOL 303 (H) 08/08/2018     Lab Results   Component Value Date    TRIG 90 07/07/2022    TRIG 99 01/24/2019    TRIG 162 (H) 08/08/2018     Lab Results   Component Value Date    HDL 50 07/07/2022    HDL 39 (L) 01/24/2019    HDL 47 08/08/2018     Lab Results   Component Value Date    LDLCHOLESTEROL 126 07/07/2022    LDLCHOLESTEROL 151 (H) 01/24/2019    LDLCHOLESTEROL 224 (H) 08/08/2018     Lab Results   Component Value Date    VLDL NOT REPORTED 01/24/2019    VLDL NOT REPORTED 08/08/2018    VLDL NOT REPORTED 06/23/2016     Lab Results   Component Value Date    CHOLHDLRATIO 3.9 07/07/2022    CHOLHDLRATIO 5.4 (H) 01/24/2019    CHOLHDLRATIO 6.4 (H) 08/08/2018       Lab Results   Component Value Date    LABA1C 6.2 (H) 07/07/2022       Lab Results   Component Value Date    [id number] 772 07/07/2022       Lab Results   Component Value Date    FOLATE 4.7 (L) 07/07/2022       Lab Results   Component Value Date    IRON 83 07/07/2022    TIBC 301 07/07/2022       Lab Results   Component Value Date    VITD25 23.9 (L) 07/07/2022             Current Outpatient Medications   Medication Sig Dispense Refill    albuterol sulfate HFA (VENTOLIN HFA) 108 (90 Base) MCG/ACT inhaler INHALE 2 PUFFS INTO THE LUNGS EVERY 6 HOURS AS NEEDED FOR WHEEZING 18 g 2    polyethylene glycol (MIRALAX) 17 GM/SCOOP powder Take 17 g by mouth daily as needed (constipation) 238 g 3    metoprolol succinate (TOPROL XL) 25 MG extended release tablet TAKE 1 TABLET BY MOUTH DAILY 30 tablet 0    atorvastatin (LIPITOR) 40 MG tablet TAKE 1 TABLET BY MOUTH DAILY 30 tablet 0    aspirin (ASPIRIN LOW DOSE) 81 MG EC tablet TAKE 1 TABLET BY MOUTH DAILY 90 tablet 0    vitamin D (ERGOCALCIFEROL) 1.25 MG (15329 UT) CAPS capsule Take 1 capsule by mouth once a week 12 capsule 1    metFORMIN (GLUCOPHAGE-XR) 500 MG extended release tablet Take 1 tablet by mouth daily (with breakfast) 30 tablet 3    ELIQUIS 5 MG TABS tablet TAKE 1 TABLET BY MOUTH TWICE DAILY 60 tablet 3    tiotropium (SPIRIVA RESPIMAT) 1.25 MCG/ACT AERS inhaler INHALE 2 PUFFS BY MOUTH EVERY DAY 4 g 2    famotidine (PEPCID) 20 MG tablet TAKE 1 TABLET BY MOUTH TWICE DAILY 60 tablet 3    flecainide (TAMBOCOR) 50 MG tablet       nitroGLYCERIN (NITROSTAT) 0.4 MG SL tablet Place 1 tablet under the tongue every 5 minutes as needed for Chest pain up to max of 3 total doses. If no relief after 1 dose, call 911. 25 tablet 3    Elastic Bandages & Supports (KNEE BRACE/HINGED/LARGE) MISC Use daily for knee pain 1 each 0    Blood Pressure KIT Dx: HTN. Needs automatic blood pressure machine to monitor her blood pressure. (Patient not taking: Reported on 9/28/2022) 1 kit 0    nicotine (NICODERM CQ) 14 MG/24HR Place 1 patch onto the skin daily 42 patch 2    Blood Pressure Monitor KIT TAKE BLOOD PRESSURE DAILY (Patient not taking: Reported on 9/28/2022) 1 kit 0     No current facility-administered medications for this visit.              Social History     Socioeconomic History    Marital status: Single     Spouse name: Not on file    Number of children: Not on file    Years of education: Not on file    Highest education level: Not on file   Occupational History    Not on file   Tobacco Use    Smoking status: Every Day     Packs/day: 1.00     Years: 28.00     Pack years: 28.00     Types: Cigarettes    Smokeless tobacco: Never    Tobacco comments:     down to 3 cigs per day   Vaping Use    Vaping Use: Never used   Substance and Sexual Activity    Alcohol use: No     Alcohol/week: 0.0 standard drinks    Drug use: No    Sexual activity: Not on file   Other Topics Concern    Not on file   Social History Narrative    Not on file     Social Determinants of Health     Financial Resource Strain: High Risk    Difficulty of Paying Living Expenses: Hard   Food Insecurity: Food Insecurity Present    Worried About Running Out of Food in the Last Year: Often true    Ran Out of Food in the Last Year: Often true   Transportation Needs: Not on file   Physical Activity: Not on file   Stress: Not on file   Social Connections: Not on file   Intimate Partner Violence: Not on file   Housing Stability: Not on file     Ready to quit: Not Answered  Counseling given: Not Answered  Tobacco comments: down to 3 cigs per day        Family History   Problem Relation Age of Onset    Asthma Father     Cancer Father         colon    Diabetes Father     COPD Father     Heart Failure Father     Seizures Mother              -rest of complaints with corresponding details per ROS    The patient's past medical, surgical, social, and family history as well as her current medications and allergies were reviewed as documented intoday's encounter. Review of Systems   Constitutional:  Positive for activity change. Negative for appetite change, fatigue, fever and unexpected weight change. HENT:  Negative for congestion, ear pain, postnasal drip, rhinorrhea, sinus pressure, sore throat and trouble swallowing. Eyes:  Negative for redness and visual disturbance. Respiratory:  Negative for cough, chest tightness, shortness of breath, wheezing and stridor. Cardiovascular:  Negative for chest pain, palpitations and leg swelling.    Gastrointestinal:  Negative for abdominal distention, abdominal pain, blood in stool, constipation, diarrhea, nausea, rectal pain and vomiting. Endocrine: Negative for polydipsia, polyphagia and polyuria. Genitourinary:  Negative for difficulty urinating, flank pain, frequency and urgency. Musculoskeletal:  Positive for arthralgias and back pain. Negative for gait problem, myalgias and neck pain. Skin:  Negative for color change, rash and wound. Allergic/Immunologic: Positive for immunocompromised state. Negative for food allergies. Neurological:  Positive for weakness and numbness. Negative for dizziness, speech difficulty, light-headedness and headaches. Psychiatric/Behavioral:  Negative for agitation, behavioral problems, decreased concentration, dysphoric mood, hallucinations, sleep disturbance and suicidal ideas. The patient is nervous/anxious. Physical Exam  Vitals and nursing note reviewed. Constitutional:       General: She is not in acute distress. Appearance: Normal appearance. She is well-developed. She is not diaphoretic. HENT:      Head: Normocephalic and atraumatic. Nose: Nose normal.   Eyes:      General:         Right eye: No discharge. Left eye: No discharge. Extraocular Movements: Extraocular movements intact. Conjunctiva/sclera: Conjunctivae normal.      Pupils: Pupils are equal, round, and reactive to light. Neck:      Thyroid: No thyromegaly. Cardiovascular:      Rate and Rhythm: Normal rate and regular rhythm. Heart sounds: Normal heart sounds. No murmur heard. Pulmonary:      Effort: Pulmonary effort is normal. No respiratory distress. Breath sounds: Normal breath sounds. No wheezing or rhonchi. Abdominal:      General: Bowel sounds are normal. There is no distension. Palpations: Abdomen is soft. There is no mass. Tenderness: There is no abdominal tenderness. There is no right CVA tenderness, left CVA tenderness, guarding or rebound. Musculoskeletal:         General: No tenderness.       Cervical back: Normal range of motion and neck supple. Spasms present. No rigidity. Thoracic back: No spasms. Normal range of motion. Lumbar back: Spasms present. No tenderness or bony tenderness. Decreased range of motion. Right lower leg: No edema. Left lower leg: No edema. Lymphadenopathy:      Cervical: No cervical adenopathy. Skin:     Coloration: Skin is not jaundiced or pale. Findings: No bruising, erythema or rash. Neurological:      General: No focal deficit present. Mental Status: She is alert and oriented to person, place, and time. Cranial Nerves: No cranial nerve deficit. Sensory: No sensory deficit. Motor: No weakness or tremor. Coordination: Coordination normal.      Gait: Gait and tandem walk normal.      Deep Tendon Reflexes: Reflexes are normal and symmetric. Psychiatric:         Attention and Perception: Attention and perception normal. She is attentive. Mood and Affect: Mood is anxious. Mood is not depressed. Affect is not tearful. Speech: She is communicative. Speech is not rapid and pressured, delayed or slurred. Behavior: Behavior normal. Behavior is not agitated or slowed. Thought Content: Thought content normal.         Judgment: Judgment normal.           ASSESSMENT AND PLAN      1. Primary hypertension  Controlled, continue beta-blockers, continue low-salt diet monitor blood pressure at home. 2. Polycythemia vera ([facility] [address]  Newly diagnosed, on weekly phlebotomies follows with oncologist   Weekly blood work    3. KIKA-2 gene mutation  Weekly blood work follow-up with oncologist, routine phlebotomies    4. Atrial fibrillation, persistent (HCC)  Rate is under control continue beta-blockers and flecainide Eliquis  Continue statins  5. Prediabetes  Continue metformin A1c is stable recheck in 2 weeks  - Hemoglobin A1C; Future    6. Thyroid nodule  Stable evaluated in the past    7.  Mixed hyperlipidemia  Stable continue statins continue to work on diet and physical activity  8. Simple chronic bronchitis (HCC)  Stable continue albuterol inhaler as needed continue to work on smoking  - albuterol sulfate HFA (VENTOLIN HFA) 108 (90 Base) MCG/ACT inhaler; INHALE 2 PUFFS INTO THE LUNGS EVERY 6 HOURS AS NEEDED FOR WHEEZING  Dispense: 18 g; Refill: 2    9. Major depressive disorder with single episode, in full remission ([facility] [address]  Stable off of medications continue laxation techniques    10. Personal history of tobacco use  Continue to work on smoking schedule CT lung screening  - CT LUNG SCREENING; Future    Orders Placed This Encounter   Procedures    CT LUNG SCREENING     Standing Status:   Future     Standing Expiration Date:   9/28/2023     Order Specific Question:   Is there documentation of shared decision making? Answer:   Yes     Order Specific Question:   Does the patient show any signs or symptoms of lung cancer? Answer:   No     Order Specific Question:   Is this the first (baseline) CT or an annual exam?     Answer: Annual [2]     Order Specific Question:   Is this a low dose CT or a routine CT? Answer:   Low Dose CT [1]     Order Specific Question:   Smoking Status? Answer:   Every Day [1]     Order Specific Question:   Smoking packs per day? Answer:   1     Order Specific Question:   Years smoking? Answer:   40    Hemoglobin A1C     Standing Status:   Future     Standing Expiration Date:   9/28/2023         Medications Discontinued During This Encounter   Medication Reason    VENTOLIN  (90 Base) MCG/ACT inhaler REORDER       Ewa Covert received counseling on the following healthy behaviors: nutrition, exercise, and medication adherence  Reviewed prior labs and health maintenance  Continue current medications, diet and exercise. Discussed use, benefit, and side effects of prescribed medications. Barriers to medication compliance addressed.    Patient given educational materials - see patient instructions  Was a self-tracking handout given in paper form or via [place]? Yes    Requested Prescriptions     Signed Prescriptions Disp Refills    albuterol sulfate HFA (VENTOLIN HFA) 108 (90 Base) MCG/ACT inhaler 18 g 2     Sig: INHALE 2 PUFFS INTO THE LUNGS EVERY 6 HOURS AS NEEDED FOR WHEEZING       All patient questions answered. Patient voiced understanding. Quality Measures    Body mass index is 25.46 kg/m². Elevated. Weight control planned discussed Healthy diet and regular exercise. BP: 116/72 Blood pressure is Normal. Treatment plan consists of Increased Physical Activity, Avoid Tobacco and Second-hand Smoke, and No treatment change needed. Lab Results   Component Value Date    LDLCHOLESTEROL 126 07/07/2022    (goal LDL reduction with dx if diabetes is 50% LDL reduction)      PHQ Scores 9/28/2022 3/22/2022 1/26/2022 3/13/2020 11/11/2019 8/1/2019 8/13/2018   PHQ2 Score 1 1 0 0 0 0 0   PHQ9 Score 1 1 0 0 0 0 0     Interpretation of Total Score Depression Severity: 1-4 = Minimal depression, 5-9 = Mild depression, 10-14 = Moderate depression, 15-19 = Moderately severe depression, 20-27 = Severe depression    The patient'spast medical, surgical, social, and family history as well as her   current medications and allergies were reviewed as documented in today's encounter. Medications, labs, diagnostic studies, consultations andfollow-up as documented in this encounter. Return in about 3 months (around 12/28/2022) for , pap 30min. Patient wasseen with total face to face time of 35 minutes. More than 50% of this visit was counseling and education.        Future Appointments   Date Time Provider Tiki Lewis   9/30/2022 11:15 AM Norman Meléndez MD [facility]   10/12/2022  1:00 PM [facility] SHORT STAY INFUSION CHAIR 01 NICKI Reyes   11/9/2022  1:00 PM [facility] SHORT STAY INFUSION CHAIR 01 NICKI ORTIZ [facility]   12/14/2022  1:00  [facility] 01 [address] [place] [address] Trudi Fernandez   1/4/2023  2:30 PM Arnulfo Duval MD [facility]     This note was completed by using the assistance of a speech-recognition program. However, inadvertent computerized transcription errors may be present. Althoughevery effort was made to ensure accuracy, no guarantees can be provided that every mistake has been identified and corrected by editing.   Electronically signed by Arnulfo Duval MD on 9/28/2022  2:12 PM

## 2022-09-28 NOTE — PROGRESS NOTES
Visit Information    Have you changed or started any medications since your last visit including any over-the-counter medicines, vitamins, or herbal medicines? no   Have you stopped taking any of your medications? Is so, why? -  no  Are you having any side effects from any of your medications? - no    Have you seen any other physician or provider since your last visit? yes  Have you had any other diagnostic tests since your last visit? yes -    Have you been seen in the emergency room and/or had an admission in a hospital since we last saw you?  yes -    Have you had your routine dental cleaning in the past 6 months?  no     Do you have an active MyChart account? If no, what is the barrier?   Yes    Patient Care Team:  Arnulfo Duval MD as PCP - General (Family Medicine)  Arnulfo Duval MD as PCP - [facility]  Lior Jones MD as Surgeon (Colon and Rectal Surgery)    Medical History Review  Past Medical, Family, and Social History reviewed and does contribute to the patient presenting condition    Health Maintenance   Topic Date Due    COVID-19 Vaccine (1) Never done    Colorectal Cancer Screen  08/30/2019    Cervical cancer screen  08/23/2021    Flu vaccine (1) Never done    Depression Monitoring  03/22/2023    A1C test (Diabetic or Prediabetic)  07/07/2023    Lipids  07/07/2023    Breast cancer screen  03/30/2024    DTaP/Tdap/Td vaccine (2 - Td or Tdap) 10/14/2027    Pneumococcal 0-64 years Vaccine  Completed    Hepatitis C screen  Completed    HIV screen  Completed    Hepatitis A vaccine  Aged Out    Hepatitis B vaccine  Aged Out    Hib vaccine  Aged Out    Meningococcal (ACWY) vaccine  Aged Out

## 2022-09-30 ENCOUNTER — OFFICE VISIT (OUTPATIENT)
Dept: ONCOLOGY | Age: 57
End: 2022-09-30
Payer: MEDICARE

## 2022-09-30 VITALS
WEIGHT: 153.6 LBS | TEMPERATURE: 97.8 F | HEART RATE: 60 BPM | DIASTOLIC BLOOD PRESSURE: 81 MMHG | SYSTOLIC BLOOD PRESSURE: 136 MMHG | BODY MASS INDEX: 25.56 KG/M2

## 2022-09-30 DIAGNOSIS — D45 POLYCYTHEMIA VERA (HCC): Primary | ICD-10-CM

## 2022-09-30 DIAGNOSIS — D47.1 MPN (MYELOPROLIFERATIVE NEOPLASM) (HCC): ICD-10-CM

## 2022-09-30 PROCEDURE — G8419 CALC BMI OUT NRM PARAM NOF/U: HCPCS | Performed by: INTERNAL MEDICINE

## 2022-09-30 PROCEDURE — 4004F PT TOBACCO SCREEN RCVD TLK: CPT | Performed by: INTERNAL MEDICINE

## 2022-09-30 PROCEDURE — G8427 DOCREV CUR MEDS BY ELIG CLIN: HCPCS | Performed by: INTERNAL MEDICINE

## 2022-09-30 PROCEDURE — 99214 OFFICE O/P EST MOD 30 MIN: CPT | Performed by: INTERNAL MEDICINE

## 2022-09-30 PROCEDURE — 3017F COLORECTAL CA SCREEN DOC REV: CPT | Performed by: INTERNAL MEDICINE

## 2022-10-07 ENCOUNTER — TELEPHONE (OUTPATIENT)
Dept: ONCOLOGY | Age: 57
End: 2022-10-07

## 2022-10-07 NOTE — LETTER
10/7/2022        0 [facility],[address] [place] [address]    Dear Odessa Pope: Your healthcare provider has ordered a low dose CT scan of the chest for lung cancer screening. Enclosed you will find information about CT lung screening and smoking cessation resources. If you are unable to keep you appointment for you CT lung screening, please call our scheduling department at [phone]    Keep in mind that CT lung screening does not take the place of smoking cessation. Please do not hesitate to contact me if you have any questions or concerns.     [address] [facility],      [facility] Lung Screening Program  [phone]

## 2022-10-13 DIAGNOSIS — J41.0 SIMPLE CHRONIC BRONCHITIS (HCC): ICD-10-CM

## 2022-10-14 RX ORDER — TIOTROPIUM BROMIDE INHALATION SPRAY 1.56 UG/1
SPRAY, METERED RESPIRATORY (INHALATION)
Qty: 4 G | Refills: 2 | Status: SHIPPED | OUTPATIENT
Start: 2022-10-14

## 2022-10-23 ENCOUNTER — HOSPITAL ENCOUNTER (EMERGENCY)
Age: 57
Discharge: HOME | End: 2022-10-23
Attending: EMERGENCY MEDICINE
Payer: MEDICARE

## 2022-10-23 ENCOUNTER — APPOINTMENT (OUTPATIENT)
Dept: GENERAL RADIOLOGY | Age: 57
End: 2022-10-23
Payer: MEDICARE

## 2022-10-23 VITALS
HEIGHT: 65 IN | DIASTOLIC BLOOD PRESSURE: 57 MMHG | HEART RATE: 72 BPM | BODY MASS INDEX: 25.49 KG/M2 | OXYGEN SATURATION: 95 % | TEMPERATURE: 97.7 F | WEIGHT: 153 LBS | SYSTOLIC BLOOD PRESSURE: 121 MMHG | RESPIRATION RATE: 16 BRPM

## 2022-10-23 DIAGNOSIS — S80.02XA CONTUSION OF LEFT KNEE, INITIAL ENCOUNTER: Primary | ICD-10-CM

## 2022-10-23 PROBLEM — D47.1 MPN (MYELOPROLIFERATIVE NEOPLASM) (HCC): Status: ACTIVE | Noted: 2022-10-23

## 2022-10-23 PROCEDURE — 99283 EMERGENCY DEPT VISIT LOW MDM: CPT

## 2022-10-23 PROCEDURE — 73564 X-RAY EXAM KNEE 4 OR MORE: CPT

## 2022-10-23 ASSESSMENT — ENCOUNTER SYMPTOMS
ABDOMINAL DISTENTION: 0
DIARRHEA: 0
STRIDOR: 0
VOMITING: 0
TROUBLE SWALLOWING: 0
WHEEZING: 0
EYE DISCHARGE: 0
APNEA: 0
BACK PAIN: 0
COLOR CHANGE: 0
NAUSEA: 0
VOICE CHANGE: 0

## 2022-10-23 ASSESSMENT — PAIN DESCRIPTION - ORIENTATION: ORIENTATION: LEFT

## 2022-10-23 ASSESSMENT — PAIN SCALES - GENERAL: PAINLEVEL_OUTOF10: 4

## 2022-10-23 ASSESSMENT — PAIN - FUNCTIONAL ASSESSMENT: PAIN_FUNCTIONAL_ASSESSMENT: 0-10

## 2022-10-23 ASSESSMENT — PAIN DESCRIPTION - FREQUENCY: FREQUENCY: CONTINUOUS

## 2022-10-23 ASSESSMENT — PAIN DESCRIPTION - LOCATION: LOCATION: KNEE

## 2022-10-23 ASSESSMENT — PAIN DESCRIPTION - DESCRIPTORS: DESCRIPTORS: ACHING

## 2022-10-23 NOTE — PROGRESS NOTES
Change phlebotomy change phlebotomy to every 4 weeks      Patient ID: Evelynn Schaumann, 1965, [id number], 56 y.o. Referred by :  No ref. provider found   Reason for consultation: JAK2 mutation positive      HISTORY OF PRESENT ILLNESS:    Oncologic History:    Evelynn Schaumann is a very pleasant 56 y.o. female was referred for high hemoglobin hematocrit and positive JAK2 mutation also had platelet elevated at 601 WBC fluctuating up and down, reviewing labs for the last 3 years she had high hematocrit since at least  with rising platelet count and fluctuating neutrophilia, patient denied history of stroke or thrombosis or myocardial infarction,she had diabetes and she is on nitroglycerin, she does have history of V. tach and stress test was negative and on Eliquis for A.  Fib  Bone marrow biopsy and aspirate was done and there was increased megakaryocyte with hyperlobulated    Interim history  Hematocrit down to normal and a platelet rising  Patient tolerated phlebotomy however she did not require phlebotomy recently      Past Medical History:   Diagnosis Date    Chronic kidney disease     CKD (chronic kidney disease) stage 1, GFR 90 ml/min or greater     COPD (chronic obstructive pulmonary disease) (HCC)     Depression     Hyperlipidemia     Hypocalcemia     IBS (irritable bowel syndrome)     Polycythemia vera ([facility] [address] 2022    Primary hypertension 3/22/2022    Proteinuria        Past Surgical History:   Procedure Laterality Date    BREAST BIOPSY       SECTION      CS x 2    COLONOSCOPY      CT BONE MARROW BIOPSY  2022    CT BONE MARROW BIOPSY 2022 [facility] CT SCAN    TUBAL LIGATION Bilateral        No Known Allergies    Current Outpatient Medications   Medication Sig Dispense Refill    albuterol sulfate HFA (VENTOLIN HFA) 108 (90 Base) MCG/ACT inhaler INHALE 2 PUFFS INTO THE LUNGS EVERY 6 HOURS AS NEEDED FOR WHEEZING 18 g 2    polyethylene glycol (MIRALAX) 17 GM/SCOOP powder Take 17 g by mouth daily as needed (constipation) 238 g 3    metoprolol succinate (TOPROL XL) 25 MG extended release tablet TAKE 1 TABLET BY MOUTH DAILY 30 tablet 0    atorvastatin (LIPITOR) 40 MG tablet TAKE 1 TABLET BY MOUTH DAILY 30 tablet 0    aspirin (ASPIRIN LOW DOSE) 81 MG EC tablet TAKE 1 TABLET BY MOUTH DAILY 90 tablet 0    vitamin D (ERGOCALCIFEROL) 1.25 MG (49297 UT) CAPS capsule Take 1 capsule by mouth once a week 12 capsule 1    metFORMIN (GLUCOPHAGE-XR) 500 MG extended release tablet Take 1 tablet by mouth daily (with breakfast) 30 tablet 3    ELIQUIS 5 MG TABS tablet TAKE 1 TABLET BY MOUTH TWICE DAILY 60 tablet 3    famotidine (PEPCID) 20 MG tablet TAKE 1 TABLET BY MOUTH TWICE DAILY 60 tablet 3    flecainide (TAMBOCOR) 50 MG tablet       nitroGLYCERIN (NITROSTAT) 0.4 MG SL tablet Place 1 tablet under the tongue every 5 minutes as needed for Chest pain up to max of 3 total doses. If no relief after 1 dose, call 911. 25 tablet 3    Blood Pressure KIT Dx: HTN. Needs automatic blood pressure machine to monitor her blood pressure. 1 kit 0    Elastic Bandages & Supports (KNEE BRACE/HINGED/LARGE) MISC Use daily for knee pain 1 each 0    Blood Pressure Monitor KIT TAKE BLOOD PRESSURE DAILY 1 kit 0    SPIRIVA RESPIMAT 1.25 MCG/ACT AERS inhaler INHALE 2 PUFFS BY MOUTH EVERY DAY 4 g 2    nicotine (NICODERM CQ) 14 MG/24HR Place 1 patch onto the skin daily 42 patch 2     No current facility-administered medications for this visit.        Social History     Socioeconomic History    Marital status: Single     Spouse name: Not on file    Number of children: Not on file    Years of education: Not on file    Highest education level: Not on file   Occupational History    Not on file   Tobacco Use    Smoking status: Every Day     Packs/day: 1.00     Years: 28.00     Pack years: 28.00     Types: Cigarettes    Smokeless tobacco: Never    Tobacco comments:     down to 3 cigs per day   Vaping Use    Vaping Use: Never used   Substance and Vitals:    09/30/22 1130   BP: 136/81   Pulse: 60   Temp: 97.8 °F (36.6 °C)       PHYSICAL EXAM:   General appearance - well appearing, no in pain or distress   Mental status - alert and cooperative   Eyes - pupils equal and reactive, extraocular eye movements intact   Ears - bilateral TM's and external ear canals normal   Mouth - mucous membranes moist, pharynx normal without lesions   Neck - supple, no significant adenopathy   Lymphatics - no palpable lymphadenopathy, no hepatosplenomegaly   Chest - clear to auscultation, no wheezes, rales or rhonchi, symmetric air entry   Heart - normal rate, regular rhythm, normal S1, S2, no murmurs, rubs, clicks or gallops   Abdomen - soft, nontender, nondistended, no masses or organomegaly   Neurological - alert, oriented, normal speech, no focal findings or movement disorder noted   Musculoskeletal - no joint tenderness, deformity or swelling   Extremities - peripheral pulses normal, no pedal edema, no clubbing or cyanosis   Skin - normal coloration and turgor, no rashes, no suspicious skin lesions noted ,      LABORATORY DATA:     Lab Results   Component Value Date    WBC 12.1 (H) 09/14/2022    HGB 14.1 09/14/2022    HCT 42.2 09/14/2022    MCV 89.3 09/14/2022     (H) 09/14/2022    LYMPHOPCT 29 09/14/2022    RBC 4.72 09/14/2022    MCH 29.8 09/14/2022    MCHC 33.3 09/14/2022    RDW 14.9 09/14/2022    MONOPCT 5 09/14/2022    BASOPCT 1 09/14/2022    NEUTROABS 7.40 09/14/2022    LYMPHSABS 3.50 09/14/2022    MONOSABS 0.60 09/14/2022    EOSABS 0.30 09/14/2022    BASOSABS 0.20 09/14/2022         Chemistry        Component Value Date/Time     08/27/2022 2111    K 3.8 08/27/2022 2111     08/27/2022 2111    CO2 26 08/27/2022 2111    BUN 11 08/27/2022 2111    CREATININE 0.66 08/27/2022 2111        Component Value Date/Time    CALCIUM 9.2 08/27/2022 2111    ALKPHOS 122 (H) 03/22/2022 1256    AST 36 (H) 03/22/2022 1256    ALT 45 (H) 03/22/2022 1256    BILITOT 0.40 03/22/2022 1256            PATHOLOGY DATA:         IMAGING DATA:      XR CHEST PORTABLE  Narrative: EXAMINATION:  ONE XRAY VIEW OF THE CHEST    8/27/2022 8:49 pm    COMPARISON:  10/09/2021    HISTORY:  ORDERING SYSTEM PROVIDED HISTORY: Chest Pain  TECHNOLOGIST PROVIDED HISTORY:  Chest Pain  Reason for Exam: Chest pain starting in the middle of the chest and leading  down the right arm. FINDINGS:  The lungs are clear. The cardiac and mediastinal contours are normal.  There  is no pleural effusion or pneumothorax. No acute osseous abnormality is  identified. Impression: No acute cardiopulmonary abnormality. ASSESSMENT:       Diagnosis Orders   1. Polycythemia vera ([facility] [address]        2. MPN (myeloproliferative neoplasm) (HCC)               MPN with positive JAK2 mutation(PV/ET)  History of A. fib on Eliquis  Diabetes and hypertension    PLAN:     I reviewed the labs available to me,outside records and discussed with the patient. I explained to the patient the nature of this hematologic problem.  I explained the significance of these abnormalities and possible etiology and management options   Patient had JAK2 mutation positive MPN compatible with polycythemia vera with high platelet it is difficult to differentiate if this is purely PV versus ET however under category of JAK2 mutation MPN and the treatment in this case would be phlebotomy to keep hematocrit below 43,this is considered low risk PV/ET with her age below 63 and absent of thrombotic event, recommend add aspirin low-dose 81 mg, control risk factors of CAD, no indication for Hydrea  I did review the bone marrow biopsy with the pathologist and its difficult to tell if its polycythemia vera versus essential thrombocythemia however it came under category JAK2 positive MPN  Explained to the patient that Eliquis and aspirin increased risk of bleed will monitor her closely  Will hold phlebotomy      Thank you for the consult [facility]                            This note is created with the assistance of a speech recognition program.  While intending to generate a document that actually reflects the content of the visit, the document can still have some errors including those of syntax and sound a like substitutions which may escape proof reading. It such instances, actual meaning can be extrapolated by contextual diversion.

## 2022-10-23 NOTE — Clinical Note
Ryne Pompa was seen and treated in our emergency department on 10/23/2022. She may return to work on 10/25/2022. If you have any questions or concerns, please don't hesitate to call.       Lennox Burn, MD

## 2022-10-24 DIAGNOSIS — E78.2 MIXED HYPERLIPIDEMIA: ICD-10-CM

## 2022-10-24 RX ORDER — ATORVASTATIN CALCIUM 40 MG/1
40 TABLET, FILM COATED ORAL DAILY
Qty: 30 TABLET | Refills: 0 | Status: SHIPPED | OUTPATIENT
Start: 2022-10-24

## 2022-10-24 NOTE — DISCHARGE INSTRUCTIONS
Thank you for visiting [facility] Emergency Department. You need to call Beto Pearl MD to make an appointment as directed for follow up. Should you have any questions regarding your care or further treatment, please call [facility] Emergency Department at [phone]. You were evaluated in the emergency department after he fell on your left knee from standing height. X-rays of your knee were negative. If your pain persists and gets any worse please return for follow-up x-rays. Please follow-up with your primary care physician regardless to ensure that there is continuation and continuity of care. Please use the crutches until you are able to ambulate with 2 layers that any difficulty.

## 2022-10-24 NOTE — ED PROVIDER NOTES
[address] ED  eMERGENCY dEPARTMENT eNCOUnter   Attending Attestation     Pt Name: Johnnie Dsouza  MRN: [id number]  [place] 1965  Date of evaluation: 10/23/22       Johnnie Dsouza is a 56 y.o. female who presents with Fall and Knee Pain      History:   Patient had a mechanical fall directly onto her left knee. Patient is having moderate pain in that area. Exam: Vitals:   Vitals:    10/23/22 2140 10/23/22 2143   BP:  (!) 121/57   Pulse:  72   Resp:  16   Temp:  97.7 °F (36.5 °C)   TempSrc:  Oral   SpO2:  95%   Weight: 153 lb (69.4 kg)    Height: 5' 5\" (1.651 m)      Contusion to the anterior knee with tenderness palpation with decreased range of motion no crepitus no deformity. I performed a history and physical examination of the patient and discussed management with the resident. I reviewed the residents note and agree with the documented findings and plan of care. Any areas of disagreement are noted on the chart. I was personally present for the key portions of any procedures. I have documented in the chart those procedures where I was not present during the key portions. I have personally reviewed all images and agree with the resident's interpretation. I have reviewed the emergency nurses triage note. I agree with the chief complaint, past medical history, past surgical history, allergies, medications, social and family history as documented unless otherwise noted below. Documentation of the HPI, Physical Exam and Medical Decision Making performed by medical students or scribes is based on my personal performance of the HPI, PE and MDM. I personally evaluated and examined the patient in conjunction with the APC and agree with the assessment, treatment plan, and disposition of the patient as recorded by the APC. Additional findings are as noted.     Trudi Henry MD  Attending Emergency  Physician             Meryl Massey MD  10/23/22 [id number]

## 2022-10-24 NOTE — ED PROVIDER NOTES
[address] ED  Emergency Department Encounter  Emergency Medicine Resident     Pt Name: Ozell Galeazzi  [id number]  [place] 1965  Date of evaluation: 10/23/22  PCP:  Ryne Hyman MD    [address]       Chief Complaint   Patient presents with    Fall    Knee Pain       HISTORY OF PRESENT ILLNESS  (Location/Symptom, Timing/Onset, Context/Setting, Quality, Duration, ModifyingFactors, Severity.)      Ozell Galeazzi is a 56 y.o. female presents to the emergency department for evaluation of left knee pain. Patient had a mechanical fall and fell directly onto her patella several hours before coming to the emergency department. Patient states that she has had knee pain for a very long time and has had different problems with that particular knee. Patient states that she has struggled to walk ever since she fell onto the knee. Patient states that she did use ice and elevation however denies using any pain medication. Patient denying the desire to have any pain medication as he reported this evening. Patient states that the pain is a 4 out of 10 but does become a 10 out of 10 when pressure is placed posteriorly on the leg. PAST MEDICAL / SURGICAL / SOCIAL /FAMILY HISTORY      has a past medical history of Chronic kidney disease, CKD (chronic kidney disease) stage 1, GFR 90 ml/min or greater, COPD (chronic obstructive pulmonary disease) ([address] Depression, Hyperlipidemia, Hypocalcemia, IBS (irritable bowel syndrome), Polycythemia vera ([address] Primary hypertension, and Proteinuria. No other pertinent PMH on review with patient/guardian. has a past surgical history that includes  section; Tubal ligation (Bilateral); Colonoscopy; Breast biopsy; and CT BIOPSY BONE MARROW (2022). No other pertinent PSH on review with patient/guardian.   Social History     Socioeconomic History    Marital status: Single     Spouse name: Not on file    Number of children: Not on file    Years of education: Not on file    Highest education level: Not on file   Occupational History    Not on file   Tobacco Use    Smoking status: Every Day     Packs/day: 1.00     Years: 28.00     Pack years: 28.00     Types: Cigarettes    Smokeless tobacco: Never    Tobacco comments:     down to 3 cigs per day   Vaping Use    Vaping Use: Never used   Substance and Sexual Activity    Alcohol use: No     Alcohol/week: 0.0 standard drinks    Drug use: No    Sexual activity: Not on file   Other Topics Concern    Not on file   Social History Narrative    Not on file     Social Determinants of Health     Financial Resource Strain: High Risk    Difficulty of Paying Living Expenses: Hard   Food Insecurity: Food Insecurity Present    Worried About Running Out of Food in the Last Year: Often true    Ran Out of Food in the Last Year: Often true   Transportation Needs: Not on file   Physical Activity: Not on file   Stress: Not on file   Social Connections: Not on file   Intimate Partner Violence: Not on file   Housing Stability: Not on file       I counseled the patient against using tobacco products. Family History   Problem Relation Age of Onset    Asthma Father     Cancer Father         colon    Diabetes Father     COPD Father     Heart Failure Father     Seizures Mother      No other pertinent FamHx on review with patient/guardian. Allergies:  Patient has no known allergies. Home Medications:  Prior to Admission medications    Medication Sig Start Date End Date Taking?  Authorizing Provider   SPIRIVA RESPIMAT 1.25 MCG/ACT AERS inhaler INHALE 2 PUFFS BY MOUTH EVERY DAY 10/14/22   Taiwo Scherer MD   albuterol sulfate HFA (VENTOLIN HFA) 108 (90 Base) MCG/ACT inhaler INHALE 2 PUFFS INTO THE LUNGS EVERY 6 HOURS AS NEEDED FOR WHEEZING 9/28/22   Taiwo Scherer MD   polyethylene glycol (MIRALAX) 17 GM/SCOOP powder Take 17 g by mouth daily as needed (constipation) 9/9/22   Taiwo Scherer MD   metoprolol succinate (TOPROL XL) 25 MG extended release tablet TAKE 1 TABLET BY MOUTH DAILY 9/1/22   Read MD Shanna   atorvastatin (LIPITOR) 40 MG tablet TAKE 1 TABLET BY MOUTH DAILY 9/1/22   Read MD Shanna   aspirin (ASPIRIN LOW DOSE) 81 MG EC tablet TAKE 1 TABLET BY MOUTH DAILY 9/1/22   Read MD Shanna   vitamin D (ERGOCALCIFEROL) 1.25 MG (07368 UT) CAPS capsule Take 1 capsule by mouth once a week 7/12/22   Read MD Shanna   metFORMIN (GLUCOPHAGE-XR) 500 MG extended release tablet Take 1 tablet by mouth daily (with breakfast) 7/12/22   Read MD Shanna   ELIQUIS 5 MG TABS tablet TAKE 1 TABLET BY MOUTH TWICE DAILY 7/1/22   Read MD Shanna   famotidine (PEPCID) 20 MG tablet TAKE 1 TABLET BY MOUTH TWICE DAILY 7/1/22   Read MD Shanna   flecainide (TAMBOCOR) 50 MG tablet  3/21/22   Historical Provider, MD   nitroGLYCERIN (NITROSTAT) 0.4 MG SL tablet Place 1 tablet under the tongue every 5 minutes as needed for Chest pain up to max of 3 total doses. If no relief after 1 dose, call 911. 3/22/22   Read MD Shanna   Blood Pressure KIT Dx: HTN. Needs automatic blood pressure machine to monitor her blood pressure. 3/22/22   Read MD Shanna   nicotine (NICODERM CQ) 14 MG/24HR Place 1 patch onto the skin daily 3/22/22 8/17/22  Read MD Shanna   Elastic Bandages & Supports (KNEE BRACE/HINGED/LARGE) MISC Use daily for knee pain 6/6/19   Read MD Shanna   Blood Pressure Monitor KIT TAKE BLOOD PRESSURE DAILY 2/11/16   Geeta Barajas MD       REVIEW OF SYSTEMS    (2-9 systems for level 4, 10 ormore for level 5)      Review of Systems   Constitutional:  Negative for activity change, appetite change and fever. HENT:  Negative for congestion, tinnitus, trouble swallowing and voice change. Eyes:  Negative for discharge and visual disturbance. Respiratory:  Negative for apnea, wheezing and stridor. Cardiovascular:  Negative for chest pain and palpitations.    Gastrointestinal:  Negative for abdominal distention, diarrhea, nausea and vomiting. Endocrine: Negative for cold intolerance and heat intolerance. Genitourinary:  Negative for difficulty urinating and flank pain. Musculoskeletal:  Positive for arthralgias and gait problem. Negative for back pain, joint swelling, myalgias, neck pain and neck stiffness. Skin:  Negative for color change, pallor, rash and wound. Neurological:  Negative for dizziness and numbness. Psychiatric/Behavioral:  Negative for agitation, behavioral problems and confusion. PHYSICAL EXAM   (up to 7 for level 4, 8 or more for level 5)      INITIAL VITALS:   BP (!) 121/57   Pulse 72   Temp 97.7 °F (36.5 °C) (Oral)   Resp 16   Ht 5' 5\" (1.651 m)   Wt 153 lb (69.4 kg)   LMP 04/16/2016   SpO2 95%   BMI 25.46 kg/m²     Physical Exam  Vitals reviewed. HENT:      Head: Normocephalic and atraumatic. Nose: Nose normal.      Mouth/Throat:      Mouth: Mucous membranes are moist.      Pharynx: Oropharynx is clear. Eyes:      Extraocular Movements: Extraocular movements intact. Conjunctiva/sclera: Conjunctivae normal.      Pupils: Pupils are equal, round, and reactive to light. Cardiovascular:      Rate and Rhythm: Normal rate and regular rhythm. Pulses: Normal pulses. Heart sounds: Normal heart sounds. Pulmonary:      Effort: Pulmonary effort is normal.      Breath sounds: Normal breath sounds. Abdominal:      General: Abdomen is flat. There is no distension. Palpations: Abdomen is soft. Tenderness: There is no abdominal tenderness. There is no guarding. Musculoskeletal:         General: Tenderness present. Cervical back: Normal range of motion and neck supple. Comments: Significant tenderness on the left knee with posterior drawer test.  Valgus and varus stress test negative, anterior drawer test negative. Lachemann test negative. Skin:     General: Skin is warm and dry.       Comments: Mild ecchymosis and abrasion over the left kneecap   Neurological: General: No focal deficit present. Mental Status: She is alert. Mental status is at baseline. Psychiatric:         Mood and Affect: Mood normal.       DIFFERENTIAL  DIAGNOSIS     DDX: Knee contusion, knee fracture, patella injury, ligament injury    PLAN (LABS / IMAGING / EKG):  Orders Placed This Encounter   Procedures    XR KNEE LEFT (MIN 4 VIEWS)    [employer] Crutches; Pair, Left Side Injury; Med (5'2\"-5'10\")       MEDICATIONS ORDERED:  No orders of the defined types were placed in this encounter. DIAGNOSTIC RESULTS / EMERGENCY DEPARTMENT COURSE / MDM     LABS:  No results found for this visit on 10/23/22. IMPRESSION/MDM/ED COURSE:  56 y.o. female presented with pain in her left knee after a mechanical fall. Patient did fall directly onto the patella. We will get a 4 view x-ray of the knee for evaluation of possible injury. The patella is not high riding or low riding. Low concern at this time for ligamentous tear. Patient denying wanting to have any pain medication. Patient agreeable to having lidocaine patch before she leaves if she is going to leave. Patient is unsteady on her feet, will give crutches if patient is to be discharged. ED Course as of 10/23/22 2326   Teressa Greco Oct 23, 2022   2326 X-rays are negative. We will discharge the patient at this time. [ES]      ED Course User Index  [ES] Lennox Burn, MD       Patient/Guardian requesting discharge. Patient/Guardian was given written and verbal instructions prior to discharge. Patient/Guardian understood and agreed. Patient/Guardian had no further questions. RADIOLOGY:  XR KNEE LEFT (MIN 4 VIEWS)   Final Result   No acute osseous abnormality evident. Follow-up or additional imaging should be considered if pain persists or   worsens.                EKG  None    All EKG's are interpreted by the Emergency Department Physician who either signs or Co-signs this chart in the absence of a cardiologist.      PROCEDURES:  None    CONSULTS:  None        FINAL IMPRESSION      1.  Contusion of left knee, initial encounter          DISPOSITION / PLAN       DISPOSITION Decision To Discharge 10/23/2022 11:25:09 PM        PATIENT REFERREDTO:  Sebas Wiley MD  [address] [place] [phone]    Schedule an appointment as soon as possible for a visit   As needed    [facility] ED  [place] [id number]  [phone]  Go to   As needed    DISCHARGE MEDICATIONS:  New Prescriptions    No medications on file       Daniel Fitch MD  PGY 3  Resident Physician Emergency Medicine  10/23/22 11:26 PM        (Please note that portions of this note were completed with a voice recognition program.Efforts were made to edit the dictations but occasionally words are mis-transcribed.)       Daniel Fitch MD  Resident  10/23/22 2963       Daniel Fitch MD  Resident  10/23/22 0612

## 2022-10-24 NOTE — ED NOTES
This nurse to bedside to explain crutch use with satisfactory teach-back.       Yamila Reyes RN  10/23/22 1404

## 2022-11-01 DIAGNOSIS — I47.20 VENTRICULAR TACHYARRHYTHMIA: ICD-10-CM

## 2022-11-01 RX ORDER — APIXABAN 5 MG/1
TABLET, FILM COATED ORAL
Qty: 60 TABLET | Refills: 3 | Status: SHIPPED | OUTPATIENT
Start: 2022-11-01

## 2022-11-04 ENCOUNTER — TELEPHONE (OUTPATIENT)
Dept: ONCOLOGY | Age: 57
End: 2022-11-04

## 2022-11-04 ENCOUNTER — HOSPITAL ENCOUNTER (OUTPATIENT)
Age: 57
Discharge: HOME | End: 2022-11-04
Payer: MEDICARE

## 2022-11-04 ENCOUNTER — OFFICE VISIT (OUTPATIENT)
Dept: ONCOLOGY | Age: 57
End: 2022-11-04
Payer: MEDICARE

## 2022-11-04 VITALS
TEMPERATURE: 96.9 F | BODY MASS INDEX: 23.85 KG/M2 | DIASTOLIC BLOOD PRESSURE: 76 MMHG | HEART RATE: 71 BPM | SYSTOLIC BLOOD PRESSURE: 117 MMHG | WEIGHT: 143.3 LBS

## 2022-11-04 DIAGNOSIS — D45 POLYCYTHEMIA VERA (HCC): Primary | ICD-10-CM

## 2022-11-04 DIAGNOSIS — D45 POLYCYTHEMIA VERA (HCC): ICD-10-CM

## 2022-11-04 DIAGNOSIS — R73.03 PREDIABETES: ICD-10-CM

## 2022-11-04 DIAGNOSIS — Z15.89 JAK-2 GENE MUTATION: ICD-10-CM

## 2022-11-04 DIAGNOSIS — D47.1 MPN (MYELOPROLIFERATIVE NEOPLASM) (HCC): ICD-10-CM

## 2022-11-04 PROBLEM — D47.3 ESSENTIAL THROMBOCYTHEMIA (HCC): Status: ACTIVE | Noted: 2022-07-26

## 2022-11-04 LAB
ESTIMATED AVERAGE GLUCOSE: 114 MG/DL
HBA1C MFR BLD: 5.6 % (ref 4–6)
HCT VFR BLD CALC: 47.2 % (ref 36–46)
HEMOGLOBIN: 16 G/DL (ref 12–16)
MCH RBC QN AUTO: 29.6 PG (ref 26–34)
MCHC RBC AUTO-ENTMCNC: 33.8 G/DL (ref 31–37)
MCV RBC AUTO: 87.7 FL (ref 80–100)
PDW BLD-RTO: 14.8 % (ref 11.5–14.9)
PLATELET # BLD: 667 K/UL (ref 150–450)
PMV BLD AUTO: 8 FL (ref 6–12)
RBC # BLD: 5.38 M/UL (ref 4–5.2)
WBC # BLD: 11.3 K/UL (ref 3.5–11)

## 2022-11-04 PROCEDURE — 3074F SYST BP LT 130 MM HG: CPT | Performed by: INTERNAL MEDICINE

## 2022-11-04 PROCEDURE — 85027 COMPLETE CBC AUTOMATED: CPT

## 2022-11-04 PROCEDURE — 3017F COLORECTAL CA SCREEN DOC REV: CPT | Performed by: INTERNAL MEDICINE

## 2022-11-04 PROCEDURE — G8484 FLU IMMUNIZE NO ADMIN: HCPCS | Performed by: INTERNAL MEDICINE

## 2022-11-04 PROCEDURE — 99214 OFFICE O/P EST MOD 30 MIN: CPT | Performed by: INTERNAL MEDICINE

## 2022-11-04 PROCEDURE — 4004F PT TOBACCO SCREEN RCVD TLK: CPT | Performed by: INTERNAL MEDICINE

## 2022-11-04 PROCEDURE — 83036 HEMOGLOBIN GLYCOSYLATED A1C: CPT

## 2022-11-04 PROCEDURE — 36415 COLL VENOUS BLD VENIPUNCTURE: CPT

## 2022-11-04 PROCEDURE — G8420 CALC BMI NORM PARAMETERS: HCPCS | Performed by: INTERNAL MEDICINE

## 2022-11-04 PROCEDURE — G8427 DOCREV CUR MEDS BY ELIG CLIN: HCPCS | Performed by: INTERNAL MEDICINE

## 2022-11-04 PROCEDURE — 3078F DIAST BP <80 MM HG: CPT | Performed by: INTERNAL MEDICINE

## 2022-11-04 NOTE — TELEPHONE ENCOUNTER
Avs from 11/4/22      Phlebotomy every 4 weeks to keep hematocrit below 42  CBC every 4 weeks  RTC in 4 weeks    Phlebotomy on 11/9      Cbc will be drawn week prior     Rv on 12/2/22 @ 10      PT was given AVS and appt schedule      Electronically signed by Gisselle Velasco on 11/4/2022 at 12:19 PM

## 2022-11-04 NOTE — PROGRESS NOTES
Change phlebotomy change phlebotomy to every 4 weeks      Patient ID: Lilian Caldwell, 1965, [id number], 56 y.o. Referred by :  No ref. provider found   Reason for consultation: JAK2 mutation positive      HISTORY OF PRESENT ILLNESS:    Oncologic History:    Lilian Caldwell is a very pleasant 56 y.o. female was referred for high hemoglobin hematocrit and positive JAK2 mutation also had platelet elevated at 601 WBC fluctuating up and down, reviewing labs for the last 3 years she had high hematocrit since at least 2019 with rising platelet count and fluctuating neutrophilia, patient denied history of stroke or thrombosis or myocardial infarction,she had diabetes and she is on nitroglycerin, she does have history of V. tach and stress test was negative and on Eliquis for A. Fib  Bone marrow biopsy and aspirate was done and there was increased megakaryocyte with hyperlobulated    Interim history  Patient presents to the clinic for a follow-up visit and to discuss results of lab work-up and other relevant clinical data. Overall patient has been tolerating treatment without unexpected or severe side effects. During this visit patient's allergy, social, medical, surgical history and medications were reviewed and updated.      Hematocrit and platelet up      Past Medical History:   Diagnosis Date    Chronic kidney disease     CKD (chronic kidney disease) stage 1, GFR 90 ml/min or greater     COPD (chronic obstructive pulmonary disease) (HCC)     Depression     Hyperlipidemia     Hypocalcemia     IBS (irritable bowel syndrome)     Polycythemia vera ([facility] [address] 2022    Primary hypertension 3/22/2022    Proteinuria        Past Surgical History:   Procedure Laterality Date    BREAST BIOPSY       SECTION      CS x 2    COLONOSCOPY      CT BONE MARROW BIOPSY  2022    CT BONE MARROW BIOPSY 2022 [facility] CT SCAN    TUBAL LIGATION Bilateral        No Known Allergies    Current Outpatient Medications Medication Sig Dispense Refill    ELIQUIS 5 MG TABS tablet TAKE 1 TABLET BY MOUTH TWICE DAILY 60 tablet 3    atorvastatin (LIPITOR) 40 MG tablet TAKE 1 TABLET BY MOUTH DAILY 30 tablet 0    SPIRIVA RESPIMAT 1.25 MCG/ACT AERS inhaler INHALE 2 PUFFS BY MOUTH EVERY DAY 4 g 2    albuterol sulfate HFA (VENTOLIN HFA) 108 (90 Base) MCG/ACT inhaler INHALE 2 PUFFS INTO THE LUNGS EVERY 6 HOURS AS NEEDED FOR WHEEZING 18 g 2    polyethylene glycol (MIRALAX) 17 GM/SCOOP powder Take 17 g by mouth daily as needed (constipation) 238 g 3    metoprolol succinate (TOPROL XL) 25 MG extended release tablet TAKE 1 TABLET BY MOUTH DAILY 30 tablet 0    aspirin (ASPIRIN LOW DOSE) 81 MG EC tablet TAKE 1 TABLET BY MOUTH DAILY 90 tablet 0    vitamin D (ERGOCALCIFEROL) 1.25 MG (43948 UT) CAPS capsule Take 1 capsule by mouth once a week 12 capsule 1    metFORMIN (GLUCOPHAGE-XR) 500 MG extended release tablet Take 1 tablet by mouth daily (with breakfast) 30 tablet 3    famotidine (PEPCID) 20 MG tablet TAKE 1 TABLET BY MOUTH TWICE DAILY 60 tablet 3    flecainide (TAMBOCOR) 50 MG tablet       nitroGLYCERIN (NITROSTAT) 0.4 MG SL tablet Place 1 tablet under the tongue every 5 minutes as needed for Chest pain up to max of 3 total doses. If no relief after 1 dose, call 911. 25 tablet 3    Blood Pressure KIT Dx: HTN. Needs automatic blood pressure machine to monitor her blood pressure. 1 kit 0    nicotine (NICODERM CQ) 14 MG/24HR Place 1 patch onto the skin daily 42 patch 2    Elastic Bandages & Supports (KNEE BRACE/HINGED/LARGE) MISC Use daily for knee pain 1 each 0    Blood Pressure Monitor KIT TAKE BLOOD PRESSURE DAILY 1 kit 0     No current facility-administered medications for this visit.        Social History     Socioeconomic History    Marital status: Single     Spouse name: Not on file    Number of children: Not on file    Years of education: Not on file    Highest education level: Not on file   Occupational History    Not on file   Tobacco Use Smoking status: Every Day     Packs/day: 1.00     Years: 28.00     Pack years: 28.00     Types: Cigarettes    Smokeless tobacco: Never    Tobacco comments:     down to 3 cigs per day   Vaping Use    Vaping Use: Never used   Substance and Sexual Activity    Alcohol use: No     Alcohol/week: 0.0 standard drinks    Drug use: No    Sexual activity: Not on file   Other Topics Concern    Not on file   Social History Narrative    Not on file     Social Determinants of Health     Financial Resource Strain: High Risk    Difficulty of Paying Living Expenses: Hard   Food Insecurity: Food Insecurity Present    Worried About Running Out of Food in the Last Year: Often true    Ran Out of Food in the Last Year: Often true   Transportation Needs: Not on file   Physical Activity: Not on file   Stress: Not on file   Social Connections: Not on file   Intimate Partner Violence: Not on file   Housing Stability: Not on file       Family History   Problem Relation Age of Onset    Asthma Father     Cancer Father         colon    Diabetes Father     COPD Father     Heart Failure Father     Seizures Mother         REVIEW OF SYSTEM:     Constitutional: No fever or chills. No night sweats, no weight loss   Eyes: No eye discharge, double vision, or eye pain   HEENT: negative for sore mouth, sore throat, hoarseness and voice change   Respiratory: negative for cough , sputum, dyspnea, wheezing, hemoptysis, chest pain   Cardiovascular: negative for chest pain, dyspnea, palpitations, orthopnea, PND   Gastrointestinal: negative for nausea, vomiting, diarrhea, constipation, abdominal pain, Dysphagia, hematemesis and hematochezia   Genitourinary: negative for frequency, dysuria, nocturia, urinary incontinence, and hematuria   Integument: negative for rash, skin lesions, bruises.    Hematologic/Lymphatic: negative for easy bruising, bleeding, lymphadenopathy, petechiae and swelling/edema   Endocrine: negative for heat or cold intolerance, tremor, weight changes, change in bowel habits and hair loss   Musculoskeletal: negative for myalgias, arthralgias, pain, joint swelling,and bone pain   Neurological: negative for headaches, dizziness, seizures, weakness, numbness       OBJECTIVE:         There were no vitals filed for this visit.       PHYSICAL EXAM:   General appearance - well appearing, no in pain or distress   Mental status - alert and cooperative   Eyes - pupils equal and reactive, extraocular eye movements intact   Ears - bilateral TM's and external ear canals normal   Mouth - mucous membranes moist, pharynx normal without lesions   Neck - supple, no significant adenopathy   Lymphatics - no palpable lymphadenopathy, no hepatosplenomegaly   Chest - clear to auscultation, no wheezes, rales or rhonchi, symmetric air entry   Heart - normal rate, regular rhythm, normal S1, S2, no murmurs, rubs, clicks or gallops   Abdomen - soft, nontender, nondistended, no masses or organomegaly   Neurological - alert, oriented, normal speech, no focal findings or movement disorder noted   Musculoskeletal - no joint tenderness, deformity or swelling   Extremities - peripheral pulses normal, no pedal edema, no clubbing or cyanosis   Skin - normal coloration and turgor, no rashes, no suspicious skin lesions noted ,      LABORATORY DATA:     Lab Results   Component Value Date    WBC 12.1 (H) 09/14/2022    HGB 14.1 09/14/2022    HCT 42.2 09/14/2022    MCV 89.3 09/14/2022     (H) 09/14/2022    LYMPHOPCT 29 09/14/2022    RBC 4.72 09/14/2022    MCH 29.8 09/14/2022    MCHC 33.3 09/14/2022    RDW 14.9 09/14/2022    MONOPCT 5 09/14/2022    BASOPCT 1 09/14/2022    NEUTROABS 7.40 09/14/2022    LYMPHSABS 3.50 09/14/2022    MONOSABS 0.60 09/14/2022    EOSABS 0.30 09/14/2022    BASOSABS 0.20 09/14/2022         Chemistry        Component Value Date/Time     08/27/2022 2111    K 3.8 08/27/2022 2111     08/27/2022 2111    CO2 26 08/27/2022 2111    BUN 11 08/27/2022 2111 CREATININE 0.66 08/27/2022 2111        Component Value Date/Time    CALCIUM 9.2 08/27/2022 2111    ALKPHOS 122 (H) 03/22/2022 1256    AST 36 (H) 03/22/2022 1256    ALT 45 (H) 03/22/2022 1256    BILITOT 0.40 03/22/2022 1256            PATHOLOGY DATA:         IMAGING DATA:      XR KNEE LEFT (MIN 4 VIEWS)  Narrative: EXAMINATION:  FOUR XRAY VIEWS OF THE LEFT KNEE    10/23/2022 10:49 pm    COMPARISON:  None. HISTORY:  ORDERING SYSTEM PROVIDED HISTORY: fall directly on the patella  TECHNOLOGIST PROVIDED HISTORY:  fall directly on the patella  Reason for Exam: PT CO pain in left knee after falling and landing on  anterior knee today. PT states hx chronic pain in this knee before injury. FINDINGS:  Osseous structures of the knee are intact and align normally. No retained  radiopaque foreign body. Joint spaces appear well maintained. Impression: No acute osseous abnormality evident. Follow-up or additional imaging should be considered if pain persists or  worsens. ASSESSMENT:       Diagnosis Orders   1. Polycythemia vera ([facility] [address]        2. MPN (myeloproliferative neoplasm) ([facility]        3. KIKA-2 gene mutation               MPN with positive JAK2 mutation(PV/ET)  History of A. fib on Eliquis  Diabetes and hypertension    PLAN:     I reviewed the labs available to me,outside records and discussed with the patient. I explained to the patient the nature of this hematologic problem.  I explained the significance of these abnormalities and possible etiology and management options   Patient had JAK2 mutation positive MPN compatible with polycythemia vera with high platelet;it is difficult to differentiate if this is purely PV versus ET however under category as JAK2 mutation MPN and the treatment in this case would be phlebotomy to keep hematocrit below 43,this is considered low risk PV/ET with her age below 61 and absent of thrombotic event, recommend continuing aspirin low-dose 81 mg, control risk factors of CAD, no indication for Hydrea(for essential thrombocythemia)  I did review the bone marrow biopsy with the pathologist and its difficult to tell if its polycythemia vera versus essential thrombocythemia however it came under category JAK2 positive MPN  With rising hematocrit will resume phlebotomy to keep hematocrit below 42  Explained to the patient that Eliquis and aspirin increased risk of bleed will monitor her closely  RTC in 4 weeks      Thank you for the consult                                        [facility]                            This note is created with the assistance of a speech recognition program.  While intending to generate a document that actually reflects the content of the visit, the document can still have some errors including those of syntax and sound a like substitutions which may escape proof reading. It such instances, actual meaning can be extrapolated by contextual diversion.

## 2022-11-08 DIAGNOSIS — I47.20 VENTRICULAR TACHYARRHYTHMIA: ICD-10-CM

## 2022-11-08 DIAGNOSIS — R73.03 PREDIABETES: ICD-10-CM

## 2022-11-08 RX ORDER — METFORMIN HYDROCHLORIDE 500 MG/1
TABLET, EXTENDED RELEASE ORAL
Qty: 30 TABLET | Refills: 3 | Status: SHIPPED | OUTPATIENT
Start: 2022-11-08

## 2022-11-08 RX ORDER — METOPROLOL SUCCINATE 25 MG/1
25 TABLET, EXTENDED RELEASE ORAL DAILY
Qty: 30 TABLET | Refills: 0 | Status: SHIPPED | OUTPATIENT
Start: 2022-11-08

## 2022-11-09 ENCOUNTER — HOSPITAL ENCOUNTER (OUTPATIENT)
Dept: INFUSION THERAPY | Age: 57
Setting detail: INFUSION SERIES
Discharge: HOME | End: 2022-11-09
Payer: MEDICARE

## 2022-11-09 VITALS
DIASTOLIC BLOOD PRESSURE: 77 MMHG | HEART RATE: 82 BPM | SYSTOLIC BLOOD PRESSURE: 128 MMHG | TEMPERATURE: 96.6 F | RESPIRATION RATE: 17 BRPM | OXYGEN SATURATION: 93 %

## 2022-11-09 DIAGNOSIS — D45 POLYCYTHEMIA VERA (HCC): Primary | ICD-10-CM

## 2022-11-09 PROCEDURE — 99195 PHLEBOTOMY: CPT

## 2022-11-09 NOTE — PROGRESS NOTES
Pt arrived for therapeutic phlebotomy. Vitals obtained. Labs previously drawn. Hct = 47.2, therefore procedure indicated per written parameters. 500 mL whole blood removed using R arm. Pt tolerated well. Monitored post procedure. No s/s adverse reactions noted. Vitals remained stable. Pt discharged home, ambulatory per self.

## 2022-11-18 DIAGNOSIS — K58.1 IRRITABLE BOWEL SYNDROME WITH CONSTIPATION: ICD-10-CM

## 2022-11-18 RX ORDER — FAMOTIDINE 20 MG/1
TABLET, FILM COATED ORAL
Qty: 60 TABLET | Refills: 3 | Status: SHIPPED | OUTPATIENT
Start: 2022-11-18

## 2022-12-02 ENCOUNTER — OFFICE VISIT (OUTPATIENT)
Dept: ONCOLOGY | Age: 57
End: 2022-12-02
Payer: MEDICARE

## 2022-12-02 ENCOUNTER — TELEPHONE (OUTPATIENT)
Dept: ONCOLOGY | Age: 57
End: 2022-12-02

## 2022-12-02 ENCOUNTER — HOSPITAL ENCOUNTER (OUTPATIENT)
Age: 57
Discharge: HOME | End: 2022-12-02
Payer: MEDICARE

## 2022-12-02 VITALS
BODY MASS INDEX: 25.46 KG/M2 | SYSTOLIC BLOOD PRESSURE: 134 MMHG | WEIGHT: 153 LBS | DIASTOLIC BLOOD PRESSURE: 76 MMHG | TEMPERATURE: 97.6 F | HEART RATE: 79 BPM

## 2022-12-02 DIAGNOSIS — Z15.89 JAK-2 GENE MUTATION: ICD-10-CM

## 2022-12-02 DIAGNOSIS — D45 POLYCYTHEMIA VERA (HCC): ICD-10-CM

## 2022-12-02 DIAGNOSIS — D47.1 MPN (MYELOPROLIFERATIVE NEOPLASM) (HCC): ICD-10-CM

## 2022-12-02 DIAGNOSIS — D45 POLYCYTHEMIA VERA (HCC): Primary | ICD-10-CM

## 2022-12-02 DIAGNOSIS — D72.9 NEUTROPHILIA: ICD-10-CM

## 2022-12-02 LAB
ABSOLUTE EOS #: 0.4 K/UL (ref 0–0.4)
ABSOLUTE LYMPH #: 3.2 K/UL (ref 1–4.8)
ABSOLUTE MONO #: 0.8 K/UL (ref 0.1–1.3)
BASOPHILS # BLD: 1 % (ref 0–2)
BASOPHILS ABSOLUTE: 0.1 K/UL (ref 0–0.2)
EOSINOPHILS RELATIVE PERCENT: 4 % (ref 0–4)
HCT VFR BLD CALC: 44.9 % (ref 36–46)
HEMOGLOBIN: 15 G/DL (ref 12–16)
LYMPHOCYTES # BLD: 30 % (ref 24–44)
MCH RBC QN AUTO: 28.1 PG (ref 26–34)
MCHC RBC AUTO-ENTMCNC: 33.5 G/DL (ref 31–37)
MCV RBC AUTO: 84 FL (ref 80–100)
MONOCYTES # BLD: 7 % (ref 1–7)
PDW BLD-RTO: 14.2 % (ref 11.5–14.9)
PLATELET # BLD: 651 K/UL (ref 150–450)
PMV BLD AUTO: 8.5 FL (ref 6–12)
RBC # BLD: 5.35 M/UL (ref 4–5.2)
SEG NEUTROPHILS: 58 % (ref 36–66)
SEGMENTED NEUTROPHILS ABSOLUTE COUNT: 6.2 K/UL (ref 1.3–9.1)
WBC # BLD: 10.6 K/UL (ref 3.5–11)

## 2022-12-02 PROCEDURE — 85025 COMPLETE CBC W/AUTO DIFF WBC: CPT

## 2022-12-02 PROCEDURE — G8484 FLU IMMUNIZE NO ADMIN: HCPCS | Performed by: INTERNAL MEDICINE

## 2022-12-02 PROCEDURE — 99214 OFFICE O/P EST MOD 30 MIN: CPT | Performed by: INTERNAL MEDICINE

## 2022-12-02 PROCEDURE — G8427 DOCREV CUR MEDS BY ELIG CLIN: HCPCS | Performed by: INTERNAL MEDICINE

## 2022-12-02 PROCEDURE — G8419 CALC BMI OUT NRM PARAM NOF/U: HCPCS | Performed by: INTERNAL MEDICINE

## 2022-12-02 PROCEDURE — 3074F SYST BP LT 130 MM HG: CPT | Performed by: INTERNAL MEDICINE

## 2022-12-02 PROCEDURE — 3017F COLORECTAL CA SCREEN DOC REV: CPT | Performed by: INTERNAL MEDICINE

## 2022-12-02 PROCEDURE — 4004F PT TOBACCO SCREEN RCVD TLK: CPT | Performed by: INTERNAL MEDICINE

## 2022-12-02 PROCEDURE — 3078F DIAST BP <80 MM HG: CPT | Performed by: INTERNAL MEDICINE

## 2022-12-02 PROCEDURE — 36415 COLL VENOUS BLD VENIPUNCTURE: CPT

## 2022-12-02 NOTE — PROGRESS NOTES
Change phlebotomy change phlebotomy to every 4 weeks      Patient ID: Tsering Jacobs, 1965, [id number], 56 y.o. Referred by :  No ref. provider found   Reason for consultation: JAK2 mutation positive      HISTORY OF PRESENT ILLNESS:    Oncologic History:    Tsering Jacobs is a very pleasant 56 y.o. female was referred for high hemoglobin hematocrit and positive JAK2 mutation also had platelet elevated at 601 WBC fluctuating up and down, reviewing labs for the last 3 years she had high hematocrit since at least 2019 with rising platelet count and fluctuating neutrophilia, patient denied history of stroke or thrombosis or myocardial infarction,she had diabetes and she is on nitroglycerin, she does have history of V. tach and stress test was negative and on Eliquis for A. Fib  Bone marrow biopsy and aspirate was done and there was increased megakaryocyte with hyperlobulated    Interim history  Patient presents to the clinic for a follow-up visit and to discuss results of lab work-up and other relevant clinical data. Overall patient has been tolerating treatment without unexpected or severe side effects. During this visit patient's allergy, social, medical, surgical history and medications were reviewed and updated.      Patient on phlebotomy every 4 weeks and the most recent hematocrit 45 with platelet 963      Past Medical History:   Diagnosis Date    Chronic kidney disease     CKD (chronic kidney disease) stage 1, GFR 90 ml/min or greater     COPD (chronic obstructive pulmonary disease) (HCC)     Depression     Hyperlipidemia     Hypocalcemia     IBS (irritable bowel syndrome)     Polycythemia vera ([facility] [address] 2022    Primary hypertension 3/22/2022    Proteinuria        Past Surgical History:   Procedure Laterality Date    BREAST BIOPSY       SECTION      CS x 2    COLONOSCOPY      CT BONE MARROW BIOPSY  2022    CT BONE MARROW BIOPSY 2022 [facility] CT SCAN    TUBAL LIGATION Bilateral No Known Allergies    Current Outpatient Medications   Medication Sig Dispense Refill    famotidine (PEPCID) 20 MG tablet TAKE 1 TABLET BY MOUTH TWICE DAILY 60 tablet 3    metFORMIN (GLUCOPHAGE-XR) 500 MG extended release tablet TAKE 1 TABLET BY MOUTH DAILY WITH BREAKFAST 30 tablet 3    metoprolol succinate (TOPROL XL) 25 MG extended release tablet TAKE 1 TABLET BY MOUTH DAILY 30 tablet 0    ELIQUIS 5 MG TABS tablet TAKE 1 TABLET BY MOUTH TWICE DAILY 60 tablet 3    atorvastatin (LIPITOR) 40 MG tablet TAKE 1 TABLET BY MOUTH DAILY 30 tablet 0    SPIRIVA RESPIMAT 1.25 MCG/ACT AERS inhaler INHALE 2 PUFFS BY MOUTH EVERY DAY 4 g 2    albuterol sulfate HFA (VENTOLIN HFA) 108 (90 Base) MCG/ACT inhaler INHALE 2 PUFFS INTO THE LUNGS EVERY 6 HOURS AS NEEDED FOR WHEEZING 18 g 2    polyethylene glycol (MIRALAX) 17 GM/SCOOP powder Take 17 g by mouth daily as needed (constipation) 238 g 3    aspirin (ASPIRIN LOW DOSE) 81 MG EC tablet TAKE 1 TABLET BY MOUTH DAILY 90 tablet 0    vitamin D (ERGOCALCIFEROL) 1.25 MG (62761 UT) CAPS capsule Take 1 capsule by mouth once a week 12 capsule 1    flecainide (TAMBOCOR) 50 MG tablet       nitroGLYCERIN (NITROSTAT) 0.4 MG SL tablet Place 1 tablet under the tongue every 5 minutes as needed for Chest pain up to max of 3 total doses. If no relief after 1 dose, call 911. 25 tablet 3    Blood Pressure KIT Dx: HTN. Needs automatic blood pressure machine to monitor her blood pressure. 1 kit 0    Elastic Bandages & Supports (KNEE BRACE/HINGED/LARGE) MISC Use daily for knee pain 1 each 0    nicotine (NICODERM CQ) 14 MG/24HR Place 1 patch onto the skin daily 42 patch 2    Blood Pressure Monitor KIT TAKE BLOOD PRESSURE DAILY (Patient not taking: No sig reported) 1 kit 0     No current facility-administered medications for this visit.        Social History     Socioeconomic History    Marital status: Single     Spouse name: Not on file    Number of children: Not on file    Years of education: Not on file    Highest education level: Not on file   Occupational History    Not on file   Tobacco Use    Smoking status: Every Day     Packs/day: 1.00     Years: 28.00     Pack years: 28.00     Types: Cigarettes    Smokeless tobacco: Never    Tobacco comments:     down to 3 cigs per day   Vaping Use    Vaping Use: Never used   Substance and Sexual Activity    Alcohol use: No     Alcohol/week: 0.0 standard drinks    Drug use: No    Sexual activity: Not on file   Other Topics Concern    Not on file   Social History Narrative    Not on file     Social Determinants of Health     Financial Resource Strain: High Risk    Difficulty of Paying Living Expenses: Hard   Food Insecurity: Food Insecurity Present    Worried About Running Out of Food in the Last Year: Often true    Ran Out of Food in the Last Year: Often true   Transportation Needs: Not on file   Physical Activity: Not on file   Stress: Not on file   Social Connections: Not on file   Intimate Partner Violence: Not on file   Housing Stability: Not on file       Family History   Problem Relation Age of Onset    Asthma Father     Cancer Father         colon    Diabetes Father     COPD Father     Heart Failure Father     Seizures Mother         REVIEW OF SYSTEM:     Constitutional: No fever or chills. No night sweats, no weight loss   Eyes: No eye discharge, double vision, or eye pain   HEENT: negative for sore mouth, sore throat, hoarseness and voice change   Respiratory: negative for cough , sputum, dyspnea, wheezing, hemoptysis, chest pain   Cardiovascular: negative for chest pain, dyspnea, palpitations, orthopnea, PND   Gastrointestinal: negative for nausea, vomiting, diarrhea, constipation, abdominal pain, Dysphagia, hematemesis and hematochezia   Genitourinary: negative for frequency, dysuria, nocturia, urinary incontinence, and hematuria   Integument: negative for rash, skin lesions, bruises.    Hematologic/Lymphatic: negative for easy bruising, bleeding, lymphadenopathy, petechiae and swelling/edema   Endocrine: negative for heat or cold intolerance, tremor, weight changes, change in bowel habits and hair loss   Musculoskeletal: negative for myalgias, arthralgias, pain, joint swelling,and bone pain   Neurological: negative for headaches, dizziness, seizures, weakness, numbness       OBJECTIVE:         Vitals:    12/02/22 1043   BP: 134/76   Pulse: 79   Temp: 97.6 °F (36.4 °C)         PHYSICAL EXAM:   General appearance - well appearing, no in pain or distress   Mental status - alert and cooperative   Eyes - pupils equal and reactive, extraocular eye movements intact   Ears - bilateral TM's and external ear canals normal   Mouth - mucous membranes moist, pharynx normal without lesions   Neck - supple, no significant adenopathy   Lymphatics - no palpable lymphadenopathy, no hepatosplenomegaly   Chest -bilateral wheezing  Heart - normal rate, regular rhythm, normal S1, S2, no murmurs, rubs, clicks or gallops   Abdomen - soft, nontender, nondistended, no masses or organomegaly   Neurological - alert, oriented, normal speech, no focal findings or movement disorder noted   Musculoskeletal - no joint tenderness, deformity or swelling   Extremities - peripheral pulses normal, no pedal edema, no clubbing or cyanosis   Skin - normal coloration and turgor, no rashes, no suspicious skin lesions noted ,      LABORATORY DATA:     Lab Results   Component Value Date    WBC 11.3 (H) 11/04/2022    HGB 16.0 11/04/2022    HCT 47.2 (H) 11/04/2022    MCV 87.7 11/04/2022     (H) 11/04/2022    LYMPHOPCT 29 09/14/2022    RBC 5.38 (H) 11/04/2022    MCH 29.6 11/04/2022    MCHC 33.8 11/04/2022    RDW 14.8 11/04/2022    MONOPCT 5 09/14/2022    BASOPCT 1 09/14/2022    NEUTROABS 7.40 09/14/2022    LYMPHSABS 3.50 09/14/2022    MONOSABS 0.60 09/14/2022    EOSABS 0.30 09/14/2022    BASOSABS 0.20 09/14/2022         Chemistry        Component Value Date/Time     08/27/2022 2111    K 3.8 08/27/2022 2111     08/27/2022 2111    CO2 26 08/27/2022 2111    BUN 11 08/27/2022 2111    CREATININE 0.66 08/27/2022 2111        Component Value Date/Time    CALCIUM 9.2 08/27/2022 2111    ALKPHOS 122 (H) 03/22/2022 1256    AST 36 (H) 03/22/2022 1256    ALT 45 (H) 03/22/2022 1256    BILITOT 0.40 03/22/2022 1256            PATHOLOGY DATA:         IMAGING DATA:      XR KNEE LEFT (MIN 4 VIEWS)  Narrative: EXAMINATION:  FOUR XRAY VIEWS OF THE LEFT KNEE    10/23/2022 10:49 pm    COMPARISON:  None. HISTORY:  ORDERING SYSTEM PROVIDED HISTORY: fall directly on the patella  TECHNOLOGIST PROVIDED HISTORY:  fall directly on the patella  Reason for Exam: PT CO pain in left knee after falling and landing on  anterior knee today. PT states hx chronic pain in this knee before injury. FINDINGS:  Osseous structures of the knee are intact and align normally. No retained  radiopaque foreign body. Joint spaces appear well maintained. Impression: No acute osseous abnormality evident. Follow-up or additional imaging should be considered if pain persists or  worsens. ASSESSMENT:       Diagnosis Orders   1. Polycythemia vera ([address]        2. Neutrophilia        3. KIKA-2 gene mutation        4. MPN (myeloproliferative neoplasm) (HCC)               MPN with positive JAK2 mutation(PV/ET)  History of A. fib on Eliquis  Diabetes and hypertension  Active smoking    PLAN:     I reviewed the labs available to me,outside records and discussed with the patient. I explained to the patient the nature of this hematologic problem.  I explained the significance of these abnormalities and possible etiology and management options   Patient had JAK2 mutation positive MPN compatible with polycythemia vera with high platelet;it is difficult to differentiate if this is purely PV versus ET however under category as JAK2 mutation MPN and the treatment in this case would be phlebotomy to keep hematocrit below 43,this is considered low risk PV/ET with her age below 61 and absent of thrombotic event, recommend continuing aspirin low-dose 81 mg, control risk factors of CAD, no indication for Hydrea(for essential thrombocythemia)  review the bone marrow biopsy with the pathologist and its difficult to tell if its polycythemia vera versus essential thrombocythemia however it came under category JAK2 positive MPN  Continue phlebotomy to keep hematocrit below 42> every 4 weeks as needed for hematocrit above 42  Explained to the patient that Eliquis and aspirin increased risk of bleed will monitor her closely  RTC in 4 weeks  Smoking cessation education      Thank you for the consult                                        [facility]                            This note is created with the assistance of a speech recognition program.  While intending to generate a document that actually reflects the content of the visit, the document can still have some errors including those of syntax and sound a like substitutions which may escape proof reading. It such instances, actual meaning can be extrapolated by contextual diversion.

## 2022-12-02 NOTE — TELEPHONE ENCOUNTER
AVS FROM 12/2/22    Rtc in 4 weeks with repeated lab  Continue phlebotomy q 4 weeks for hct>42    RV on 6/6/23     PT was given AVS and appt schedule    Electronically signed by Clovis Holman on 12/2/2022 at 1:13 PM

## 2022-12-06 DIAGNOSIS — I47.20 VENTRICULAR TACHYARRHYTHMIA: ICD-10-CM

## 2022-12-06 RX ORDER — METOPROLOL SUCCINATE 25 MG/1
25 TABLET, EXTENDED RELEASE ORAL DAILY
Qty: 30 TABLET | Refills: 0 | Status: SHIPPED | OUTPATIENT
Start: 2022-12-06

## 2022-12-13 DIAGNOSIS — E55.9 VITAMIN D DEFICIENCY: ICD-10-CM

## 2022-12-13 RX ORDER — ERGOCALCIFEROL 1.25 MG/1
CAPSULE ORAL
Qty: 12 CAPSULE | Refills: 1 | Status: SHIPPED | OUTPATIENT
Start: 2022-12-13

## 2022-12-18 DIAGNOSIS — E78.2 MIXED HYPERLIPIDEMIA: ICD-10-CM

## 2022-12-19 RX ORDER — ASPIRIN 81 MG/1
TABLET ORAL
Qty: 90 TABLET | Refills: 0 | Status: SHIPPED | OUTPATIENT
Start: 2022-12-19

## 2022-12-19 RX ORDER — ATORVASTATIN CALCIUM 40 MG/1
40 TABLET, FILM COATED ORAL DAILY
Qty: 30 TABLET | Refills: 0 | Status: SHIPPED | OUTPATIENT
Start: 2022-12-19

## 2022-12-19 NOTE — TELEPHONE ENCOUNTER
Please Approve or Refuse.   Send to Pharmacy per Pt's Request:      Next Visit Date:  1/4/2023   Last Visit Date: 9/28/2022    Hemoglobin A1C (%)   Date Value   11/04/2022 5.6   07/07/2022 6.2 (H)   08/01/2019 5.6             ( goal A1C is < 7)   BP Readings from Last 3 Encounters:   12/02/22 134/76   11/09/22 128/77   11/04/22 117/76          (goal 120/80)  BUN   Date Value Ref Range Status   08/27/2022 11 6 - 20 mg/dL Final     Creatinine   Date Value Ref Range Status   08/27/2022 0.66 0.50 - 0.90 mg/dL Final     Potassium   Date Value Ref Range Status   08/27/2022 3.8 3.7 - 5.3 mmol/L Final

## 2022-12-28 DIAGNOSIS — J41.0 SIMPLE CHRONIC BRONCHITIS (HCC): ICD-10-CM

## 2022-12-28 RX ORDER — ALBUTEROL SULFATE 90 UG/1
AEROSOL, METERED RESPIRATORY (INHALATION)
Qty: 18 G | Refills: 2 | Status: SHIPPED | OUTPATIENT
Start: 2022-12-28

## 2023-01-04 ENCOUNTER — HOSPITAL ENCOUNTER (OUTPATIENT)
Dept: INFUSION THERAPY | Age: 58
Setting detail: INFUSION SERIES
Discharge: HOME | End: 2023-01-04
Payer: MEDICARE

## 2023-01-04 ENCOUNTER — HOSPITAL ENCOUNTER (OUTPATIENT)
Age: 58
Setting detail: SPECIMEN
Discharge: HOME | End: 2023-01-04

## 2023-01-04 ENCOUNTER — OFFICE VISIT (OUTPATIENT)
Dept: FAMILY MEDICINE CLINIC | Age: 58
End: 2023-01-04
Payer: MEDICARE

## 2023-01-04 VITALS
DIASTOLIC BLOOD PRESSURE: 68 MMHG | RESPIRATION RATE: 17 BRPM | HEART RATE: 61 BPM | OXYGEN SATURATION: 98 % | TEMPERATURE: 96.5 F | SYSTOLIC BLOOD PRESSURE: 107 MMHG

## 2023-01-04 VITALS
SYSTOLIC BLOOD PRESSURE: 110 MMHG | TEMPERATURE: 97.8 F | DIASTOLIC BLOOD PRESSURE: 60 MMHG | HEART RATE: 67 BPM | OXYGEN SATURATION: 99 % | HEIGHT: 65 IN | WEIGHT: 151 LBS | BODY MASS INDEX: 25.16 KG/M2

## 2023-01-04 DIAGNOSIS — Z23 ENCOUNTER FOR IMMUNIZATION: ICD-10-CM

## 2023-01-04 DIAGNOSIS — D47.1 MPN (MYELOPROLIFERATIVE NEOPLASM) (HCC): ICD-10-CM

## 2023-01-04 DIAGNOSIS — Z15.89 JAK-2 GENE MUTATION: ICD-10-CM

## 2023-01-04 DIAGNOSIS — D45 POLYCYTHEMIA VERA (HCC): Primary | ICD-10-CM

## 2023-01-04 DIAGNOSIS — Z12.4 PAP SMEAR FOR CERVICAL CANCER SCREENING: Primary | ICD-10-CM

## 2023-01-04 LAB
ABSOLUTE EOS #: 0.24 K/UL (ref 0–0.4)
ABSOLUTE LYMPH #: 4.37 K/UL (ref 1–4.8)
ABSOLUTE MONO #: 0.12 K/UL (ref 0.1–1.3)
BASOPHILS # BLD: 0 % (ref 0–2)
BASOPHILS ABSOLUTE: 0 K/UL (ref 0–0.2)
EOSINOPHILS RELATIVE PERCENT: 2 % (ref 0–4)
HCT VFR BLD CALC: 44.2 % (ref 36–46)
HEMOGLOBIN: 14.4 G/DL (ref 12–16)
LYMPHOCYTES # BLD: 37 % (ref 24–44)
MCH RBC QN AUTO: 25.8 PG (ref 26–34)
MCHC RBC AUTO-ENTMCNC: 32.5 G/DL (ref 31–37)
MCV RBC AUTO: 79.3 FL (ref 80–100)
MONOCYTES # BLD: 1 % (ref 1–7)
MORPHOLOGY: ABNORMAL
MORPHOLOGY: ABNORMAL
PDW BLD-RTO: 14.8 % (ref 11.5–14.9)
PLATELET # BLD: 604 K/UL (ref 150–450)
PMV BLD AUTO: 8.1 FL (ref 6–12)
RBC # BLD: 5.57 M/UL (ref 4–5.2)
SEG NEUTROPHILS: 60 % (ref 36–66)
SEGMENTED NEUTROPHILS ABSOLUTE COUNT: 7.07 K/UL (ref 1.3–9.1)
WBC # BLD: 11.8 K/UL (ref 3.5–11)

## 2023-01-04 PROCEDURE — 99396 PREV VISIT EST AGE 40-64: CPT | Performed by: FAMILY MEDICINE

## 2023-01-04 PROCEDURE — 99195 PHLEBOTOMY: CPT

## 2023-01-04 PROCEDURE — G8484 FLU IMMUNIZE NO ADMIN: HCPCS | Performed by: FAMILY MEDICINE

## 2023-01-04 PROCEDURE — 36415 COLL VENOUS BLD VENIPUNCTURE: CPT

## 2023-01-04 PROCEDURE — 3074F SYST BP LT 130 MM HG: CPT | Performed by: FAMILY MEDICINE

## 2023-01-04 PROCEDURE — 85025 COMPLETE CBC W/AUTO DIFF WBC: CPT

## 2023-01-04 PROCEDURE — 3078F DIAST BP <80 MM HG: CPT | Performed by: FAMILY MEDICINE

## 2023-01-04 ASSESSMENT — PATIENT HEALTH QUESTIONNAIRE - PHQ9
SUM OF ALL RESPONSES TO PHQ QUESTIONS 1-9: 1
10. IF YOU CHECKED OFF ANY PROBLEMS, HOW DIFFICULT HAVE THESE PROBLEMS MADE IT FOR YOU TO DO YOUR WORK, TAKE CARE OF THINGS AT HOME, OR GET ALONG WITH OTHER PEOPLE: 0
SUM OF ALL RESPONSES TO PHQ QUESTIONS 1-9: 1
4. FEELING TIRED OR HAVING LITTLE ENERGY: 0
3. TROUBLE FALLING OR STAYING ASLEEP: 0
1. LITTLE INTEREST OR PLEASURE IN DOING THINGS: 0
9. THOUGHTS THAT YOU WOULD BE BETTER OFF DEAD, OR OF HURTING YOURSELF: 0
2. FEELING DOWN, DEPRESSED OR HOPELESS: 1
5. POOR APPETITE OR OVEREATING: 0
SUM OF ALL RESPONSES TO PHQ9 QUESTIONS 1 & 2: 1
7. TROUBLE CONCENTRATING ON THINGS, SUCH AS READING THE NEWSPAPER OR WATCHING TELEVISION: 0
6. FEELING BAD ABOUT YOURSELF - OR THAT YOU ARE A FAILURE OR HAVE LET YOURSELF OR YOUR FAMILY DOWN: 0
8. MOVING OR SPEAKING SO SLOWLY THAT OTHER PEOPLE COULD HAVE NOTICED. OR THE OPPOSITE, BEING SO FIGETY OR RESTLESS THAT YOU HAVE BEEN MOVING AROUND A LOT MORE THAN USUAL: 0

## 2023-01-04 ASSESSMENT — ENCOUNTER SYMPTOMS
SINUS PRESSURE: 0
CONSTIPATION: 0
ABDOMINAL PAIN: 0
SORE THROAT: 0
DIARRHEA: 0
TROUBLE SWALLOWING: 0
COLOR CHANGE: 0
COUGH: 0
EYE REDNESS: 0
BLOOD IN STOOL: 0
BACK PAIN: 0

## 2023-01-04 NOTE — PROGRESS NOTES
2023      Debbie Israel (:  1965) is a 56 y.o. female, here for a preventive medicine evaluation, Gynecologic Exam   .      ASSESSMENT/PLAN:    1. Pap smear for cervical cancer screening    -     PAP Smear; Future  2. Encounter for immunization  Patient refused vaccinations    Low carb, low fat diet, increase fruits and vegetables, and exercise 4-5 times a week 30-40 minutes a day, or walk 1-2 hours per day, or wear a pedometer and get at least 10,000 steps per day. Dental exam 2-3 times /year advised. Immunizations reviewed. Health Maintenance reviewed   Smoking cessation counseling given , down to 3 cigarette      Annual eye exam advised. Eryn Parekh received counseling on the following healthy behaviors: nutrition, exercise, medication adherence, and tobacco cessation  Reviewed prior labs and health maintenance  Discussed use, benefit, and side effects of prescribed medications. Barriers to medication compliance addressed. Patient given educational materials - see patient instructions  All patient questions answered. Patient voiced understanding. The patient's past medical, surgical, social, and family history as well as her  current medications and allergies were reviewed as documented in today's encounter. Medications, labs, diagnostic studies, consultations and follow-up as documented in thisencounter. Return in about 3 months (around 2023) for ,always chronic conditons.     Data Unavailable    Future Appointments   Date Time Provider Tiki Burtoni   2023 11:45 AM John Reeves MD [facility]   2023 11:00 AM [facility] SHORT STAY INFUSION CHAIR 01 [facility]   3/1/2023 11:00 AM [facility] SHORT STAY INFUSION CHAIR 03 [facility]   2023  2:00 PM [facility]           Patient Active Problem List   Diagnosis    IBS (irritable bowel syndrome)    Depression    Proteinuria    CKD (chronic kidney disease) stage 1, GFR 90 ml/min or greater    Mixed hyperlipidemia    Thyroid nodule    Chronic obstructive pulmonary disease (HCC)    Family history of colon cancer    Personal history of tobacco use    Calcification of right breast    Breast calcification, left    Incomplete rectal prolapse-Congenital defect. Surgery at 5 yo    Prediabetes    Acute pain of left knee    Intramural leiomyoma of uterus    PMB (postmenopausal bleeding)    Adrenal adenoma    Endometrial thickening on ultrasound    Adrenal nodule (HCC)    Pain of left lower extremity    Instability of left knee joint    Incontinence of feces    Atrial fibrillation with RVR (HCC)    Primary hypertension    Polycythemia vera (HCC)    Neutrophilia    Essential thrombocythemia (HCC)    KIKA-2 gene mutation    Atrial fibrillation, persistent (HCC)    MPN (myeloproliferative neoplasm) ([facility] [address]       Prior to Visit Medications    Medication Sig Taking?  Authorizing Provider   albuterol sulfate HFA (VENTOLIN HFA) 108 (90 Base) MCG/ACT inhaler INHALE 2 PUFFS INTO THE LUNGS EVERY 6 HOURS AS NEEDED FOR WHEEZING Yes Kinjal Gould MD   atorvastatin (LIPITOR) 40 MG tablet TAKE 1 TABLET BY MOUTH DAILY Yes MAGALIS Morel CNP   aspirin (ASPIRIN LOW DOSE) 81 MG EC tablet TAKE 1 TABLET BY MOUTH DAILY Yes MAGALIS Morel CNP   vitamin D (ERGOCALCIFEROL) 1.25 MG (88647 UT) CAPS capsule TAKE 1 CAPSULE BY MOUTH 1 TIME A WEEK Yes Deepika Del Rio MD   metoprolol succinate (TOPROL XL) 25 MG extended release tablet TAKE 1 TABLET BY MOUTH DAILY Yes Deepika Del Rio MD   famotidine (PEPCID) 20 MG tablet TAKE 1 TABLET BY MOUTH TWICE DAILY Yes Deepika Del Rio MD   metFORMIN (GLUCOPHAGE-XR) 500 MG extended release tablet TAKE 1 TABLET BY MOUTH DAILY WITH BREAKFAST Yes Deepika Del Rio MD   ELIQUIS 5 MG TABS tablet TAKE 1 TABLET BY MOUTH TWICE DAILY Yes Deepika Del Rio MD   SPIRIVA RESPIMAT 1.25 MCG/ACT AERS inhaler INHALE 2 PUFFS BY MOUTH EVERY DAY Yes Deepika Del Rio MD   polyethylene glycol (MIRALAX) 17 GM/SCOOP powder Take 17 g by mouth daily as needed (constipation) Yes Cuco Gardner MD   flecainide (TAMBOCOR) 50 MG tablet  Yes Elisabeth Anderson MD   nitroGLYCERIN (NITROSTAT) 0.4 MG SL tablet Place 1 tablet under the tongue every 5 minutes as needed for Chest pain up to max of 3 total doses. If no relief after 1 dose, call 911. Yes Cuco Gardner MD   Elastic Bandages & Supports (KNEE BRACE/HINGED/LARGE) MISC Use daily for knee pain Yes Cuco Gardner MD   Blood Pressure Monitor KIT TAKE BLOOD PRESSURE DAILY Yes Meng Frey MD   Blood Pressure KIT Dx: HTN. Needs automatic blood pressure machine to monitor her blood pressure.   Patient not taking: Reported on 2023  Cuco Gardner MD   nicotine (NICODERM CQ) 14 MG/24HR Place 1 patch onto the skin daily  Cuco Gardner MD        No Known Allergies    Past Medical History:   Diagnosis Date    Chronic kidney disease     CKD (chronic kidney disease) stage 1, GFR 90 ml/min or greater     COPD (chronic obstructive pulmonary disease) (HCC)     Depression     Hyperlipidemia     Hypocalcemia     IBS (irritable bowel syndrome)     Polycythemia vera ([address] 2022    Primary hypertension 3/22/2022    Proteinuria        Past Surgical History:   Procedure Laterality Date    BREAST BIOPSY       SECTION      CS x 2    COLONOSCOPY      CT BONE MARROW BIOPSY  2022    CT BONE MARROW BIOPSY 2022 [facility] CT SCAN    TUBAL LIGATION Bilateral        .  Social History     Tobacco Use    Smoking status: Every Day     Packs/day: 1.00     Years: 28.00     Pack years: 28.00     Types: Cigarettes    Smokeless tobacco: Never    Tobacco comments:     down to 3 cigs per day   Vaping Use    Vaping Use: Never used   Substance Use Topics    Alcohol use: No     Alcohol/week: 0.0 standard drinks    Drug use: No       Family History   Problem Relation Age of Onset    Asthma Father     Cancer Father         colon    Diabetes Father     COPD Father Heart Failure Father     Seizures Mother        ADVANCE DIRECTIVE: N, <no information>    ELLA / Helene Hicks is here for Gynecologic Exam       Urinary symptoms: no    Sexually active: No:      Contraception: none     last pap: was normal  The patient has yes  history of abnormal PAP    Last mammogram:normal   The patient has no  family history of breast cancer    Wears seatbelts: yes     Regular exercise: no  Ever been transfused or tattooed?: yes  The patient reports that domestic violence in her life is absent. Last eye exam: up to date: Yes       No results found. Hearing:normal :Yes    Last dental exam and preventative dental cleaning: up to date : Yes    Nutritional assessment: Body mass index is 25.13 kg/m². Normal.     Weight is   Wt Readings from Last 3 Encounters:   01/04/23 151 lb (68.5 kg)   12/02/22 153 lb (69.4 kg)   11/04/22 143 lb 4.8 oz (65 kg)       Healthful diet and Physical activity counseling to prevent CVD- low carb, low fat diet, increase fruits and vegetables, and exercise 4-5 times a day 30-40minutes a day discussed    Nicotine dependence. yes -   Smoker, counseling given to quit smoking. Ready to quit: Not Answered  Counseling given: Yes  Tobacco comments: down to 3 cigs per day      Alcohol use: no    Immunization History   Administered Date(s) Administered    Pneumococcal Polysaccharide ([id number]) 08/13/2018    Tdap (Boostrix, Adacel) 10/14/2017       COVID-19 vaccine:  No:      Tdap one time, then Td every 10 years-up to date:  Yes    Influenza annually-up to date:  No:     PPSV 23 in all adults 19-63 yo with chronic conditions,smokers, alcoholism,  nursing home residents; then PCV 13 at 73 yo-up to date: No:  pt refused  or N/A    Menopause: Yes   Patient's last menstrual period was 04/16/2016.      Colon cancer screening recommended at 39years old yes    The patient has no  family history of colon cancer    Blood pressure is normal.  BP Readings from Last 3 Encounters:   01/04/23 110/60   01/04/23 107/68   12/02/22 134/76       Pulse is normal.  Pulse Readings from Last 3 Encounters:   01/04/23 67   01/04/23 61   12/02/22 79       A1c is   Lab Results   Component Value Date    LABA1C 5.6 11/04/2022    LABA1C 6.2 (H) 07/07/2022    LABA1C 5.6 08/01/2019       Lipid screening -    Lab Results   Component Value Date    CHOL 194 07/07/2022    CHOL 210 (H) 01/24/2019    CHOL 303 (H) 08/08/2018     Lab Results   Component Value Date    TRIG 90 07/07/2022    TRIG 99 01/24/2019    TRIG 162 (H) 08/08/2018     Lab Results   Component Value Date    HDL 50 07/07/2022    HDL 39 (L) 01/24/2019    HDL 47 08/08/2018     Lab Results   Component Value Date    LDLCHOLESTEROL 126 07/07/2022    LDLCHOLESTEROL 151 (H) 01/24/2019    LDLCHOLESTEROL 224 (H) 08/08/2018     Lab Results   Component Value Date    CHOLHDLRATIO 3.9 07/07/2022    CHOLHDLRATIO 5.4 (H) 01/24/2019    CHOLHDLRATIO 6.4 (H) 08/08/2018       Cardiovascular risk is: The 10-year ASCVD risk score (Linnette LAU, et al., 2019) is: 3.8%    Values used to calculate the score:      Age: 56 years      Sex: Female      Is Non- : No      Diabetic: No      Tobacco smoker: Yes      Systolic Blood Pressure: 468 mmHg      Is BP treated: No      HDL Cholesterol: 50 mg/dL      Total Cholesterol: 194 mg/dL    Hepatitis C screening-   Lab Results   Component Value Date    HEPCAB NONREACTIVE 07/07/2022            []Negative depression screening.    [x]1-4 = Minimal depression   []5-9 = Mild depression   []10-14 = Moderate depression   []15-19 = Moderately severe depression   []20-27 = Severe depression    PHQ Scores 1/4/2023 9/28/2022 3/22/2022 1/26/2022 3/13/2020 11/11/2019 8/1/2019   PHQ2 Score 1 1 1 0 0 0 0   PHQ9 Score 1 1 1 0 0 0 0       Health Maintenance   Topic Date Due    COVID-19 Vaccine (1) Never done    Shingles vaccine (1 of 2) Never done    Low dose CT lung screening  Never done    Colorectal Cancer Screen  08/30/2019    Cervical cancer screen  08/23/2021    Flu vaccine (1) Never done    Lipids  07/07/2023    Depression Monitoring  09/28/2023    A1C test (Diabetic or Prediabetic)  11/04/2023    Breast cancer screen  03/30/2024    DTaP/Tdap/Td vaccine (2 - Td or Tdap) 10/14/2027    Pneumococcal 0-64 years Vaccine  Completed    Hepatitis C screen  Completed    HIV screen  Completed    Hepatitis A vaccine  Aged Out    Hib vaccine  Aged Out    Meningococcal (ACWY) vaccine  Aged Out       -rest of complaints with corresponding details per ROS    Review of Systems   Constitutional:  Negative for fatigue. HENT:  Negative for sinus pressure, sore throat and trouble swallowing. Eyes:  Negative for redness. Respiratory:  Negative for cough. Cardiovascular:  Negative for chest pain, palpitations and leg swelling. Gastrointestinal:  Negative for abdominal pain, blood in stool, constipation and diarrhea. Genitourinary:  Negative for difficulty urinating, flank pain, frequency, genital sores and urgency. Musculoskeletal:  Negative for arthralgias, back pain, gait problem, myalgias and neck pain. Skin:  Negative for color change, rash and wound. Neurological:  Negative for speech difficulty, weakness, light-headedness, numbness and headaches. Psychiatric/Behavioral:  Negative for agitation, behavioral problems, decreased concentration, dysphoric mood, hallucinations, sleep disturbance and suicidal ideas. The patient is nervous/anxious.        -vital signs stable and within normal limits except     /60   Pulse 67   Temp 97.8 °F (36.6 °C)   Ht 5' 5\" (1.651 m)   Wt 151 lb (68.5 kg)   LMP 04/16/2016   SpO2 99%   BMI 25.13 kg/m²   Vitals:    01/04/23 1429   BP: 110/60   Pulse: 67   Temp: 97.8 °F (36.6 °C)   SpO2: 99%   Weight: 151 lb (68.5 kg)   Height: 5' 5\" (1.651 m)     Estimated body mass index is 25.13 kg/m² as calculated from the following:    Height as of this encounter: 5' 5\" (1.651 m). Weight as of this encounter: 151 lb (68.5 kg). Physical Exam  Vitals and nursing note reviewed. Exam conducted with a chaperone present. Constitutional:       General: She is not in acute distress. Appearance: Normal appearance. She is well-developed. She is not diaphoretic. HENT:      Head: Normocephalic and atraumatic. Nose: Nose normal.   Eyes:      General:         Right eye: No discharge. Left eye: No discharge. Extraocular Movements: Extraocular movements intact. Conjunctiva/sclera: Conjunctivae normal.      Pupils: Pupils are equal, round, and reactive to light. Neck:      Thyroid: No thyromegaly. Cardiovascular:      Rate and Rhythm: Normal rate and regular rhythm. Heart sounds: Normal heart sounds. No murmur heard. Pulmonary:      Effort: Pulmonary effort is normal. No respiratory distress. Breath sounds: Normal breath sounds. No wheezing or rhonchi. Abdominal:      General: Bowel sounds are normal. There is no distension. Palpations: Abdomen is soft. There is no mass. Tenderness: There is no abdominal tenderness. Hernia: There is no hernia in the left inguinal area or right inguinal area. Genitourinary:     Exam position: Lithotomy position. Labia:         Right: No rash. Left: No rash. Comments: Cervix is small, no vaginal discharge seen , no cervical motion tenderness . Musculoskeletal:         General: No tenderness. Cervical back: Normal range of motion and neck supple. No rigidity or spasms. Normal range of motion. Lymphadenopathy:      Cervical: No cervical adenopathy. Skin:     Coloration: Skin is not jaundiced or pale. Findings: No bruising, erythema or rash. Neurological:      General: No focal deficit present. Mental Status: She is alert and oriented to person, place, and time. Cranial Nerves: No cranial nerve deficit. Sensory: No sensory deficit.       Motor: No weakness or tremor. Coordination: Coordination normal.      Gait: Gait and tandem walk normal.      Deep Tendon Reflexes: Reflexes are normal and symmetric. Psychiatric:         Attention and Perception: Attention and perception normal. She is attentive. Mood and Affect: Mood is anxious. Mood is not depressed. Affect is not tearful. Speech: She is communicative. Speech is not rapid and pressured, delayed or slurred. Behavior: Behavior normal. Behavior is not agitated or slowed. Thought Content: Thought content normal.         Judgment: Judgment normal.           I personally reviewed testing with patient. Otherwise labs within normal limits      Lab Results   Component Value Date    WBC 11.8 (H) 01/04/2023    HGB 14.4 01/04/2023    HCT 44.2 01/04/2023    MCV 79.3 (L) 01/04/2023     (H) 01/04/2023       Lab Results   Component Value Date/Time     08/27/2022 09:11 PM    K 3.8 08/27/2022 09:11 PM     08/27/2022 09:11 PM    CO2 26 08/27/2022 09:11 PM    BUN 11 08/27/2022 09:11 PM    CREATININE 0.66 08/27/2022 09:11 PM    GLUCOSE 128 08/27/2022 09:11 PM    CALCIUM 9.2 08/27/2022 09:11 PM        Lab Results   Component Value Date    ALT 45 (H) 03/22/2022    AST 36 (H) 03/22/2022    ALKPHOS 122 (H) 03/22/2022    BILITOT 0.40 03/22/2022       Lab Results   Component Value Date    TSH 0.61 07/07/2022       Lab Results   Component Value Date    LDLCHOLESTEROL 126 07/07/2022       Lab Results   Component Value Date    LABA1C 5.6 11/04/2022       Lab Results   Component Value Date    ZBVUMURV32 772 07/07/2022       Lab Results   Component Value Date    FOLATE 4.7 (L) 07/07/2022       Lab Results   Component Value Date    IRON 83 07/07/2022    TIBC 301 07/07/2022       Lab Results   Component Value Date    VITD25 23.9 (L) 07/07/2022         No orders of the defined types were placed in this encounter. There are no discontinued medications.       Orders Placed This Encounter   Procedures    PAP Smear     Patient History:    Patient's last menstrual period was 2016. OBGYN Status: Postmenopausal  Past Surgical History:  No date: BREAST BIOPSY  No date:  SECTION      Comment:  CS x 2  No date: COLONOSCOPY  2022: CT BONE MARROW BIOPSY      Comment:  CT BONE MARROW BIOPSY 2022 [facility] CT SCAN  No date: TUBAL LIGATION; Bilateral      Social History    Tobacco Use      Smoking status: Every Day        Packs/day: 1.00        Years: 28.00        Pack years: 28        Types: Cigarettes      Smokeless tobacco: Never      Tobacco comments: down to 3 cigs per day       Standing Status:   Future     Standing Expiration Date:   2024     Order Specific Question:   Collection Type     Answer: Thin Prep     Order Specific Question:   Prior Abnormal Pap Test     Answer:   No     Order Specific Question:   Screening or Diagnostic     Answer:   Screening     Order Specific Question:   HPV Requested? Answer:   Yes -  If ASCUS Reflex HPV     Order Specific Question:   High Risk Patient     Answer:   N/A           This note was completed by using the assistance of a speech-recognition program. However, inadvertent computerized transcription errors may be present. Although every effort was made to ensure accuracy, no guarantees can be provided that every mistake has been identified and corrected by editing. An electronic signature was used to authenticate this note.     Electronically signed by Cranford Riedel, MD on 2023 at 7:26 PM

## 2023-01-04 NOTE — PROGRESS NOTES
Pt arrived for therapeutic phlebotomy. Vitals obtained and labs drawn. Hct = 44.2, therefore procedure indicated per written parameters. 500 mL whole blood removed using R arm. Pt tolerated well. Monitored post procedure. No s/s adverse reactions noted. Vitals remained stable. Pt discharged home, ambulatory per self.

## 2023-01-04 NOTE — PATIENT INSTRUCTIONS
New Updates for [facility] MyChart/ [email or web address] ([facility]) [facility]    Thank you for choosing [facility] to give you the best care! [employer] ([facility]) is always trying to think of new ways to help their patients. We are asking all patients to try out the new digital registration that is now available through your [facility] account or the new [employer], [email or web address] ([facility]). Via the khloe you're now able to update your personal and registration information prior to your upcoming appointment. This will save you time once you arrive at the office to check-in, not to mention your information remains safe!! Many other perks come from signing up for an account, such as:  Requesting refills  Scheduling an appointment  Completing an E-Visit  Sending a message to the office/provider  Having access to your medication list  Paying your bill/copay prior to your appointment  Scheduling your yearly mammogram  Review your test results    If you are not familiar with [facility] or the [email or web address] ([facility]) KHLOE, please ask one of us and we will be happy to answer any questions or help you set-up your account.       Your [facility] office,  [place]

## 2023-01-04 NOTE — PROGRESS NOTES
Visit Information    Have you changed or started any medications since your last visit including any over-the-counter medicines, vitamins, or herbal medicines? no   Are you having any side effects from any of your medications? -  no  Have you stopped taking any of your medications? Is so, why? -  no    Have you seen any other physician or provider since your last visit? No  Have you had any other diagnostic tests since your last visit? No  Have you been seen in the emergency room and/or had an admission to a hospital since we last saw you? No  Have you had your routine dental cleaning in the past 6 months? no    Have you activated your [employer] account? If not, what are your barriers?  Yes     Patient Care Team:  Thomas Ruiz MD as PCP - General (Family Medicine)  Thomas Ruiz MD as PCP - [facility]  Tessy Ellsworth MD as Surgeon (Colon and Rectal Surgery)    Medical History Review  Past Medical, Family, and Social History reviewed and does contribute to the patient presenting condition    Health Maintenance   Topic Date Due    COVID-19 Vaccine (1) Never done    Shingles vaccine (1 of 2) Never done    Low dose CT lung screening  Never done    Colorectal Cancer Screen  08/30/2019    Cervical cancer screen  08/23/2021    Flu vaccine (1) Never done    Lipids  07/07/2023    Depression Monitoring  09/28/2023    A1C test (Diabetic or Prediabetic)  11/04/2023    Breast cancer screen  03/30/2024    DTaP/Tdap/Td vaccine (2 - Td or Tdap) 10/14/2027    Pneumococcal 0-64 years Vaccine  Completed    Hepatitis C screen  Completed    HIV screen  Completed    Hepatitis A vaccine  Aged Out    Hib vaccine  Aged Out    Meningococcal (ACWY) vaccine  Aged Out

## 2023-01-06 DIAGNOSIS — J41.0 SIMPLE CHRONIC BRONCHITIS (HCC): ICD-10-CM

## 2023-01-06 DIAGNOSIS — I47.20 VENTRICULAR TACHYARRHYTHMIA: ICD-10-CM

## 2023-01-06 RX ORDER — METOPROLOL SUCCINATE 25 MG/1
25 TABLET, EXTENDED RELEASE ORAL DAILY
Qty: 90 TABLET | Refills: 3 | Status: SHIPPED | OUTPATIENT
Start: 2023-01-06

## 2023-01-06 RX ORDER — TIOTROPIUM BROMIDE INHALATION SPRAY 1.56 UG/1
SPRAY, METERED RESPIRATORY (INHALATION)
Qty: 4 G | Refills: 5 | Status: SHIPPED | OUTPATIENT
Start: 2023-01-06

## 2023-01-17 ENCOUNTER — HOSPITAL ENCOUNTER (OUTPATIENT)
Age: 58
Discharge: HOME | End: 2023-01-17
Payer: MEDICARE

## 2023-01-17 ENCOUNTER — OFFICE VISIT (OUTPATIENT)
Dept: ONCOLOGY | Age: 58
End: 2023-01-17
Payer: MEDICARE

## 2023-01-17 ENCOUNTER — TELEPHONE (OUTPATIENT)
Dept: ONCOLOGY | Age: 58
End: 2023-01-17

## 2023-01-17 VITALS
WEIGHT: 151.2 LBS | SYSTOLIC BLOOD PRESSURE: 111 MMHG | BODY MASS INDEX: 25.16 KG/M2 | HEART RATE: 67 BPM | TEMPERATURE: 96.9 F | DIASTOLIC BLOOD PRESSURE: 55 MMHG

## 2023-01-17 DIAGNOSIS — R71.8 MICROCYTOSIS: ICD-10-CM

## 2023-01-17 DIAGNOSIS — Z15.89 JAK-2 GENE MUTATION: ICD-10-CM

## 2023-01-17 DIAGNOSIS — D47.1 MPN (MYELOPROLIFERATIVE NEOPLASM) (HCC): ICD-10-CM

## 2023-01-17 DIAGNOSIS — D47.3 ESSENTIAL THROMBOCYTHEMIA (HCC): ICD-10-CM

## 2023-01-17 DIAGNOSIS — D45 POLYCYTHEMIA VERA (HCC): ICD-10-CM

## 2023-01-17 DIAGNOSIS — D45 POLYCYTHEMIA VERA (HCC): Primary | ICD-10-CM

## 2023-01-17 LAB
FERRITIN: 11 NG/ML (ref 13–150)
HCT VFR BLD CALC: 42.9 % (ref 36–46)
HEMOGLOBIN: 13.7 G/DL (ref 12–16)
IRON SATURATION: 7 % (ref 20–55)
IRON: 28 UG/DL (ref 37–145)
MCH RBC QN AUTO: 24.9 PG (ref 26–34)
MCHC RBC AUTO-ENTMCNC: 32 G/DL (ref 31–37)
MCV RBC AUTO: 77.9 FL (ref 80–100)
PDW BLD-RTO: 15 % (ref 11.5–14.9)
PLATELET # BLD: 659 K/UL (ref 150–450)
PMV BLD AUTO: 8.1 FL (ref 6–12)
RBC # BLD: 5.51 M/UL (ref 4–5.2)
TOTAL IRON BINDING CAPACITY: 410 UG/DL (ref 250–450)
UNSATURATED IRON BINDING CAPACITY: 382 UG/DL (ref 112–347)
WBC # BLD: 11.9 K/UL (ref 3.5–11)

## 2023-01-17 PROCEDURE — 83550 IRON BINDING TEST: CPT

## 2023-01-17 PROCEDURE — 83540 ASSAY OF IRON: CPT

## 2023-01-17 PROCEDURE — 3017F COLORECTAL CA SCREEN DOC REV: CPT | Performed by: INTERNAL MEDICINE

## 2023-01-17 PROCEDURE — G8484 FLU IMMUNIZE NO ADMIN: HCPCS | Performed by: INTERNAL MEDICINE

## 2023-01-17 PROCEDURE — 4004F PT TOBACCO SCREEN RCVD TLK: CPT | Performed by: INTERNAL MEDICINE

## 2023-01-17 PROCEDURE — 36415 COLL VENOUS BLD VENIPUNCTURE: CPT

## 2023-01-17 PROCEDURE — 99214 OFFICE O/P EST MOD 30 MIN: CPT | Performed by: INTERNAL MEDICINE

## 2023-01-17 PROCEDURE — 82728 ASSAY OF FERRITIN: CPT

## 2023-01-17 PROCEDURE — G8419 CALC BMI OUT NRM PARAM NOF/U: HCPCS | Performed by: INTERNAL MEDICINE

## 2023-01-17 PROCEDURE — 3078F DIAST BP <80 MM HG: CPT | Performed by: INTERNAL MEDICINE

## 2023-01-17 PROCEDURE — 3074F SYST BP LT 130 MM HG: CPT | Performed by: INTERNAL MEDICINE

## 2023-01-17 PROCEDURE — 85027 COMPLETE CBC AUTOMATED: CPT

## 2023-01-17 PROCEDURE — 99211 OFF/OP EST MAY X REQ PHY/QHP: CPT

## 2023-01-17 PROCEDURE — G8427 DOCREV CUR MEDS BY ELIG CLIN: HCPCS | Performed by: INTERNAL MEDICINE

## 2023-01-17 NOTE — PROGRESS NOTES
Change phlebotomy change phlebotomy to every 4 weeks      Patient ID: Kathy Skelton, 1965, [id number], 56 y.o. Referred by :  No ref. provider found   Reason for consultation: JAK2 mutation positive      HISTORY OF PRESENT ILLNESS:    Oncologic History:    Kathy Skelton is a very pleasant 56 y.o. female was referred for high hemoglobin hematocrit and positive JAK2 mutation also had platelet elevated at 601 WBC fluctuating up and down, reviewing labs for the last 3 years she had high hematocrit since at least 2019 with rising platelet count and fluctuating neutrophilia, patient denied history of stroke or thrombosis or myocardial infarction,she had diabetes and she is on nitroglycerin, she does have history of V. tach and stress test was negative and on Eliquis for A. Fib  Bone marrow biopsy and aspirate was done and there was increased megakaryocyte with hyperlobulated    Interim history  Patient presents to the clinic for a follow-up visit and to discuss results of lab work-up and other relevant clinical data. Overall patient has been tolerating treatment without unexpected or severe side effects. During this visit patient's allergy, social, medical, surgical history and medications were reviewed and updated.      Patient on phlebotomy every 4 weeks and the most recent hematocrit 42 with platelet 897 developing microcytosis      Past Medical History:   Diagnosis Date    Chronic kidney disease     CKD (chronic kidney disease) stage 1, GFR 90 ml/min or greater     COPD (chronic obstructive pulmonary disease) (HCC)     Depression     Hyperlipidemia     Hypocalcemia     IBS (irritable bowel syndrome)     Polycythemia vera ([facility] [address] 2022    Primary hypertension 3/22/2022    Proteinuria        Past Surgical History:   Procedure Laterality Date    BREAST BIOPSY       SECTION      CS x 2    COLONOSCOPY      CT BONE MARROW BIOPSY  2022    CT BONE MARROW BIOPSY 2022 [facility] CT SCAN    TUBAL LIGATION Bilateral        No Known Allergies    Current Outpatient Medications   Medication Sig Dispense Refill    SPIRIVA RESPIMAT 1.25 MCG/ACT AERS inhaler INHALE 2 PUFFS BY MOUTH EVERY DAY 4 g 5    metoprolol succinate (TOPROL XL) 25 MG extended release tablet TAKE 1 TABLET BY MOUTH DAILY 90 tablet 3    albuterol sulfate HFA (VENTOLIN HFA) 108 (90 Base) MCG/ACT inhaler INHALE 2 PUFFS INTO THE LUNGS EVERY 6 HOURS AS NEEDED FOR WHEEZING 18 g 2    atorvastatin (LIPITOR) 40 MG tablet TAKE 1 TABLET BY MOUTH DAILY 30 tablet 0    aspirin (ASPIRIN LOW DOSE) 81 MG EC tablet TAKE 1 TABLET BY MOUTH DAILY 90 tablet 0    vitamin D (ERGOCALCIFEROL) 1.25 MG (70267 UT) CAPS capsule TAKE 1 CAPSULE BY MOUTH 1 TIME A WEEK 12 capsule 1    famotidine (PEPCID) 20 MG tablet TAKE 1 TABLET BY MOUTH TWICE DAILY 60 tablet 3    metFORMIN (GLUCOPHAGE-XR) 500 MG extended release tablet TAKE 1 TABLET BY MOUTH DAILY WITH BREAKFAST 30 tablet 3    ELIQUIS 5 MG TABS tablet TAKE 1 TABLET BY MOUTH TWICE DAILY 60 tablet 3    polyethylene glycol (MIRALAX) 17 GM/SCOOP powder Take 17 g by mouth daily as needed (constipation) 238 g 3    flecainide (TAMBOCOR) 50 MG tablet       nitroGLYCERIN (NITROSTAT) 0.4 MG SL tablet Place 1 tablet under the tongue every 5 minutes as needed for Chest pain up to max of 3 total doses. If no relief after 1 dose, call 911. 25 tablet 3    Elastic Bandages & Supports (KNEE BRACE/HINGED/LARGE) MISC Use daily for knee pain 1 each 0    Blood Pressure KIT Dx: HTN. Needs automatic blood pressure machine to monitor her blood pressure. (Patient not taking: No sig reported) 1 kit 0    nicotine (NICODERM CQ) 14 MG/24HR Place 1 patch onto the skin daily 42 patch 2    Blood Pressure Monitor KIT TAKE BLOOD PRESSURE DAILY (Patient not taking: Reported on 1/17/2023) 1 kit 0     No current facility-administered medications for this visit.        Social History     Socioeconomic History    Marital status: Single     Spouse name: Not on file Number of children: Not on file    Years of education: Not on file    Highest education level: Not on file   Occupational History    Not on file   Tobacco Use    Smoking status: Every Day     Packs/day: 1.00     Years: 28.00     Pack years: 28.00     Types: Cigarettes    Smokeless tobacco: Never    Tobacco comments:     down to 3 cigs per day   Vaping Use    Vaping Use: Never used   Substance and Sexual Activity    Alcohol use: No     Alcohol/week: 0.0 standard drinks    Drug use: No    Sexual activity: Not on file   Other Topics Concern    Not on file   Social History Narrative    Not on file     Social Determinants of Health     Financial Resource Strain: High Risk    Difficulty of Paying Living Expenses: Hard   Food Insecurity: Food Insecurity Present    Worried About Running Out of Food in the Last Year: Often true    Ran Out of Food in the Last Year: Often true   Transportation Needs: Not on file   Physical Activity: Not on file   Stress: Not on file   Social Connections: Not on file   Intimate Partner Violence: Not on file   Housing Stability: Not on file       Family History   Problem Relation Age of Onset    Asthma Father     Cancer Father         colon    Diabetes Father     COPD Father     Heart Failure Father     Seizures Mother         REVIEW OF SYSTEM:     Constitutional: No fever or chills. No night sweats, no weight loss   Eyes: No eye discharge, double vision, or eye pain   HEENT: negative for sore mouth, sore throat, hoarseness and voice change   Respiratory: negative for cough , sputum, dyspnea, wheezing, hemoptysis, chest pain   Cardiovascular: negative for chest pain, dyspnea, palpitations, orthopnea, PND   Gastrointestinal: negative for nausea, vomiting, diarrhea, constipation, abdominal pain, Dysphagia, hematemesis and hematochezia   Genitourinary: negative for frequency, dysuria, nocturia, urinary incontinence, and hematuria   Integument: negative for rash, skin lesions, bruises. Hematologic/Lymphatic: negative for easy bruising, bleeding, lymphadenopathy, petechiae and swelling/edema   Endocrine: negative for heat or cold intolerance, tremor, weight changes, change in bowel habits and hair loss   Musculoskeletal: negative for myalgias, arthralgias, pain, joint swelling,and bone pain   Neurological: negative for headaches, dizziness, seizures, weakness, numbness       OBJECTIVE:         Vitals:    01/17/23 1247   BP: (!) 111/55   Pulse: 67   Temp: 96.9 °F (36.1 °C)         PHYSICAL EXAM:   General appearance - well appearing, no in pain or distress   Mental status - alert and cooperative   Eyes - pupils equal and reactive, extraocular eye movements intact   Ears - bilateral TM's and external ear canals normal   Mouth - mucous membranes moist, pharynx normal without lesions   Neck - supple, no significant adenopathy   Lymphatics - no palpable lymphadenopathy, no hepatosplenomegaly   Chest -bilateral wheezing  Heart - normal rate, regular rhythm, normal S1, S2, no murmurs, rubs, clicks or gallops   Abdomen - soft, nontender, nondistended, no masses or organomegaly   Neurological - alert, oriented, normal speech, no focal findings or movement disorder noted   Musculoskeletal - no joint tenderness, deformity or swelling   Extremities - peripheral pulses normal, no pedal edema, no clubbing or cyanosis   Skin - normal coloration and turgor, no rashes, no suspicious skin lesions noted ,      LABORATORY DATA:     Lab Results   Component Value Date    WBC 11.9 (H) 01/17/2023    HGB 13.7 01/17/2023    HCT 42.9 01/17/2023    MCV 77.9 (L) 01/17/2023     (H) 01/17/2023    LYMPHOPCT 37 01/04/2023    RBC 5.51 (H) 01/17/2023    MCH 24.9 (L) 01/17/2023    MCHC 32.0 01/17/2023    RDW 15.0 (H) 01/17/2023    MONOPCT 1 01/04/2023    BASOPCT 0 01/04/2023    NEUTROABS 7.07 01/04/2023    LYMPHSABS 4.37 01/04/2023    MONOSABS 0.12 01/04/2023    EOSABS 0.24 01/04/2023    BASOSABS 0.00 01/04/2023 Chemistry        Component Value Date/Time     08/27/2022 2111    K 3.8 08/27/2022 2111     08/27/2022 2111    CO2 26 08/27/2022 2111    BUN 11 08/27/2022 2111    CREATININE 0.66 08/27/2022 2111        Component Value Date/Time    CALCIUM 9.2 08/27/2022 2111    ALKPHOS 122 (H) 03/22/2022 1256    AST 36 (H) 03/22/2022 1256    ALT 45 (H) 03/22/2022 1256    BILITOT 0.40 03/22/2022 1256            PATHOLOGY DATA:         IMAGING DATA:      XR KNEE LEFT (MIN 4 VIEWS)  Narrative: EXAMINATION:  FOUR XRAY VIEWS OF THE LEFT KNEE    10/23/2022 10:49 pm    COMPARISON:  None. HISTORY:  ORDERING SYSTEM PROVIDED HISTORY: fall directly on the patella  TECHNOLOGIST PROVIDED HISTORY:  fall directly on the patella  Reason for Exam: PT CO pain in left knee after falling and landing on  anterior knee today. PT states hx chronic pain in this knee before injury. FINDINGS:  Osseous structures of the knee are intact and align normally. No retained  radiopaque foreign body. Joint spaces appear well maintained. Impression: No acute osseous abnormality evident. Follow-up or additional imaging should be considered if pain persists or  worsens. ASSESSMENT:       Diagnosis Orders   1. Polycythemia vera (HCC)  Iron and TIBC    Ferritin    CBC with Manual Differential      2. Essential thrombocythemia ([address]  CBC with Manual Differential      3. KIKA-2 gene mutation  CBC with Manual Differential      4. MPN (myeloproliferative neoplasm) (HCC)  CBC with Manual Differential      5. Microcytosis  Iron and TIBC    Ferritin    CBC with Manual Differential             MPN with positive JAK2 mutation(PV/ET)  History of A. fib on Eliquis  Diabetes and hypertension  Active smoking  Developing microcytosis    PLAN:     I reviewed the labs available to me,outside records and discussed with the patient. I explained to the patient the nature of this hematologic problem.  I explained the significance of these abnormalities and possible etiology and management options   Patient had JAK2 mutation positive MPN compatible with polycythemia vera with high platelet;it is difficult to differentiate if this is purely PV versus ET however under category as JAK2 mutation MPN and the treatment in this case would be phlebotomy to keep hematocrit below 42,this is considered low risk PV/ET with her age below 61 and absent of thrombotic event, recommend continuing aspirin low-dose 81 mg, control risk factors of CAD, no indication for Hydrea(for essential thrombocythemia)  review the bone marrow biopsy with the pathologist and its difficult to tell if its polycythemia vera versus essential thrombocythemia however it came under category JAK2 positive MPN  Continue phlebotomy to keep hematocrit below 42> every 4 weeks as needed for hematocrit above 42  Explained to the patient that Eliquis and aspirin increased risk of bleed will monitor her closely  Smoking cessation education  The worsening thrombocytosis likely related to progressive iron deficiency anemia> iron panel  RTC in 6 weeks      Thank you for the consult                                        [facility]                            This note is created with the assistance of a speech recognition program.  While intending to generate a document that actually reflects the content of the visit, the document can still have some errors including those of syntax and sound a like substitutions which may escape proof reading. It such instances, actual meaning can be extrapolated by contextual diversion.

## 2023-01-17 NOTE — TELEPHONE ENCOUNTER
AVS FROM 1/17/23      Iron panel can be add on blood this am  Rtc in 6 weeks with labs      Pt had to go back to labs and get iron panel done     RV on 2/28/23    Labs to be drawn week prior (Ferritin, Iron and TIBC, cbc)    PT was given AVS and appt schedule      Electronically signed by Olayinka Beard on 1/17/2023 at 1:07 PM

## 2023-01-31 DIAGNOSIS — E78.2 MIXED HYPERLIPIDEMIA: ICD-10-CM

## 2023-01-31 RX ORDER — ATORVASTATIN CALCIUM 40 MG/1
40 TABLET, FILM COATED ORAL DAILY
Qty: 90 TABLET | Refills: 1 | Status: SHIPPED | OUTPATIENT
Start: 2023-01-31

## 2023-01-31 NOTE — TELEPHONE ENCOUNTER
Please Approve or Refuse.   Send to Pharmacy per Pt's Request:      Next Visit Date:  4/5/2023   Last Visit Date: 1/4/2023    Hemoglobin A1C (%)   Date Value   11/04/2022 5.6   07/07/2022 6.2 (H)   08/01/2019 5.6             ( goal A1C is < 7)   BP Readings from Last 3 Encounters:   01/17/23 (!) 111/55   01/04/23 107/68   01/04/23 110/60          (goal 120/80)  BUN   Date Value Ref Range Status   08/27/2022 11 6 - 20 mg/dL Final     Creatinine   Date Value Ref Range Status   08/27/2022 0.66 0.50 - 0.90 mg/dL Final     Potassium   Date Value Ref Range Status   08/27/2022 3.8 3.7 - 5.3 mmol/L Final

## 2023-02-03 ENCOUNTER — HOSPITAL ENCOUNTER (OUTPATIENT)
Dept: INFUSION THERAPY | Age: 58
Setting detail: INFUSION SERIES
Discharge: HOME | End: 2023-02-03
Payer: MEDICARE

## 2023-02-03 VITALS
TEMPERATURE: 96 F | SYSTOLIC BLOOD PRESSURE: 106 MMHG | RESPIRATION RATE: 17 BRPM | DIASTOLIC BLOOD PRESSURE: 73 MMHG | HEART RATE: 68 BPM | OXYGEN SATURATION: 95 %

## 2023-02-03 DIAGNOSIS — D45 POLYCYTHEMIA VERA (HCC): Primary | ICD-10-CM

## 2023-02-03 LAB
HCT VFR BLD AUTO: 45.1 % (ref 36–46)
HGB BLD-MCNC: 14.4 G/DL (ref 12–16)

## 2023-02-03 PROCEDURE — 85018 HEMOGLOBIN: CPT

## 2023-02-03 PROCEDURE — 85014 HEMATOCRIT: CPT

## 2023-02-03 PROCEDURE — 36415 COLL VENOUS BLD VENIPUNCTURE: CPT

## 2023-02-03 PROCEDURE — 99195 PHLEBOTOMY: CPT

## 2023-02-03 NOTE — PROGRESS NOTES
Pt arrived for therapeutic phlebotomy. Vitals obtained and labs drawn. Hct = 45.1, therefore procedure indicated per written parameters. 500 mL whole blood removed using L arm. Pt tolerated well. Monitored post procedure. No s/s adverse reactions noted. Vitals remained stable. Pt discharged home, ambulatory per self.

## 2023-02-28 ENCOUNTER — HOSPITAL ENCOUNTER (OUTPATIENT)
Age: 58
Discharge: HOME | End: 2023-02-28
Payer: MEDICAID

## 2023-02-28 DIAGNOSIS — D47.3 ESSENTIAL THROMBOCYTHEMIA (HCC): ICD-10-CM

## 2023-02-28 DIAGNOSIS — D45 POLYCYTHEMIA VERA (HCC): ICD-10-CM

## 2023-02-28 DIAGNOSIS — D47.1 MPN (MYELOPROLIFERATIVE NEOPLASM) (HCC): ICD-10-CM

## 2023-02-28 DIAGNOSIS — Z15.89 JAK-2 GENE MUTATION: ICD-10-CM

## 2023-02-28 DIAGNOSIS — R71.8 MICROCYTOSIS: ICD-10-CM

## 2023-02-28 LAB
ABSOLUTE EOS #: 0.4 K/UL (ref 0–0.4)
ABSOLUTE LYMPH #: 4.09 K/UL (ref 1–4.8)
ABSOLUTE MONO #: 0.92 K/UL (ref 0.1–1.3)
BASOPHILS # BLD: 1 % (ref 0–2)
BASOPHILS ABSOLUTE: 0.13 K/UL (ref 0–0.2)
EOSINOPHILS RELATIVE PERCENT: 3 % (ref 0–4)
HCT VFR BLD AUTO: 40.2 % (ref 36–46)
HGB BLD-MCNC: 12.9 G/DL (ref 12–16)
LYMPHOCYTES # BLD: 31 % (ref 24–44)
MCH RBC QN AUTO: 22.8 PG (ref 26–34)
MCHC RBC AUTO-ENTMCNC: 32 G/DL (ref 31–37)
MCV RBC AUTO: 71.3 FL (ref 80–100)
MONOCYTES # BLD: 7 % (ref 1–7)
MORPHOLOGY: ABNORMAL
PDW BLD-RTO: 15.9 % (ref 11.5–14.9)
PLATELET # BLD AUTO: 608 K/UL (ref 150–450)
PMV BLD AUTO: 8.2 FL (ref 6–12)
RBC # BLD: 5.64 M/UL (ref 4–5.2)
SEG NEUTROPHILS: 58 % (ref 36–66)
SEGMENTED NEUTROPHILS ABSOLUTE COUNT: 7.66 K/UL (ref 1.3–9.1)
WBC # BLD AUTO: 13.2 K/UL (ref 3.5–11)

## 2023-02-28 PROCEDURE — 36415 COLL VENOUS BLD VENIPUNCTURE: CPT

## 2023-02-28 PROCEDURE — 85025 COMPLETE CBC W/AUTO DIFF WBC: CPT

## 2023-03-06 DIAGNOSIS — R73.03 PREDIABETES: ICD-10-CM

## 2023-03-07 RX ORDER — METFORMIN HYDROCHLORIDE 500 MG/1
TABLET, EXTENDED RELEASE ORAL
Qty: 90 TABLET | Refills: 3 | Status: SHIPPED | OUTPATIENT
Start: 2023-03-07

## 2023-03-22 DIAGNOSIS — E78.2 MIXED HYPERLIPIDEMIA: ICD-10-CM

## 2023-03-22 RX ORDER — ASPIRIN 81 MG/1
TABLET ORAL
Qty: 90 TABLET | Refills: 3 | Status: SHIPPED | OUTPATIENT
Start: 2023-03-22

## 2023-03-22 NOTE — TELEPHONE ENCOUNTER
Please Approve or Refuse.   Send to Pharmacy per Pt's Request:      Next Visit Date:  4/5/2023   Last Visit Date: 1/4/2023    Hemoglobin A1C (%)   Date Value   11/04/2022 5.6   07/07/2022 6.2 (H)   08/01/2019 5.6             ( goal A1C is < 7)   BP Readings from Last 3 Encounters:   02/03/23 106/73   01/17/23 (!) 111/55   01/04/23 107/68          (goal 120/80)  BUN   Date Value Ref Range Status   08/27/2022 11 6 - 20 mg/dL Final     Creatinine   Date Value Ref Range Status   08/27/2022 0.66 0.50 - 0.90 mg/dL Final     Potassium   Date Value Ref Range Status   08/27/2022 3.8 3.7 - 5.3 mmol/L Final

## 2023-03-28 ENCOUNTER — HOSPITAL ENCOUNTER (OUTPATIENT)
Age: 58
Discharge: HOME | End: 2023-03-28
Payer: MEDICAID

## 2023-03-28 ENCOUNTER — OFFICE VISIT (OUTPATIENT)
Dept: ONCOLOGY | Age: 58
End: 2023-03-28
Payer: MEDICAID

## 2023-03-28 ENCOUNTER — TELEPHONE (OUTPATIENT)
Dept: ONCOLOGY | Age: 58
End: 2023-03-28

## 2023-03-28 VITALS
DIASTOLIC BLOOD PRESSURE: 72 MMHG | HEART RATE: 70 BPM | WEIGHT: 151 LBS | SYSTOLIC BLOOD PRESSURE: 110 MMHG | TEMPERATURE: 97.4 F | BODY MASS INDEX: 25.13 KG/M2

## 2023-03-28 DIAGNOSIS — D45 POLYCYTHEMIA VERA (HCC): Primary | ICD-10-CM

## 2023-03-28 DIAGNOSIS — D47.3 ESSENTIAL THROMBOCYTHEMIA (HCC): ICD-10-CM

## 2023-03-28 DIAGNOSIS — D50.0 IRON DEFICIENCY ANEMIA DUE TO CHRONIC BLOOD LOSS: ICD-10-CM

## 2023-03-28 DIAGNOSIS — Z15.89 JAK-2 GENE MUTATION: ICD-10-CM

## 2023-03-28 DIAGNOSIS — D47.1 MPN (MYELOPROLIFERATIVE NEOPLASM) (HCC): ICD-10-CM

## 2023-03-28 DIAGNOSIS — D45 POLYCYTHEMIA VERA (HCC): ICD-10-CM

## 2023-03-28 LAB
FERRITIN SERPL-MCNC: 10 NG/ML (ref 13–150)
HCT VFR BLD AUTO: 41.6 % (ref 36–46)
HGB BLD-MCNC: 13.2 G/DL (ref 12–16)
IRON SATURATION: 6 % (ref 20–55)
IRON SERPL-MCNC: 25 UG/DL (ref 37–145)
MCH RBC QN AUTO: 21.5 PG (ref 26–34)
MCHC RBC AUTO-ENTMCNC: 31.7 G/DL (ref 31–37)
MCV RBC AUTO: 68 FL (ref 80–100)
PDW BLD-RTO: 16.4 % (ref 11.5–14.9)
PLATELET # BLD AUTO: 614 K/UL (ref 150–450)
PMV BLD AUTO: 7.9 FL (ref 6–12)
RBC # BLD: 6.13 M/UL (ref 4–5.2)
TIBC SERPL-MCNC: 406 UG/DL (ref 250–450)
UNSATURATED IRON BINDING CAPACITY: 381 UG/DL (ref 112–347)
WBC # BLD AUTO: 12.4 K/UL (ref 3.5–11)

## 2023-03-28 PROCEDURE — 83540 ASSAY OF IRON: CPT

## 2023-03-28 PROCEDURE — 83550 IRON BINDING TEST: CPT

## 2023-03-28 PROCEDURE — 3078F DIAST BP <80 MM HG: CPT | Performed by: INTERNAL MEDICINE

## 2023-03-28 PROCEDURE — 36415 COLL VENOUS BLD VENIPUNCTURE: CPT

## 2023-03-28 PROCEDURE — 99211 OFF/OP EST MAY X REQ PHY/QHP: CPT

## 2023-03-28 PROCEDURE — 3074F SYST BP LT 130 MM HG: CPT | Performed by: INTERNAL MEDICINE

## 2023-03-28 PROCEDURE — 82728 ASSAY OF FERRITIN: CPT

## 2023-03-28 PROCEDURE — 99214 OFFICE O/P EST MOD 30 MIN: CPT | Performed by: INTERNAL MEDICINE

## 2023-03-28 PROCEDURE — 85027 COMPLETE CBC AUTOMATED: CPT

## 2023-03-28 NOTE — PROGRESS NOTES
2111    BUN 11 08/27/2022 2111    CREATININE 0.66 08/27/2022 2111        Component Value Date/Time    CALCIUM 9.2 08/27/2022 2111    ALKPHOS 122 (H) 03/22/2022 1256    AST 36 (H) 03/22/2022 1256    ALT 45 (H) 03/22/2022 1256    BILITOT 0.40 03/22/2022 1256            PATHOLOGY DATA:         IMAGING DATA:      XR KNEE LEFT (MIN 4 VIEWS)  Narrative: EXAMINATION:  FOUR XRAY VIEWS OF THE LEFT KNEE    10/23/2022 10:49 pm    COMPARISON:  None. HISTORY:  ORDERING SYSTEM PROVIDED HISTORY: fall directly on the patella  TECHNOLOGIST PROVIDED HISTORY:  fall directly on the patella  Reason for Exam: PT CO pain in left knee after falling and landing on  anterior knee today. PT states hx chronic pain in this knee before injury. FINDINGS:  Osseous structures of the knee are intact and align normally. No retained  radiopaque foreign body. Joint spaces appear well maintained. Impression: No acute osseous abnormality evident. Follow-up or additional imaging should be considered if pain persists or  worsens. ASSESSMENT:       Diagnosis Orders   1. Polycythemia vera (HCC)  CBC with Auto Differential    Comprehensive Metabolic Panel    Iron and TIBC    Ferritin    Vitamin B12 & Folate      2. Essential thrombocythemia (HCC)  CBC with Auto Differential    Comprehensive Metabolic Panel    Iron and TIBC    Ferritin    Vitamin B12 & Folate      3. KIKA-2 gene mutation        4. MPN (myeloproliferative neoplasm) ([facility] [address]        5. Iron deficiency anemia due to chronic blood loss                 MPN with positive JAK2 mutation(PV/ET)  History of A. fib on Eliquis  Diabetes and hypertension  Active smoking  Developing iron deficiency anemia    PLAN:     I reviewed the labs available to me,outside records and discussed with the patient. I explained to the patient the nature of this hematologic problem.  I explained the significance of these abnormalities and possible etiology and management options   Patient had JAK2 mutation

## 2023-03-29 ENCOUNTER — HOSPITAL ENCOUNTER (EMERGENCY)
Age: 58
Discharge: HOME | End: 2023-03-29
Attending: EMERGENCY MEDICINE
Payer: MEDICAID

## 2023-03-29 ENCOUNTER — APPOINTMENT (OUTPATIENT)
Dept: CT IMAGING | Age: 58
End: 2023-03-29
Payer: MEDICAID

## 2023-03-29 VITALS
RESPIRATION RATE: 15 BRPM | SYSTOLIC BLOOD PRESSURE: 129 MMHG | TEMPERATURE: 98.1 F | BODY MASS INDEX: 24.66 KG/M2 | DIASTOLIC BLOOD PRESSURE: 59 MMHG | HEIGHT: 65 IN | WEIGHT: 148 LBS | HEART RATE: 72 BPM | OXYGEN SATURATION: 97 %

## 2023-03-29 DIAGNOSIS — N10 ACUTE PYELONEPHRITIS: Primary | ICD-10-CM

## 2023-03-29 LAB
ABSOLUTE EOS #: 0.38 K/UL (ref 0–0.4)
ABSOLUTE LYMPH #: 2.5 K/UL (ref 1–4.8)
ABSOLUTE MONO #: 1.34 K/UL (ref 0.1–1.3)
ALBUMIN SERPL-MCNC: 4.2 G/DL (ref 3.5–5.2)
ALP SERPL-CCNC: 101 U/L (ref 35–104)
ALT SERPL-CCNC: 15 U/L (ref 5–33)
ANION GAP SERPL CALCULATED.3IONS-SCNC: 11 MMOL/L (ref 9–17)
AST SERPL-CCNC: 23 U/L
BACTERIA: ABNORMAL
BASOPHILS # BLD: 1 % (ref 0–2)
BASOPHILS ABSOLUTE: 0.19 K/UL (ref 0–0.2)
BILIRUB SERPL-MCNC: 0.4 MG/DL (ref 0.3–1.2)
BILIRUBIN URINE: NEGATIVE
BUN SERPL-MCNC: 8 MG/DL (ref 6–20)
CALCIUM SERPL-MCNC: 9.4 MG/DL (ref 8.6–10.4)
CASTS UA: ABNORMAL /LPF
CHLORIDE SERPL-SCNC: 105 MMOL/L (ref 98–107)
CO2 SERPL-SCNC: 27 MMOL/L (ref 20–31)
COLOR: ABNORMAL
CREAT SERPL-MCNC: 0.58 MG/DL (ref 0.5–0.9)
EOSINOPHILS RELATIVE PERCENT: 2 % (ref 0–4)
EPITHELIAL CELLS UA: ABNORMAL /HPF
GFR SERPL CREATININE-BSD FRML MDRD: >60 ML/MIN/1.73M2
GLUCOSE SERPL-MCNC: 92 MG/DL (ref 70–99)
GLUCOSE UR STRIP.AUTO-MCNC: NEGATIVE MG/DL
HCT VFR BLD AUTO: 41.6 % (ref 36–46)
HGB BLD-MCNC: 13.3 G/DL (ref 12–16)
KETONES UR STRIP.AUTO-MCNC: NEGATIVE MG/DL
LEUKOCYTE ESTERASE UR QL STRIP.AUTO: ABNORMAL
LIPASE SERPL-CCNC: 20 U/L (ref 13–60)
LYMPHOCYTES # BLD: 13 % (ref 24–44)
MCH RBC QN AUTO: 21.6 PG (ref 26–34)
MCHC RBC AUTO-ENTMCNC: 31.9 G/DL (ref 31–37)
MCV RBC AUTO: 67.6 FL (ref 80–100)
MONOCYTES # BLD: 7 % (ref 1–7)
MORPHOLOGY: ABNORMAL
NITRITE UR QL STRIP.AUTO: NEGATIVE
PDW BLD-RTO: 16.3 % (ref 11.5–14.9)
PLATELET # BLD AUTO: 587 K/UL (ref 150–450)
PMV BLD AUTO: 8 FL (ref 6–12)
POTASSIUM SERPL-SCNC: 3.9 MMOL/L (ref 3.7–5.3)
PROT SERPL-MCNC: 7.2 G/DL (ref 6.4–8.3)
PROT UR STRIP.AUTO-MCNC: 6 MG/DL (ref 5–8)
PROT UR STRIP.AUTO-MCNC: ABNORMAL MG/DL
RBC # BLD: 6.15 M/UL (ref 4–5.2)
RBC CLUMPS #/AREA URNS AUTO: ABNORMAL /HPF
SEG NEUTROPHILS: 77 % (ref 36–66)
SEGMENTED NEUTROPHILS ABSOLUTE COUNT: 14.79 K/UL (ref 1.3–9.1)
SODIUM SERPL-SCNC: 143 MMOL/L (ref 135–144)
SPECIFIC GRAVITY UA: 1.01 (ref 1–1.03)
TURBIDITY: ABNORMAL
URINE HGB: ABNORMAL
UROBILINOGEN, URINE: NORMAL
WBC # BLD AUTO: 19.2 K/UL (ref 3.5–11)
WBC UA: ABNORMAL /HPF

## 2023-03-29 PROCEDURE — 6370000000 HC RX 637 (ALT 250 FOR IP): Performed by: EMERGENCY MEDICINE

## 2023-03-29 PROCEDURE — 99284 EMERGENCY DEPT VISIT MOD MDM: CPT

## 2023-03-29 PROCEDURE — 85025 COMPLETE CBC W/AUTO DIFF WBC: CPT

## 2023-03-29 PROCEDURE — 96365 THER/PROPH/DIAG IV INF INIT: CPT

## 2023-03-29 PROCEDURE — 6360000002 HC RX W HCPCS: Performed by: EMERGENCY MEDICINE

## 2023-03-29 PROCEDURE — 80053 COMPREHEN METABOLIC PANEL: CPT

## 2023-03-29 PROCEDURE — 74176 CT ABD & PELVIS W/O CONTRAST: CPT

## 2023-03-29 PROCEDURE — 87086 URINE CULTURE/COLONY COUNT: CPT

## 2023-03-29 PROCEDURE — 81001 URINALYSIS AUTO W/SCOPE: CPT

## 2023-03-29 PROCEDURE — 2580000003 HC RX 258: Performed by: EMERGENCY MEDICINE

## 2023-03-29 PROCEDURE — 83690 ASSAY OF LIPASE: CPT

## 2023-03-29 PROCEDURE — 36415 COLL VENOUS BLD VENIPUNCTURE: CPT

## 2023-03-29 RX ORDER — 0.9 % SODIUM CHLORIDE 0.9 %
1000 INTRAVENOUS SOLUTION INTRAVENOUS ONCE
Status: COMPLETED | OUTPATIENT
Start: 2023-03-29 | End: 2023-03-29

## 2023-03-29 RX ORDER — PHENAZOPYRIDINE HYDROCHLORIDE 200 MG/1
200 TABLET, FILM COATED ORAL 3 TIMES DAILY PRN
Qty: 6 TABLET | Refills: 0 | Status: SHIPPED | OUTPATIENT
Start: 2023-03-29 | End: 2023-04-01

## 2023-03-29 RX ORDER — CEPHALEXIN 500 MG/1
500 CAPSULE ORAL 3 TIMES DAILY
Qty: 30 CAPSULE | Refills: 0 | Status: SHIPPED | OUTPATIENT
Start: 2023-03-29 | End: 2023-04-08

## 2023-03-29 RX ORDER — PHENAZOPYRIDINE HYDROCHLORIDE 200 MG/1
200 TABLET, FILM COATED ORAL ONCE
Status: COMPLETED | OUTPATIENT
Start: 2023-03-29 | End: 2023-03-29

## 2023-03-29 RX ADMIN — CEFTRIAXONE SODIUM 1000 MG: 1 INJECTION, POWDER, FOR SOLUTION INTRAMUSCULAR; INTRAVENOUS at 21:12

## 2023-03-29 RX ADMIN — PHENAZOPYRIDINE 200 MG: 200 TABLET ORAL at 20:36

## 2023-03-29 RX ADMIN — SODIUM CHLORIDE 1000 ML: 9 INJECTION, SOLUTION INTRAVENOUS at 20:37

## 2023-03-29 ASSESSMENT — ENCOUNTER SYMPTOMS
EYE PAIN: 0
ABDOMINAL PAIN: 1
VOMITING: 0
NAUSEA: 0
CHEST TIGHTNESS: 0
FACIAL SWELLING: 0
SINUS PRESSURE: 0
DIARRHEA: 0
CONSTIPATION: 0
WHEEZING: 0
SHORTNESS OF BREATH: 0
BACK PAIN: 1
COUGH: 0
TROUBLE SWALLOWING: 0
BLOOD IN STOOL: 0
COLOR CHANGE: 0
EYE REDNESS: 0
EYE DISCHARGE: 0
SORE THROAT: 0
RHINORRHEA: 0

## 2023-03-29 ASSESSMENT — LIFESTYLE VARIABLES
HOW OFTEN DO YOU HAVE A DRINK CONTAINING ALCOHOL: NEVER
HOW MANY STANDARD DRINKS CONTAINING ALCOHOL DO YOU HAVE ON A TYPICAL DAY: PATIENT DOES NOT DRINK

## 2023-03-29 ASSESSMENT — PAIN - FUNCTIONAL ASSESSMENT: PAIN_FUNCTIONAL_ASSESSMENT: NONE - DENIES PAIN

## 2023-03-30 LAB
MICROORGANISM SPEC CULT: NORMAL
SPECIMEN DESCRIPTION: NORMAL

## 2023-03-30 NOTE — ED PROVIDER NOTES
needed for Chest pain up to max of 3 total doses. If no relief after 1 dose, call 911. POLYETHYLENE GLYCOL (MIRALAX) 17 GM/SCOOP POWDER    Take 17 g by mouth daily as needed (constipation)    SPIRIVA RESPIMAT 1.25 MCG/ACT AERS INHALER    INHALE 2 PUFFS BY MOUTH EVERY DAY    VITAMIN D (ERGOCALCIFEROL) 1.25 MG (44727 UT) CAPS CAPSULE    TAKE 1 CAPSULE BY MOUTH 1 TIME A WEEK       ALLERGIES     has No Known Allergies. SOCIAL HISTORY      reports that she has been smoking cigarettes. She has a 28.00 pack-year smoking history. She has never used smokeless tobacco. She reports that she does not drink alcohol and does not use drugs. PHYSICAL EXAM     INITIAL VITALS: BP (!) 129/59   Pulse 72   Temp 98.1 °F (36.7 °C)   Resp 15   Ht 5' 5\" (1.651 m)   Wt 148 lb (67.1 kg)   LMP 04/16/2016   SpO2 97%   BMI 24.63 kg/m²      Physical Exam  Vitals and nursing note reviewed. Constitutional:       General: She is not in acute distress. Appearance: She is well-developed. She is not diaphoretic. HENT:      Head: Normocephalic and atraumatic. Eyes:      General: No scleral icterus. Right eye: No discharge. Left eye: No discharge. Conjunctiva/sclera: Conjunctivae normal.      Pupils: Pupils are equal, round, and reactive to light. Cardiovascular:      Rate and Rhythm: Normal rate and regular rhythm. Heart sounds: Normal heart sounds. No murmur heard. No friction rub. No gallop. Pulmonary:      Effort: Pulmonary effort is normal. No respiratory distress. Breath sounds: Normal breath sounds. No wheezing or rales. Chest:      Chest wall: No tenderness. Abdominal:      General: Bowel sounds are normal. There is no distension. Palpations: Abdomen is soft. There is no mass. Tenderness: There is abdominal tenderness in the suprapubic area. There is no guarding or rebound. Musculoskeletal:         General: Normal range of motion. Lumbar back: Tenderness present. No swelling, edema, deformity, signs of trauma, lacerations, spasms or bony tenderness. Normal range of motion. No scoliosis. Skin:     General: Skin is warm and dry. Coloration: Skin is not pale. Findings: No erythema or rash. Neurological:      Mental Status: She is alert and oriented to person, place, and time. Cranial Nerves: No cranial nerve deficit. Sensory: No sensory deficit. Motor: No abnormal muscle tone. Coordination: Coordination normal.      Deep Tendon Reflexes: Reflexes normal.   Psychiatric:         Behavior: Behavior normal.         Thought Content: Thought content normal.         Judgment: Judgment normal.         MEDICAL DECISION MAKING:     Summary of Patient Presentation:        1)  Number and Complexity of Problems  Problem List This Visit:  Back pain, dysuria, hematuria    Differential Diagnosis:  UTI, kidney stone    Diagnoses Considered but Do Not Suspect:  AAA    Pertinent Comorbid Conditions:  IBS and chronic kidney disease    2)  Data Reviewed  Decision Rules/Scores/MIPS utilized:  None    External Documents Reviewed:  None    Imaging that is independently reviewed and interpreted by me as:  None    See more data below for the lab and radiology tests and orders. 3)  Treatment and Disposition      \"ED Course\" Notes From Epic Narrator:  ED Course as of 03/29/23 2200   Wed Mar 29, 2023   2104 Patient was updated on the urine findings and plan for CT scan and IV antibiotic. Patient is okay with this plan. [JL]   2158 Patient was updated on the results and we will discharge home with oral antibiotics. [JL]      ED Course User Index  [JL] Jason Gonzales MD         PROCEDURES:  None      DATA FOR LAB AND RADIOLOGY TESTS ORDERED BELOW ARE REVIEWED BY THE ED CLINICIAN:    RADIOLOGY: All x-rays, CT, MRI, and formal ultrasound images (except ED bedside ultrasound) are read by the radiologist, see reports below, unless otherwise noted in MDM or here.   Reports

## 2023-04-05 ENCOUNTER — TELEPHONE (OUTPATIENT)
Dept: FAMILY MEDICINE CLINIC | Age: 58
End: 2023-04-05

## 2023-04-05 ENCOUNTER — OFFICE VISIT (OUTPATIENT)
Dept: FAMILY MEDICINE CLINIC | Age: 58
End: 2023-04-05
Payer: MEDICAID

## 2023-04-05 VITALS
WEIGHT: 147 LBS | DIASTOLIC BLOOD PRESSURE: 70 MMHG | SYSTOLIC BLOOD PRESSURE: 120 MMHG | OXYGEN SATURATION: 96 % | TEMPERATURE: 97.3 F | HEIGHT: 65 IN | HEART RATE: 76 BPM | BODY MASS INDEX: 24.49 KG/M2

## 2023-04-05 DIAGNOSIS — Z87.891 PERSONAL HISTORY OF TOBACCO USE: ICD-10-CM

## 2023-04-05 DIAGNOSIS — M79.89 SWELLING OF LEFT HAND: ICD-10-CM

## 2023-04-05 DIAGNOSIS — Z12.11 SCREENING FOR COLON CANCER: ICD-10-CM

## 2023-04-05 DIAGNOSIS — I10 PRIMARY HYPERTENSION: ICD-10-CM

## 2023-04-05 DIAGNOSIS — Z15.89 JAK-2 GENE MUTATION: ICD-10-CM

## 2023-04-05 DIAGNOSIS — J41.0 SIMPLE CHRONIC BRONCHITIS (HCC): ICD-10-CM

## 2023-04-05 DIAGNOSIS — Z12.31 SCREENING MAMMOGRAM FOR HIGH-RISK PATIENT: ICD-10-CM

## 2023-04-05 DIAGNOSIS — M25.40 PAINFUL SWELLING OF JOINT: ICD-10-CM

## 2023-04-05 DIAGNOSIS — I48.21 PERMANENT ATRIAL FIBRILLATION (HCC): ICD-10-CM

## 2023-04-05 DIAGNOSIS — N10 ACUTE PYELONEPHRITIS: Primary | ICD-10-CM

## 2023-04-05 PROCEDURE — 99214 OFFICE O/P EST MOD 30 MIN: CPT | Performed by: FAMILY MEDICINE

## 2023-04-05 PROCEDURE — 3078F DIAST BP <80 MM HG: CPT | Performed by: FAMILY MEDICINE

## 2023-04-05 PROCEDURE — 3074F SYST BP LT 130 MM HG: CPT | Performed by: FAMILY MEDICINE

## 2023-04-05 RX ORDER — METHYLPREDNISOLONE 4 MG/1
TABLET ORAL
Qty: 1 KIT | Refills: 0 | Status: SHIPPED | OUTPATIENT
Start: 2023-04-05 | End: 2023-04-11

## 2023-04-05 RX ORDER — IBUPROFEN 800 MG/1
800 TABLET ORAL EVERY 8 HOURS PRN
Qty: 60 TABLET | Refills: 0 | Status: SHIPPED | OUTPATIENT
Start: 2023-04-05

## 2023-04-05 SDOH — ECONOMIC STABILITY: INCOME INSECURITY: HOW HARD IS IT FOR YOU TO PAY FOR THE VERY BASICS LIKE FOOD, HOUSING, MEDICAL CARE, AND HEATING?: VERY HARD

## 2023-04-05 SDOH — ECONOMIC STABILITY: FOOD INSECURITY: WITHIN THE PAST 12 MONTHS, YOU WORRIED THAT YOUR FOOD WOULD RUN OUT BEFORE YOU GOT MONEY TO BUY MORE.: NEVER TRUE

## 2023-04-05 SDOH — ECONOMIC STABILITY: FOOD INSECURITY: WITHIN THE PAST 12 MONTHS, THE FOOD YOU BOUGHT JUST DIDN'T LAST AND YOU DIDN'T HAVE MONEY TO GET MORE.: NEVER TRUE

## 2023-04-05 SDOH — ECONOMIC STABILITY: HOUSING INSECURITY
IN THE LAST 12 MONTHS, WAS THERE A TIME WHEN YOU DID NOT HAVE A STEADY PLACE TO SLEEP OR SLEPT IN A SHELTER (INCLUDING NOW)?: NO

## 2023-04-05 ASSESSMENT — ENCOUNTER SYMPTOMS
BLOOD IN STOOL: 0
EYE REDNESS: 0
STRIDOR: 0
BACK PAIN: 1
RHINORRHEA: 0
TROUBLE SWALLOWING: 0
CONSTIPATION: 0
CHEST TIGHTNESS: 0
DIARRHEA: 0
COLOR CHANGE: 0
SORE THROAT: 0
SHORTNESS OF BREATH: 0
ABDOMINAL DISTENTION: 0
COUGH: 0
VOMITING: 0
NAUSEA: 0
SINUS PRESSURE: 0
ABDOMINAL PAIN: 0
RECTAL PAIN: 0
WHEEZING: 0

## 2023-04-05 NOTE — PATIENT INSTRUCTIONS
Thank you for choosing [facility]) as your healthcare provider as your care is important to us. Please arrive at your appointment on time. If you are unable to make your appointment, please call our office as soon as possible so that we may reschedule your appointment. Missing 3 appointments in a calendar year without notifying the office may lead to dismissal from the practice. We appreciate you calling at least 24 hours in advance, when possible. Thank you. New Updates for [facility]    Thank you for choosing [facility] to give you the best care! [facility]) is always trying to think of new ways to help their patients. We are asking all patients to try out the new digital registration that is now available through your [facility] account Via the khloe you're now able to update your personal and registration information prior to your upcoming appointment. This will save you time once you arrive at the office to check-in, not to mention your information remains safe!! Many other perks come from signing up for an account, such as:  Requesting refills  Scheduling an appointment  Completing an E-Visit  Sending a message to the office/provider  Having access to your medication list  Paying your bill/copay prior to your appointment  Scheduling your yearly mammogram  Review your test results    If you are not familiar with [facility] please ask one of us and we will be happy to answer any questions or help you set-up your account.       Your [employer],

## 2023-04-05 NOTE — PROGRESS NOTES
Visit Information    Have you changed or started any medications since your last visit including any over-the-counter medicines, vitamins, or herbal medicines? no   Have you stopped taking any of your medications? Is so, why? -  no  Are you having any side effects from any of your medications? - no    Have you seen any other physician or provider since your last visit? Yes in ER  Have you had any other diagnostic tests since your last visit? yes -    Have you been seen in the emergency room and/or had an admission in a hospital since we last saw you?  yes - ed follow up  dx:Acute pyelonephritis  Have you had your routine dental cleaning in the past 6 months?  no     Do you have an active MyChart account? If no, what is the barrier?   Yes    Patient Care Team:  Michael Salamanca MD as PCP - General (Family Medicine)  Michael Salamanca MD as PCP - Empaneled Provider  Ninfa Burks MD as Surgeon (Colon and Rectal Surgery)    Medical History Review  Past Medical, Family, and Social History reviewed and does contribute to the patient presenting condition    Health Maintenance   Topic Date Due    COVID-19 Vaccine (1) Never done    Shingles vaccine (1 of 2) Never done    Low dose CT lung screening  Never done    Colorectal Cancer Screen  08/30/2019    Lipids  07/07/2023    Flu vaccine (Season Ended) 08/01/2023    A1C test (Diabetic or Prediabetic)  11/04/2023    Depression Monitoring  01/04/2024    Breast cancer screen  03/30/2024    Cervical cancer screen  01/04/2026    DTaP/Tdap/Td vaccine (2 - Td or Tdap) 10/14/2027    Pneumococcal 0-64 years Vaccine  Completed    Hepatitis C screen  Completed    HIV screen  Completed    Hepatitis A vaccine  Aged Out    Hib vaccine  Aged Out    Meningococcal (ACWY) vaccine  Aged Out
Eyes:      General:         Right eye: No discharge. Left eye: No discharge. Extraocular Movements: Extraocular movements intact. Conjunctiva/sclera: Conjunctivae normal.      Pupils: Pupils are equal, round, and reactive to light. Neck:      Thyroid: No thyromegaly. Cardiovascular:      Rate and Rhythm: Normal rate. Rhythm irregular. Heart sounds: Normal heart sounds. No murmur heard. Pulmonary:      Effort: Pulmonary effort is normal. No respiratory distress. Breath sounds: Normal breath sounds. No wheezing or rhonchi. Abdominal:      General: Bowel sounds are normal. There is no distension. Palpations: Abdomen is soft. There is no mass. Tenderness: There is no abdominal tenderness. There is no guarding or rebound. Musculoskeletal:      Left hand: Swelling and tenderness present. Decreased range of motion. Decreased strength. Arms:       Cervical back: Normal range of motion and neck supple. Spasms present. No rigidity. Thoracic back: Spasms present. Normal range of motion. Lumbar back: Spasms present. No tenderness. Decreased range of motion. Lymphadenopathy:      Cervical: No cervical adenopathy. Skin:     Coloration: Skin is not jaundiced or pale. Findings: No bruising, erythema or rash. Neurological:      General: No focal deficit present. Mental Status: She is alert and oriented to person, place, and time. Cranial Nerves: No cranial nerve deficit. Sensory: No sensory deficit. Motor: No weakness or tremor. Coordination: Coordination normal.      Gait: Gait and tandem walk normal.      Deep Tendon Reflexes: Reflexes are normal and symmetric. Psychiatric:         Attention and Perception: Attention and perception normal. She is attentive. Mood and Affect: Mood is anxious. Mood is not depressed. Affect is not tearful. Speech: She is communicative.  Speech is not rapid and pressured, delayed or

## 2023-04-05 NOTE — TELEPHONE ENCOUNTER
[facility] Transitions Initial Follow Up Call    Outreach made within 2 business days of discharge: No    Patient: Queta Tyler Patient : 1965   MRN: [id number]  Reason for Admission: There are no discharge diagnoses documented for the most recent discharge. Discharge Date: 3/29/23       Spoke with: patient     If Virtual visit made for hospital follow up, advise patient to make sure to have family member present to help assist with visit. Please make sure mobile number is updated in patient chart. Discharge department/facility:  [facility] Interactive Patient Contact:  Was patient able to fill all prescriptions: Yes  Was patient instructed to bring all medications to the follow-up visit: Yes  Is patient taking all medications as directed in the discharge summary?  Yes  Does patient understand their discharge instructions: Yes  Does patient have questions or concerns that need addressed prior to 7-14 day follow up office visit: yes - follow up     Scheduled appointment with PCP within 7-14 days    Follow Up  Future Appointments   Date Time Provider Tiki Lewis   2023 11:00 AM [facility] [address]   2023  1:30 PM MD MEENA Greco 2       Mak Quiroz

## 2023-04-23 DIAGNOSIS — K58.1 IRRITABLE BOWEL SYNDROME WITH CONSTIPATION: ICD-10-CM

## 2023-04-24 RX ORDER — FAMOTIDINE 20 MG/1
TABLET, FILM COATED ORAL
Qty: 60 TABLET | Refills: 3 | Status: SHIPPED | OUTPATIENT
Start: 2023-04-24

## 2023-04-24 NOTE — TELEPHONE ENCOUNTER
Please Approve or Refuse.   Send to Pharmacy per Pt's Request:      Next Visit Date:  7/31/2023   Last Visit Date: 4/5/2023    Hemoglobin A1C (%)   Date Value   11/04/2022 5.6   07/07/2022 6.2 (H)   08/01/2019 5.6             ( goal A1C is < 7)   BP Readings from Last 3 Encounters:   04/05/23 120/70   03/29/23 (!) 129/59   03/28/23 110/72          (goal 120/80)  BUN   Date Value Ref Range Status   03/29/2023 8 6 - 20 mg/dL Final     Creatinine   Date Value Ref Range Status   03/29/2023 0.58 0.50 - 0.90 mg/dL Final     Potassium   Date Value Ref Range Status   03/29/2023 3.9 3.7 - 5.3 mmol/L Final

## 2023-05-14 DIAGNOSIS — E78.2 MIXED HYPERLIPIDEMIA: ICD-10-CM

## 2023-05-15 RX ORDER — ATORVASTATIN CALCIUM 40 MG/1
40 TABLET, FILM COATED ORAL DAILY
Qty: 90 TABLET | Refills: 1 | Status: SHIPPED | OUTPATIENT
Start: 2023-05-15

## 2023-05-15 NOTE — TELEPHONE ENCOUNTER
Please Approve or Refuse.   Send to Pharmacy per Pt's Request: jose r      Next Visit Date:  7/31/2023   Last Visit Date: 4/5/2023    Hemoglobin A1C (%)   Date Value   11/04/2022 5.6   07/07/2022 6.2 (H)   08/01/2019 5.6             ( goal A1C is < 7)   BP Readings from Last 3 Encounters:   04/05/23 120/70   03/29/23 (!) 129/59   03/28/23 110/72          (goal 120/80)  BUN   Date Value Ref Range Status   03/29/2023 8 6 - 20 mg/dL Final     Creatinine   Date Value Ref Range Status   03/29/2023 0.58 0.50 - 0.90 mg/dL Final     Potassium   Date Value Ref Range Status   03/29/2023 3.9 3.7 - 5.3 mmol/L Final

## 2023-05-24 ENCOUNTER — HOSPITAL ENCOUNTER (OUTPATIENT)
Dept: INFUSION THERAPY | Age: 58
Setting detail: INFUSION SERIES
Discharge: HOME | End: 2023-05-24
Payer: MEDICAID

## 2023-05-24 DIAGNOSIS — D45 POLYCYTHEMIA VERA (HCC): Primary | ICD-10-CM

## 2023-05-24 LAB
ERYTHROCYTE [DISTWIDTH] IN BLOOD BY AUTOMATED COUNT: 18.9 % (ref 11.5–14.9)
HCT VFR BLD AUTO: 38.4 % (ref 36–46)
HGB BLD-MCNC: 13.1 G/DL (ref 12–16)
MCH RBC QN AUTO: 22.2 PG (ref 26–34)
MCHC RBC AUTO-ENTMCNC: 34.2 G/DL (ref 31–37)
MCV RBC AUTO: 65 FL (ref 80–100)
PLATELET # BLD AUTO: 524 K/UL (ref 150–450)
PMV BLD AUTO: 7.4 FL (ref 6–12)
RBC # BLD AUTO: 5.9 M/UL (ref 4–5.2)
WBC OTHER # BLD: 10.3 K/UL (ref 3.5–11)

## 2023-05-24 PROCEDURE — 85027 COMPLETE CBC AUTOMATED: CPT

## 2023-05-24 PROCEDURE — 36415 COLL VENOUS BLD VENIPUNCTURE: CPT

## 2023-05-24 NOTE — PROGRESS NOTES
Pt arrived for therapeutic phlebotomy. Labs drawn. Hct = 38.4, therefore procedure NOT indicated per written parameters. Pt discharged home, ambulatory per self.

## 2023-06-07 ENCOUNTER — HOSPITAL ENCOUNTER (OUTPATIENT)
Age: 58
Setting detail: SPECIMEN
Discharge: HOME | End: 2023-06-07
Payer: MEDICAID

## 2023-06-07 ENCOUNTER — HOSPITAL ENCOUNTER (OUTPATIENT)
Age: 58
Discharge: HOME | End: 2023-06-07
Payer: MEDICAID

## 2023-06-07 DIAGNOSIS — D45 POLYCYTHEMIA VERA (HCC): ICD-10-CM

## 2023-06-07 DIAGNOSIS — N10 ACUTE PYELONEPHRITIS: ICD-10-CM

## 2023-06-07 DIAGNOSIS — D47.3 ESSENTIAL THROMBOCYTHEMIA (HCC): ICD-10-CM

## 2023-06-07 LAB
ALBUMIN SERPL-MCNC: 4.2 G/DL (ref 3.5–5.2)
ALP SERPL-CCNC: 119 U/L (ref 35–104)
ALT SERPL-CCNC: 13 U/L (ref 5–33)
ANION GAP SERPL CALCULATED.3IONS-SCNC: 11 MMOL/L (ref 9–17)
AST SERPL-CCNC: 24 U/L
BASOPHILS # BLD: 0.12 K/UL (ref 0–0.2)
BASOPHILS NFR BLD: 1 % (ref 0–2)
BILIRUB SERPL-MCNC: 0.4 MG/DL (ref 0.3–1.2)
BILIRUB UR QL STRIP: NEGATIVE
BUN SERPL-MCNC: 8 MG/DL (ref 6–20)
CALCIUM SERPL-MCNC: 9.3 MG/DL (ref 8.6–10.4)
CHLORIDE SERPL-SCNC: 106 MMOL/L (ref 98–107)
CLARITY UR: CLEAR
CO2 SERPL-SCNC: 24 MMOL/L (ref 20–31)
COLOR UR: YELLOW
COMMENT UA: NORMAL
CREAT SERPL-MCNC: 0.63 MG/DL (ref 0.5–0.9)
EOSINOPHIL # BLD: 0.47 K/UL (ref 0–0.4)
EOSINOPHILS RELATIVE PERCENT: 4 % (ref 0–4)
ERYTHROCYTE [DISTWIDTH] IN BLOOD BY AUTOMATED COUNT: 20.4 % (ref 11.5–14.9)
FERRITIN SERPL-MCNC: 8 NG/ML (ref 13–150)
FOLATE SERPL-MCNC: 10.4 NG/ML
GFR SERPL CREATININE-BSD FRML MDRD: >60 ML/MIN/1.73M2
GLUCOSE SERPL-MCNC: 111 MG/DL (ref 70–99)
GLUCOSE UR STRIP-MCNC: NEGATIVE MG/DL
HCT VFR BLD AUTO: 45.3 % (ref 36–46)
HGB BLD-MCNC: 14.5 G/DL (ref 12–16)
HGB UR QL STRIP.AUTO: NEGATIVE
IRON SATN MFR SERPL: 7 % (ref 20–55)
IRON SERPL-MCNC: 30 UG/DL (ref 37–145)
KETONES UR STRIP-MCNC: NEGATIVE MG/DL
LEUKOCYTE ESTERASE UR QL STRIP: NEGATIVE
LYMPHOCYTES # BLD: 27 % (ref 24–44)
LYMPHOCYTES NFR BLD: 3.16 K/UL (ref 1–4.8)
MCH RBC QN AUTO: 21.3 PG (ref 26–34)
MCHC RBC AUTO-ENTMCNC: 32 G/DL (ref 31–37)
MCV RBC AUTO: 66.5 FL (ref 80–100)
MONOCYTES NFR BLD: 0.59 K/UL (ref 0.1–1.3)
MONOCYTES NFR BLD: 5 % (ref 1–7)
MORPHOLOGY: ABNORMAL
NEUTROPHILS NFR BLD: 63 % (ref 36–66)
NEUTS SEG NFR BLD: 7.36 K/UL (ref 1.3–9.1)
NITRITE UR QL STRIP: NEGATIVE
PH UR STRIP: 6.5 [PH] (ref 5–8)
PLATELET # BLD AUTO: 668 K/UL (ref 150–450)
PMV BLD AUTO: 7.7 FL (ref 6–12)
POTASSIUM SERPL-SCNC: 4 MMOL/L (ref 3.7–5.3)
PROT SERPL-MCNC: 7.7 G/DL (ref 6.4–8.3)
PROT UR STRIP-MCNC: NEGATIVE MG/DL
RBC # BLD AUTO: 6.82 M/UL (ref 4–5.2)
SODIUM SERPL-SCNC: 141 MMOL/L (ref 135–144)
SP GR UR STRIP: 1 (ref 1–1.03)
TIBC SERPL-MCNC: 425 UG/DL (ref 250–450)
UNSATURATED IRON BINDING CAPACITY: 395 UG/DL (ref 112–347)
UROBILINOGEN UR STRIP-ACNC: NORMAL
VIT B12 SERPL-MCNC: 724 PG/ML (ref 232–1245)
WBC OTHER # BLD: 11.7 K/UL (ref 3.5–11)

## 2023-06-07 PROCEDURE — 80053 COMPREHEN METABOLIC PANEL: CPT

## 2023-06-07 PROCEDURE — 81003 URINALYSIS AUTO W/O SCOPE: CPT

## 2023-06-07 PROCEDURE — 36415 COLL VENOUS BLD VENIPUNCTURE: CPT

## 2023-06-07 PROCEDURE — 82728 ASSAY OF FERRITIN: CPT

## 2023-06-07 PROCEDURE — 83540 ASSAY OF IRON: CPT

## 2023-06-07 PROCEDURE — 83550 IRON BINDING TEST: CPT

## 2023-06-07 PROCEDURE — 82607 VITAMIN B-12: CPT

## 2023-06-07 PROCEDURE — 82746 ASSAY OF FOLIC ACID SERUM: CPT

## 2023-06-07 PROCEDURE — 85027 COMPLETE CBC AUTOMATED: CPT

## 2023-06-07 NOTE — RESULT ENCOUNTER NOTE
Please notify patient:  Normal labs      Future Appointments  6/9/2023   1:30 PM    Gavi Orozco MD         [facility]           [facility]  6/21/2023  11:00 AM   [facility] INFUSION [facility]* [facility]        [place]  7/10/2023  1:00 PM    [address] [facility] DIGITAL RM             [facility] MAMMO          [address] [facility] Radiolog  7/31/2023  12:30 PM   Marlow Babinski, MD           corwin [place]son

## 2023-06-09 ENCOUNTER — TELEPHONE (OUTPATIENT)
Dept: ONCOLOGY | Age: 58
End: 2023-06-09

## 2023-06-09 ENCOUNTER — OFFICE VISIT (OUTPATIENT)
Dept: ONCOLOGY | Age: 58
End: 2023-06-09
Payer: MEDICAID

## 2023-06-09 VITALS
HEART RATE: 71 BPM | BODY MASS INDEX: 24.46 KG/M2 | SYSTOLIC BLOOD PRESSURE: 129 MMHG | DIASTOLIC BLOOD PRESSURE: 87 MMHG | WEIGHT: 147 LBS | TEMPERATURE: 96.8 F

## 2023-06-09 DIAGNOSIS — D45 POLYCYTHEMIA VERA (HCC): Primary | ICD-10-CM

## 2023-06-09 DIAGNOSIS — Z15.89 JAK-2 GENE MUTATION: ICD-10-CM

## 2023-06-09 DIAGNOSIS — D47.1 MPN (MYELOPROLIFERATIVE NEOPLASM) (HCC): ICD-10-CM

## 2023-06-09 PROCEDURE — 3078F DIAST BP <80 MM HG: CPT | Performed by: INTERNAL MEDICINE

## 2023-06-09 PROCEDURE — 3074F SYST BP LT 130 MM HG: CPT | Performed by: INTERNAL MEDICINE

## 2023-06-09 PROCEDURE — 99211 OFF/OP EST MAY X REQ PHY/QHP: CPT | Performed by: INTERNAL MEDICINE

## 2023-06-09 PROCEDURE — 99215 OFFICE O/P EST HI 40 MIN: CPT | Performed by: INTERNAL MEDICINE

## 2023-06-09 RX ORDER — HYDROXYUREA 500 MG/1
500 CAPSULE ORAL DAILY
Status: SHIPPED | OUTPATIENT
Start: 2023-06-09

## 2023-06-09 NOTE — TELEPHONE ENCOUNTER
AVS FROM 6/9/23    Rtx in 4 weeks    RV ON 7/7/23     PT was given AVS and appt schedule    Electronically signed by Kleber Renee on 6/9/2023 at 1:40 PM

## 2023-06-16 RX ORDER — HYDROXYUREA 500 MG/1
500 CAPSULE ORAL DAILY
Status: DISCONTINUED | OUTPATIENT
Start: 2023-06-16 | End: 2024-02-07 | Stop reason: SDUPTHER

## 2023-06-20 DIAGNOSIS — J41.0 SIMPLE CHRONIC BRONCHITIS (HCC): ICD-10-CM

## 2023-06-21 ENCOUNTER — HOSPITAL ENCOUNTER (OUTPATIENT)
Dept: INFUSION THERAPY | Age: 58
Setting detail: INFUSION SERIES
Discharge: HOME | End: 2023-06-21
Payer: MEDICAID

## 2023-06-21 VITALS
TEMPERATURE: 96.4 F | HEART RATE: 63 BPM | SYSTOLIC BLOOD PRESSURE: 130 MMHG | RESPIRATION RATE: 17 BRPM | OXYGEN SATURATION: 97 % | DIASTOLIC BLOOD PRESSURE: 78 MMHG

## 2023-06-21 DIAGNOSIS — D45 POLYCYTHEMIA VERA (HCC): Primary | ICD-10-CM

## 2023-06-21 LAB
HCT VFR BLD AUTO: 42.8 % (ref 36–46)
HGB BLD-MCNC: 13.6 G/DL (ref 12–16)

## 2023-06-21 PROCEDURE — 85018 HEMOGLOBIN: CPT

## 2023-06-21 PROCEDURE — 85014 HEMATOCRIT: CPT

## 2023-06-21 PROCEDURE — 99195 PHLEBOTOMY: CPT

## 2023-06-21 PROCEDURE — 36415 COLL VENOUS BLD VENIPUNCTURE: CPT

## 2023-06-21 RX ORDER — TIOTROPIUM BROMIDE INHALATION SPRAY 1.56 UG/1
SPRAY, METERED RESPIRATORY (INHALATION)
Qty: 4 G | Refills: 5 | Status: SHIPPED | OUTPATIENT
Start: 2023-06-21

## 2023-06-21 RX ORDER — HYDROXYUREA 500 MG/1
500 CAPSULE ORAL DAILY
Qty: 30 CAPSULE | Refills: 0 | Status: SHIPPED | OUTPATIENT
Start: 2023-06-21

## 2023-06-21 NOTE — TELEPHONE ENCOUNTER
Please Approve or Refuse.   Send to Pharmacy per Pt's Request:      Next Visit Date:  7/31/2023   Last Visit Date: 4/5/2023    Hemoglobin A1C (%)   Date Value   11/04/2022 5.6   07/07/2022 6.2 (H)   08/01/2019 5.6             ( goal A1C is < 7)   BP Readings from Last 3 Encounters:   06/09/23 129/87   04/05/23 120/70   03/29/23 (!) 129/59          (goal 120/80)  BUN   Date Value Ref Range Status   06/07/2023 8 6 - 20 mg/dL Final     Creatinine   Date Value Ref Range Status   06/07/2023 0.63 0.50 - 0.90 mg/dL Final     Potassium   Date Value Ref Range Status   06/07/2023 4.0 3.7 - 5.3 mmol/L Final

## 2023-06-21 NOTE — PROGRESS NOTES
Pt arrived ambulatory to outpatient infusion. Vitals completed. Labs drawn, HCT 42.8, per orders phlebotomy completed. 500 ml removed, Pt tolerated well. Vitals completed. Pt remained post infusion for apr 20 mins, no adverse reactions noted. Pt ambulatory at discharge.

## 2023-07-18 ENCOUNTER — HOSPITAL ENCOUNTER (OUTPATIENT)
Age: 58
Discharge: HOME | End: 2023-07-18
Payer: MEDICAID

## 2023-07-18 ENCOUNTER — TELEPHONE (OUTPATIENT)
Dept: ONCOLOGY | Age: 58
End: 2023-07-18

## 2023-07-18 ENCOUNTER — OFFICE VISIT (OUTPATIENT)
Dept: ONCOLOGY | Age: 58
End: 2023-07-18
Payer: MEDICAID

## 2023-07-18 VITALS
HEART RATE: 51 BPM | DIASTOLIC BLOOD PRESSURE: 68 MMHG | SYSTOLIC BLOOD PRESSURE: 126 MMHG | TEMPERATURE: 96.6 F | BODY MASS INDEX: 23.56 KG/M2 | WEIGHT: 141.6 LBS

## 2023-07-18 DIAGNOSIS — R63.4 WEIGHT LOSS: ICD-10-CM

## 2023-07-18 DIAGNOSIS — D45 POLYCYTHEMIA VERA (HCC): ICD-10-CM

## 2023-07-18 DIAGNOSIS — D47.1 MPN (MYELOPROLIFERATIVE NEOPLASM) (HCC): ICD-10-CM

## 2023-07-18 DIAGNOSIS — D45 POLYCYTHEMIA VERA (HCC): Primary | ICD-10-CM

## 2023-07-18 DIAGNOSIS — D47.3 ESSENTIAL THROMBOCYTHEMIA (HCC): ICD-10-CM

## 2023-07-18 DIAGNOSIS — Z15.89 JAK-2 GENE MUTATION: ICD-10-CM

## 2023-07-18 LAB
ALBUMIN SERPL-MCNC: 4.1 G/DL (ref 3.5–5.2)
ALP SERPL-CCNC: 107 U/L (ref 35–104)
ALT SERPL-CCNC: 12 U/L (ref 5–33)
ANION GAP SERPL CALCULATED.3IONS-SCNC: 14 MMOL/L (ref 9–17)
AST SERPL-CCNC: 18 U/L
BILIRUB SERPL-MCNC: 0.3 MG/DL (ref 0.3–1.2)
BUN SERPL-MCNC: 9 MG/DL (ref 6–20)
CALCIUM SERPL-MCNC: 9.3 MG/DL (ref 8.6–10.4)
CHLORIDE SERPL-SCNC: 104 MMOL/L (ref 98–107)
CO2 SERPL-SCNC: 25 MMOL/L (ref 20–31)
CREAT SERPL-MCNC: 0.7 MG/DL (ref 0.5–0.9)
ERYTHROCYTE [DISTWIDTH] IN BLOOD BY AUTOMATED COUNT: 24.1 % (ref 11.5–14.9)
GFR SERPL CREATININE-BSD FRML MDRD: >60 ML/MIN/1.73M2
GLUCOSE SERPL-MCNC: 95 MG/DL (ref 70–99)
HCT VFR BLD AUTO: 40.6 % (ref 36–46)
HGB BLD-MCNC: 13.1 G/DL (ref 12–16)
MCH RBC QN AUTO: 22.9 PG (ref 26–34)
MCHC RBC AUTO-ENTMCNC: 32.3 G/DL (ref 31–37)
MCV RBC AUTO: 70.9 FL (ref 80–100)
PLATELET # BLD AUTO: 322 K/UL (ref 150–450)
PMV BLD AUTO: 8.6 FL (ref 6–12)
POTASSIUM SERPL-SCNC: 3.6 MMOL/L (ref 3.7–5.3)
PROT SERPL-MCNC: 7.3 G/DL (ref 6.4–8.3)
RBC # BLD AUTO: 5.74 M/UL (ref 4–5.2)
SODIUM SERPL-SCNC: 143 MMOL/L (ref 135–144)
WBC OTHER # BLD: 8.1 K/UL (ref 3.5–11)

## 2023-07-18 PROCEDURE — 85027 COMPLETE CBC AUTOMATED: CPT

## 2023-07-18 PROCEDURE — 3074F SYST BP LT 130 MM HG: CPT | Performed by: INTERNAL MEDICINE

## 2023-07-18 PROCEDURE — 99211 OFF/OP EST MAY X REQ PHY/QHP: CPT

## 2023-07-18 PROCEDURE — 99214 OFFICE O/P EST MOD 30 MIN: CPT | Performed by: INTERNAL MEDICINE

## 2023-07-18 PROCEDURE — 36415 COLL VENOUS BLD VENIPUNCTURE: CPT

## 2023-07-18 PROCEDURE — 3078F DIAST BP <80 MM HG: CPT | Performed by: INTERNAL MEDICINE

## 2023-07-18 PROCEDURE — 80053 COMPREHEN METABOLIC PANEL: CPT

## 2023-07-18 NOTE — PROGRESS NOTES
achievable. COMPARISON:  07/22/2019    HISTORY:  ORDERING SYSTEM PROVIDED HISTORY: Flank pain, hematuria  TECHNOLOGIST PROVIDED HISTORY:    Flank pain, hematuria  Decision Support Exception - unselect if not a suspected or confirmed  emergency medical condition->Emergency Medical Condition (MA)  Reason for Exam: Flank pain, hematuria for one day    FINDINGS:  Lower Chest: Visualized lungs are clear. Noncontrast imaging of the base of  the heart is unremarkable. Organs: No acute or suspicious hepatic abnormalities. Hepatic cysts again  seen, a benign finding which requires no specific follow-up. Gallbladder  unremarkable. Noncontrast imaging of the spleen unremarkable. There is a 1.9 cm right adrenal nodule measuring -14 Hounsfield units. There  is a 1.3 cm left adrenal nodule measuring -18 Hounsfield units. Noncontrast imaging of the pancreas unremarkable. No acute renal abnormality identified. No hydronephrosis or hydroureter. GI/Bowel: Distal esophagus and stomach unremarkable. No small bowel  abnormalities are identified. Large bowel is unremarkable. Appendix unremarkable. Pelvis: Uterus and adnexa regions are unremarkable. There is suggestion of  urinary bladder mural thickening, but the urinary bladder is incompletely  distended. Slight pericystic fat stranding is suggested. No free pelvic  fluid. Peritoneum/Retroperitoneum: Abdominal aorta normal in caliber. Heavy  vascular calcifications noted. No lymphadenopathy. Bones/Soft Tissues: No acute or suspicious bony abnormalities are identified. Impression: No definite acute abnormality identified. There is suggestion of urinary bladder mural thickening. That could be  partially artifactual felt secondary to incomplete distention. Nonetheless,  please correlate with clinical evidence of cystitis. Bilateral adrenal nodules, most compatible with benign lipid rich adenomas. See full recommendations below.

## 2023-07-18 NOTE — TELEPHONE ENCOUNTER
Avs from 7/18/23     Rtc in one month    Rv on 8/22/23     Labs week prior (cbc,cmp)     PT was given AVS and appt schedule    Electronically signed by Chaya Lora on 7/18/2023 at 11:55 AM

## 2023-07-19 DIAGNOSIS — J41.0 SIMPLE CHRONIC BRONCHITIS (HCC): ICD-10-CM

## 2023-07-19 RX ORDER — ALBUTEROL SULFATE 90 UG/1
AEROSOL, METERED RESPIRATORY (INHALATION)
Qty: 18 G | Refills: 2 | Status: SHIPPED | OUTPATIENT
Start: 2023-07-19

## 2023-07-19 NOTE — TELEPHONE ENCOUNTER
Please Approve or Refuse.   Send to Pharmacy per Pt's Request:      Next Visit Date:  7/31/2023   Last Visit Date: 4/5/2023    Hemoglobin A1C (%)   Date Value   11/04/2022 5.6   07/07/2022 6.2 (H)   08/01/2019 5.6             ( goal A1C is < 7)   BP Readings from Last 3 Encounters:   07/18/23 126/68   06/21/23 130/78   06/09/23 129/87          (goal 120/80)  BUN   Date Value Ref Range Status   07/18/2023 9 6 - 20 mg/dL Final     Creatinine   Date Value Ref Range Status   07/18/2023 0.7 0.5 - 0.9 mg/dL Final     Potassium   Date Value Ref Range Status   07/18/2023 3.6 (L) 3.7 - 5.3 mmol/L Final

## 2023-07-31 DIAGNOSIS — D45 POLYCYTHEMIA VERA (HCC): Primary | ICD-10-CM

## 2023-07-31 PROBLEM — N10 ACUTE PYELONEPHRITIS: Status: ACTIVE | Noted: 2023-07-31

## 2023-07-31 PROBLEM — Z15.89: Status: ACTIVE | Noted: 2022-09-28

## 2023-09-11 DIAGNOSIS — E55.9 VITAMIN D DEFICIENCY: ICD-10-CM

## 2023-09-11 DIAGNOSIS — I47.20 VENTRICULAR TACHYARRHYTHMIA (HCC): ICD-10-CM

## 2023-09-11 DIAGNOSIS — R73.03 PREDIABETES: ICD-10-CM

## 2023-09-11 DIAGNOSIS — K58.1 IRRITABLE BOWEL SYNDROME WITH CONSTIPATION: ICD-10-CM

## 2023-09-11 DIAGNOSIS — E78.2 MIXED HYPERLIPIDEMIA: ICD-10-CM

## 2023-09-11 NOTE — TELEPHONE ENCOUNTER
Please Approve or Refuse.   Send to Pharmacy per Pt's Request: [facility]     Next Visit Date:  pt will call back to r/s   Last Visit Date: 4/5/2023    Hemoglobin A1C (%)   Date Value   11/04/2022 5.6   07/07/2022 6.2 (H)   08/01/2019 5.6             ( goal A1C is < 7)   BP Readings from Last 3 Encounters:   07/18/23 126/68   06/21/23 130/78   06/09/23 129/87          (goal 120/80)  BUN   Date Value Ref Range Status   07/18/2023 9 6 - 20 mg/dL Final     Creatinine   Date Value Ref Range Status   07/18/2023 0.7 0.5 - 0.9 mg/dL Final     Potassium   Date Value Ref Range Status   07/18/2023 3.6 (L) 3.7 - 5.3 mmol/L Final

## 2023-09-12 RX ORDER — METFORMIN HYDROCHLORIDE 500 MG/1
500 TABLET, EXTENDED RELEASE ORAL DAILY
Qty: 90 TABLET | Refills: 3 | Status: SHIPPED | OUTPATIENT
Start: 2023-09-12

## 2023-09-12 RX ORDER — ERGOCALCIFEROL 1.25 MG/1
50000 CAPSULE ORAL WEEKLY
Qty: 12 CAPSULE | Refills: 0 | Status: SHIPPED | OUTPATIENT
Start: 2023-09-12

## 2023-09-12 RX ORDER — METOPROLOL SUCCINATE 25 MG/1
25 TABLET, EXTENDED RELEASE ORAL DAILY
Qty: 90 TABLET | Refills: 3 | Status: SHIPPED | OUTPATIENT
Start: 2023-09-12

## 2023-09-12 RX ORDER — ATORVASTATIN CALCIUM 40 MG/1
40 TABLET, FILM COATED ORAL DAILY
Qty: 90 TABLET | Refills: 1 | Status: SHIPPED | OUTPATIENT
Start: 2023-09-12

## 2023-09-12 RX ORDER — ASPIRIN 81 MG/1
TABLET ORAL
Qty: 90 TABLET | Refills: 3 | Status: SHIPPED | OUTPATIENT
Start: 2023-09-12

## 2023-09-12 RX ORDER — FAMOTIDINE 20 MG/1
20 TABLET, FILM COATED ORAL 2 TIMES DAILY
Qty: 60 TABLET | Refills: 3 | Status: SHIPPED | OUTPATIENT
Start: 2023-09-12

## 2023-10-03 ENCOUNTER — TELEPHONE (OUTPATIENT)
Dept: FAMILY MEDICINE CLINIC | Age: 58
End: 2023-10-03

## 2023-10-03 DIAGNOSIS — E78.2 MIXED HYPERLIPIDEMIA: ICD-10-CM

## 2023-10-03 DIAGNOSIS — E55.9 VITAMIN D DEFICIENCY: ICD-10-CM

## 2023-10-03 DIAGNOSIS — I10 PRIMARY HYPERTENSION: ICD-10-CM

## 2023-10-03 DIAGNOSIS — J41.0 SIMPLE CHRONIC BRONCHITIS (HCC): ICD-10-CM

## 2023-10-03 DIAGNOSIS — R73.03 PREDIABETES: Primary | ICD-10-CM

## 2023-10-03 RX ORDER — ALBUTEROL SULFATE 90 UG/1
AEROSOL, METERED RESPIRATORY (INHALATION)
Qty: 18 G | Refills: 2 | Status: SHIPPED | OUTPATIENT
Start: 2023-10-03

## 2023-10-03 NOTE — TELEPHONE ENCOUNTER
Randa OSCAR pn [facility] Clinical Support Pool  Subject: Referral Request     Reason for referral request? Pt is calling in requesting routine labs   prior to appointment on 10/16/23. Please advise. Provider patient wants to be referred to(if known):     Provider Phone Number(if known):      Additional Information for Provider?   ---------------------------------------------------------------------------   --------------   Sam Perry Bernard     [phone]; OK to leave message on voicemail   ---------------------------------------------------------------------------   --------------

## 2023-10-03 NOTE — TELEPHONE ENCOUNTER
Please Approve or Refuse.   Send to Pharmacy per Pt's Request:      Next Visit Date:  10/16/2023   Last Visit Date: 4/5/2023    Hemoglobin A1C (%)   Date Value   11/04/2022 5.6   07/07/2022 6.2 (H)   08/01/2019 5.6             ( goal A1C is < 7)   BP Readings from Last 3 Encounters:   07/18/23 126/68   06/21/23 130/78   06/09/23 129/87          (goal 120/80)  BUN   Date Value Ref Range Status   07/18/2023 9 6 - 20 mg/dL Final     Creatinine   Date Value Ref Range Status   07/18/2023 0.7 0.5 - 0.9 mg/dL Final     Potassium   Date Value Ref Range Status   07/18/2023 3.6 (L) 3.7 - 5.3 mmol/L Final

## 2023-10-16 ENCOUNTER — HOSPITAL ENCOUNTER (OUTPATIENT)
Age: 58
Discharge: HOME | End: 2023-10-16
Payer: COMMERCIAL

## 2023-10-16 ENCOUNTER — OFFICE VISIT (OUTPATIENT)
Dept: FAMILY MEDICINE CLINIC | Age: 58
End: 2023-10-16
Payer: COMMERCIAL

## 2023-10-16 VITALS
HEIGHT: 65 IN | TEMPERATURE: 97.4 F | BODY MASS INDEX: 23.82 KG/M2 | DIASTOLIC BLOOD PRESSURE: 64 MMHG | SYSTOLIC BLOOD PRESSURE: 112 MMHG | HEART RATE: 64 BPM | WEIGHT: 143 LBS | OXYGEN SATURATION: 96 %

## 2023-10-16 DIAGNOSIS — Z12.11 COLON CANCER SCREENING: ICD-10-CM

## 2023-10-16 DIAGNOSIS — R73.03 PREDIABETES: ICD-10-CM

## 2023-10-16 DIAGNOSIS — E27.8 ADRENAL NODULE (HCC): ICD-10-CM

## 2023-10-16 DIAGNOSIS — Z87.891 PERSONAL HISTORY OF TOBACCO USE: ICD-10-CM

## 2023-10-16 DIAGNOSIS — R09.81 SINUS CONGESTION: ICD-10-CM

## 2023-10-16 DIAGNOSIS — E78.2 MIXED HYPERLIPIDEMIA: ICD-10-CM

## 2023-10-16 DIAGNOSIS — J41.0 SIMPLE CHRONIC BRONCHITIS (HCC): Primary | ICD-10-CM

## 2023-10-16 DIAGNOSIS — E55.9 VITAMIN D DEFICIENCY: ICD-10-CM

## 2023-10-16 DIAGNOSIS — I10 PRIMARY HYPERTENSION: ICD-10-CM

## 2023-10-16 DIAGNOSIS — D45 POLYCYTHEMIA VERA (HCC): ICD-10-CM

## 2023-10-16 DIAGNOSIS — I48.11 LONGSTANDING PERSISTENT ATRIAL FIBRILLATION (HCC): ICD-10-CM

## 2023-10-16 PROBLEM — R63.4 WEIGHT LOSS: Status: RESOLVED | Noted: 2023-07-18 | Resolved: 2023-10-16

## 2023-10-16 PROBLEM — N10 ACUTE PYELONEPHRITIS: Status: RESOLVED | Noted: 2023-07-31 | Resolved: 2023-10-16

## 2023-10-16 LAB
25(OH)D3 SERPL-MCNC: 47.6 NG/ML (ref 30–100)
ALBUMIN SERPL-MCNC: 4.1 G/DL (ref 3.5–5.2)
ALP SERPL-CCNC: 125 U/L (ref 35–104)
ALT SERPL-CCNC: 10 U/L (ref 5–33)
ANION GAP SERPL CALCULATED.3IONS-SCNC: 11 MMOL/L (ref 9–17)
AST SERPL-CCNC: 25 U/L
BASOPHILS # BLD: 0.12 K/UL (ref 0–0.2)
BASOPHILS NFR BLD: 1 % (ref 0–2)
BILIRUB SERPL-MCNC: 0.3 MG/DL (ref 0.3–1.2)
BUN SERPL-MCNC: 12 MG/DL (ref 6–20)
CALCIUM SERPL-MCNC: 9.6 MG/DL (ref 8.6–10.4)
CHLORIDE SERPL-SCNC: 104 MMOL/L (ref 98–107)
CO2 SERPL-SCNC: 26 MMOL/L (ref 20–31)
CREAT SERPL-MCNC: 0.7 MG/DL (ref 0.5–0.9)
EOSINOPHIL # BLD: 0.36 K/UL (ref 0–0.4)
EOSINOPHILS RELATIVE PERCENT: 3 % (ref 0–4)
ERYTHROCYTE [DISTWIDTH] IN BLOOD BY AUTOMATED COUNT: 18.6 % (ref 11.5–14.9)
EST. AVERAGE GLUCOSE BLD GHB EST-MCNC: 120 MG/DL
FOLATE SERPL-MCNC: 7.8 NG/ML (ref 4.8–24.2)
GFR SERPL CREATININE-BSD FRML MDRD: >60 ML/MIN/1.73M2
GLUCOSE SERPL-MCNC: 118 MG/DL (ref 70–99)
HBA1C MFR BLD: 5.8 % (ref 4–6)
HCT VFR BLD AUTO: 45.2 % (ref 36–46)
HGB BLD-MCNC: 14.3 G/DL (ref 12–16)
LYMPHOCYTES NFR BLD: 3.45 K/UL (ref 1–4.8)
LYMPHOCYTES RELATIVE PERCENT: 29 % (ref 24–44)
MAGNESIUM SERPL-MCNC: 2.2 MG/DL (ref 1.6–2.6)
MCH RBC QN AUTO: 24.5 PG (ref 26–34)
MCHC RBC AUTO-ENTMCNC: 31.7 G/DL (ref 31–37)
MCV RBC AUTO: 77.2 FL (ref 80–100)
MONOCYTES NFR BLD: 0.83 K/UL (ref 0.1–1.3)
MONOCYTES NFR BLD: 7 % (ref 1–7)
MORPHOLOGY: ABNORMAL
NEUTROPHILS NFR BLD: 60 % (ref 36–66)
NEUTS SEG NFR BLD: 7.14 K/UL (ref 1.3–9.1)
PLATELET # BLD AUTO: 712 K/UL (ref 150–450)
PMV BLD AUTO: 8 FL (ref 6–12)
POTASSIUM SERPL-SCNC: 4.2 MMOL/L (ref 3.7–5.3)
PROT SERPL-MCNC: 7.3 G/DL (ref 6.4–8.3)
RBC # BLD AUTO: 5.85 M/UL (ref 4–5.2)
SODIUM SERPL-SCNC: 141 MMOL/L (ref 135–144)
TSH SERPL DL<=0.05 MIU/L-ACNC: 0.59 UIU/ML (ref 0.3–5)
URATE SERPL-MCNC: 4.6 MG/DL (ref 2.4–5.7)
VIT B12 SERPL-MCNC: 703 PG/ML (ref 232–1245)
WBC OTHER # BLD: 11.9 K/UL (ref 3.5–11)

## 2023-10-16 PROCEDURE — 82746 ASSAY OF FOLIC ACID SERUM: CPT

## 2023-10-16 PROCEDURE — G8484 FLU IMMUNIZE NO ADMIN: HCPCS | Performed by: FAMILY MEDICINE

## 2023-10-16 PROCEDURE — 84550 ASSAY OF BLOOD/URIC ACID: CPT

## 2023-10-16 PROCEDURE — G8420 CALC BMI NORM PARAMETERS: HCPCS | Performed by: FAMILY MEDICINE

## 2023-10-16 PROCEDURE — 83735 ASSAY OF MAGNESIUM: CPT

## 2023-10-16 PROCEDURE — 4004F PT TOBACCO SCREEN RCVD TLK: CPT | Performed by: FAMILY MEDICINE

## 2023-10-16 PROCEDURE — 83036 HEMOGLOBIN GLYCOSYLATED A1C: CPT

## 2023-10-16 PROCEDURE — 99214 OFFICE O/P EST MOD 30 MIN: CPT | Performed by: FAMILY MEDICINE

## 2023-10-16 PROCEDURE — 82607 VITAMIN B-12: CPT

## 2023-10-16 PROCEDURE — 3074F SYST BP LT 130 MM HG: CPT | Performed by: FAMILY MEDICINE

## 2023-10-16 PROCEDURE — 82306 VITAMIN D 25 HYDROXY: CPT

## 2023-10-16 PROCEDURE — 84443 ASSAY THYROID STIM HORMONE: CPT

## 2023-10-16 PROCEDURE — 3017F COLORECTAL CA SCREEN DOC REV: CPT | Performed by: FAMILY MEDICINE

## 2023-10-16 PROCEDURE — 36415 COLL VENOUS BLD VENIPUNCTURE: CPT

## 2023-10-16 PROCEDURE — 3023F SPIROM DOC REV: CPT | Performed by: FAMILY MEDICINE

## 2023-10-16 PROCEDURE — 3078F DIAST BP <80 MM HG: CPT | Performed by: FAMILY MEDICINE

## 2023-10-16 PROCEDURE — G8427 DOCREV CUR MEDS BY ELIG CLIN: HCPCS | Performed by: FAMILY MEDICINE

## 2023-10-16 PROCEDURE — 80053 COMPREHEN METABOLIC PANEL: CPT

## 2023-10-16 PROCEDURE — 85025 COMPLETE CBC W/AUTO DIFF WBC: CPT

## 2023-10-16 RX ORDER — FLUTICASONE PROPIONATE 50 MCG
1 SPRAY, SUSPENSION (ML) NASAL DAILY
Qty: 32 G | Refills: 1 | Status: SHIPPED | OUTPATIENT
Start: 2023-10-16

## 2023-10-16 ASSESSMENT — ENCOUNTER SYMPTOMS
WHEEZING: 0
SHORTNESS OF BREATH: 1
CONSTIPATION: 0
VOMITING: 0
ABDOMINAL DISTENTION: 0
EYE REDNESS: 0
RHINORRHEA: 0
COUGH: 0
BLOOD IN STOOL: 0
SORE THROAT: 0
SINUS PRESSURE: 0
TROUBLE SWALLOWING: 0
STRIDOR: 0
CHEST TIGHTNESS: 0
RECTAL PAIN: 0
COLOR CHANGE: 0
NAUSEA: 0
ABDOMINAL PAIN: 1
BACK PAIN: 1
DIARRHEA: 0

## 2023-10-16 NOTE — PROGRESS NOTES
Visit Information    Have you changed or started any medications since your last visit including any over-the-counter medicines, vitamins, or herbal medicines? no   Have you stopped taking any of your medications? Is so, why? -  no  Are you having any side effects from any of your medications? - no    Have you seen any other physician or provider since your last visit?  no   Have you had any other diagnostic tests since your last visit?  no   Have you been seen in the emergency room and/or had an admission in a hospital since we last saw you?  no   Have you had your routine dental cleaning in the past 6 months?  no     Do you have an active MyChart account? If no, what is the barrier?   Yes    Patient Care Team:  Lynda Lee MD as PCP - General (Family Medicine)  Lynda Lee MD as PCP - Empaneled Provider  John Hines MD as Surgeon (Colon and Rectal Surgery)    Medical History Review  Past Medical, Family, and Social History reviewed and does contribute to the patient presenting condition    Health Maintenance   Topic Date Due    Hepatitis B vaccine (1 of 3 - 3-dose series) Never done    COVID-19 Vaccine (1) Never done    Shingles vaccine (1 of 2) Never done    Low dose CT lung screening &/or counseling  Never done    Pneumococcal 0-64 years Vaccine (2 - PCV) 08/13/2019    Colorectal Cancer Screen  08/30/2019    Lipids  07/07/2023    Flu vaccine (1) Never done    A1C test (Diabetic or Prediabetic)  11/04/2023    Depression Monitoring  01/04/2024    Breast cancer screen  03/30/2024    Cervical cancer screen  01/04/2026    DTaP/Tdap/Td vaccine (2 - Td or Tdap) 10/14/2027    Hepatitis C screen  Completed    HIV screen  Completed    Hepatitis A vaccine  Aged Out    Hib vaccine  Aged Out    Meningococcal (ACWY) vaccine  Aged Out
Thought Content: Thought content normal.         Judgment: Judgment normal.             ASSESSMENT AND PLAN      1. Simple chronic bronchitis ([address])  Chronic problem fairly stable, discussed with patient to quit smoking. Continue inhalers. Continue aerosol treatments    2. Primary hypertension  Controlled continue beta-blockers. Advised low-salt diet  Advised home monitoring  3. Adrenal nodule (HCC)  Chronic problem stable    4. Prediabetes  Stable continue metformin  Advised low-carb low-fat diet    5. Longstanding persistent atrial fibrillation (HCC)  Heart rate is under control, encourage patient to follow-up with cardiologist, restart on Eliquis continue flecainide. - apixaban (ELIQUIS) 5 MG TABS tablet; Take 1 tablet by mouth 2 times daily  Dispense: 60 tablet; Refill: 3    6. Mixed hyperlipidemia  Continue statins recheck lipid panel results are pending    7. Polycythemia vera ([address])  Chronic problem worsening, discussed with patient fatigue could be due to elevated platelets, restart hydroxyurea follow-up with oncologist    8. Sinus congestion  Start on Flonase as needed  - fluticasone (FLONASE) 50 MCG/ACT nasal spray; 1 spray by Each Nostril route daily  Dispense: 32 g; Refill: 1    9. Personal history of tobacco use  Advised to do CT lung screening  - CT LUNG SCREENING; Future    10. Colon cancer screening    - POCT Fecal Immunochemical Test (FIT); Future      Orders Placed This Encounter   Procedures    CT LUNG SCREENING     Standing Status:   Future     Standing Expiration Date:   10/16/2024     Order Specific Question:   Is there documentation of shared decision making? Answer:   Yes     Order Specific Question:   Does the patient show any signs or symptoms of lung cancer? Answer:   No     Order Specific Question:   Is this the first (baseline) CT or an annual exam?     Answer: Annual [2]     Order Specific Question:   Is this a low dose CT or a routine CT?      Answer:   Low Dose CT [1]

## 2023-10-20 ENCOUNTER — TELEPHONE (OUTPATIENT)
Dept: ONCOLOGY | Age: 58
End: 2023-10-20

## 2023-10-20 ENCOUNTER — OFFICE VISIT (OUTPATIENT)
Dept: ONCOLOGY | Age: 58
End: 2023-10-20
Payer: COMMERCIAL

## 2023-10-20 VITALS
SYSTOLIC BLOOD PRESSURE: 144 MMHG | WEIGHT: 142 LBS | DIASTOLIC BLOOD PRESSURE: 76 MMHG | BODY MASS INDEX: 23.63 KG/M2 | HEART RATE: 75 BPM | TEMPERATURE: 96.9 F

## 2023-10-20 DIAGNOSIS — D45 POLYCYTHEMIA VERA (HCC): ICD-10-CM

## 2023-10-20 DIAGNOSIS — R63.4 WEIGHT LOSS: ICD-10-CM

## 2023-10-20 DIAGNOSIS — D47.3 ESSENTIAL THROMBOCYTHEMIA (HCC): ICD-10-CM

## 2023-10-20 DIAGNOSIS — I48.19 ATRIAL FIBRILLATION, PERSISTENT (HCC): Primary | ICD-10-CM

## 2023-10-20 PROCEDURE — 3078F DIAST BP <80 MM HG: CPT | Performed by: INTERNAL MEDICINE

## 2023-10-20 PROCEDURE — G8427 DOCREV CUR MEDS BY ELIG CLIN: HCPCS | Performed by: INTERNAL MEDICINE

## 2023-10-20 PROCEDURE — G8420 CALC BMI NORM PARAMETERS: HCPCS | Performed by: INTERNAL MEDICINE

## 2023-10-20 PROCEDURE — 3077F SYST BP >= 140 MM HG: CPT | Performed by: INTERNAL MEDICINE

## 2023-10-20 PROCEDURE — G8484 FLU IMMUNIZE NO ADMIN: HCPCS | Performed by: INTERNAL MEDICINE

## 2023-10-20 PROCEDURE — 3017F COLORECTAL CA SCREEN DOC REV: CPT | Performed by: INTERNAL MEDICINE

## 2023-10-20 PROCEDURE — 99214 OFFICE O/P EST MOD 30 MIN: CPT | Performed by: INTERNAL MEDICINE

## 2023-10-20 PROCEDURE — 4004F PT TOBACCO SCREEN RCVD TLK: CPT | Performed by: INTERNAL MEDICINE

## 2023-10-20 NOTE — TELEPHONE ENCOUNTER
Avs from 10/20/23    Resume phlebotomy  Rtc with repeated lab after phlebotomy          Labs ordered today    CBC with Auto Differential  Please complete by or around 10/27/2023  Comprehensive Metabolic Panel  Please complete by or around 10/27/2023  Icon To Do Follow Up Information               Return in about 2 weeks    (around 11/3/2023).       Phlebotomy on 10/31/23    Labs drawn week after that     Rv on 11/14/23    PT was given AVS and appt schedule    Electronically signed by Merlyn Beard on 10/20/2023 at 10:40 AM

## 2023-10-24 ENCOUNTER — TELEPHONE (OUTPATIENT)
Dept: ONCOLOGY | Age: 58
End: 2023-10-24

## 2023-11-01 ENCOUNTER — HOSPITAL ENCOUNTER (OUTPATIENT)
Dept: INFUSION THERAPY | Age: 58
Setting detail: INFUSION SERIES
Discharge: HOME | End: 2023-11-01
Payer: COMMERCIAL

## 2023-11-01 VITALS
DIASTOLIC BLOOD PRESSURE: 64 MMHG | RESPIRATION RATE: 16 BRPM | TEMPERATURE: 95.9 F | OXYGEN SATURATION: 92 % | SYSTOLIC BLOOD PRESSURE: 122 MMHG | HEART RATE: 54 BPM

## 2023-11-01 DIAGNOSIS — D45 POLYCYTHEMIA VERA (HCC): Primary | ICD-10-CM

## 2023-11-01 LAB
ALBUMIN SERPL-MCNC: 4 G/DL (ref 3.5–5.2)
ALP SERPL-CCNC: 103 U/L (ref 35–104)
ALT SERPL-CCNC: 14 U/L (ref 5–33)
ANION GAP SERPL CALCULATED.3IONS-SCNC: 9 MMOL/L (ref 9–17)
AST SERPL-CCNC: 20 U/L
BASOPHILS # BLD: 0 K/UL (ref 0–0.2)
BASOPHILS NFR BLD: 0 % (ref 0–2)
BILIRUB SERPL-MCNC: 0.5 MG/DL (ref 0.3–1.2)
BUN SERPL-MCNC: 8 MG/DL (ref 6–20)
CALCIUM SERPL-MCNC: 9.1 MG/DL (ref 8.6–10.4)
CHLORIDE SERPL-SCNC: 104 MMOL/L (ref 98–107)
CO2 SERPL-SCNC: 27 MMOL/L (ref 20–31)
CREAT SERPL-MCNC: 0.6 MG/DL (ref 0.5–0.9)
EOSINOPHIL # BLD: 0.26 K/UL (ref 0–0.4)
EOSINOPHILS RELATIVE PERCENT: 3 % (ref 0–4)
ERYTHROCYTE [DISTWIDTH] IN BLOOD BY AUTOMATED COUNT: 16.9 % (ref 11.5–14.9)
GFR SERPL CREATININE-BSD FRML MDRD: >60 ML/MIN/1.73M2
GLUCOSE SERPL-MCNC: 98 MG/DL (ref 70–99)
HCT VFR BLD AUTO: 43.3 % (ref 36–46)
HGB BLD-MCNC: 13.8 G/DL (ref 12–16)
LYMPHOCYTES NFR BLD: 2.29 K/UL (ref 1–4.8)
LYMPHOCYTES RELATIVE PERCENT: 26 % (ref 24–44)
MCH RBC QN AUTO: 24.6 PG (ref 26–34)
MCHC RBC AUTO-ENTMCNC: 31.9 G/DL (ref 31–37)
MCV RBC AUTO: 76.9 FL (ref 80–100)
MONOCYTES NFR BLD: 0.88 K/UL (ref 0.1–1.3)
MONOCYTES NFR BLD: 10 % (ref 1–7)
MORPHOLOGY: ABNORMAL
NEUTROPHILS NFR BLD: 61 % (ref 36–66)
NEUTS SEG NFR BLD: 5.37 K/UL (ref 1.3–9.1)
PLATELET # BLD AUTO: 601 K/UL (ref 150–450)
PMV BLD AUTO: 8 FL (ref 6–12)
POTASSIUM SERPL-SCNC: 3.8 MMOL/L (ref 3.7–5.3)
PROT SERPL-MCNC: 6.8 G/DL (ref 6.4–8.3)
RBC # BLD AUTO: 5.63 M/UL (ref 4–5.2)
SODIUM SERPL-SCNC: 140 MMOL/L (ref 135–144)
WBC OTHER # BLD: 8.8 K/UL (ref 3.5–11)

## 2023-11-01 PROCEDURE — 80053 COMPREHEN METABOLIC PANEL: CPT

## 2023-11-01 PROCEDURE — 85025 COMPLETE CBC W/AUTO DIFF WBC: CPT

## 2023-11-01 PROCEDURE — 36415 COLL VENOUS BLD VENIPUNCTURE: CPT

## 2023-11-01 PROCEDURE — 99195 PHLEBOTOMY: CPT

## 2023-11-01 NOTE — PROGRESS NOTES
Pt arrived for therapeutic phlebotomy. Vitals obtained and labs drawn. Hct = 43.3, therefore procedure indicated per written parameters. 400 mL whole blood removed using L arm. At that point, pt stated she felt very dizzy and was diaphoretic. Procedure ended. BP low. Pt given water to drink and feet elevated in recliner. Continued to monitor 30 minutes post procedure. No further s/s adverse reactions noted. Vitals returned to stable. Pt discharged home, ambulatory per self.

## 2023-11-10 DIAGNOSIS — J41.0 SIMPLE CHRONIC BRONCHITIS (HCC): ICD-10-CM

## 2023-11-10 DIAGNOSIS — I48.11 LONGSTANDING PERSISTENT ATRIAL FIBRILLATION (HCC): ICD-10-CM

## 2023-11-10 DIAGNOSIS — E55.9 VITAMIN D DEFICIENCY: ICD-10-CM

## 2023-11-10 DIAGNOSIS — Z87.891 PERSONAL HISTORY OF TOBACCO USE: ICD-10-CM

## 2023-11-13 RX ORDER — APIXABAN 5 MG/1
5 TABLET, FILM COATED ORAL 2 TIMES DAILY
Qty: 180 TABLET | Refills: 1 | Status: SHIPPED | OUTPATIENT
Start: 2023-11-13

## 2023-11-13 RX ORDER — ALBUTEROL SULFATE 90 UG/1
AEROSOL, METERED RESPIRATORY (INHALATION)
Qty: 54 G | Refills: 1 | Status: SHIPPED | OUTPATIENT
Start: 2023-11-13

## 2023-11-13 RX ORDER — ERGOCALCIFEROL 1.25 MG/1
50000 CAPSULE ORAL WEEKLY
Qty: 12 CAPSULE | Refills: 0 | Status: SHIPPED | OUTPATIENT
Start: 2023-11-13

## 2023-11-13 RX ORDER — NICOTINE 21 MG/24HR
PATCH, TRANSDERMAL 24 HOURS TRANSDERMAL
Qty: 84 PATCH | Refills: 1 | Status: SHIPPED | OUTPATIENT
Start: 2023-11-13

## 2023-11-13 NOTE — TELEPHONE ENCOUNTER
Please Approve or Refuse.   Send to Pharmacy per Pt's Request:      Next Visit Date:  1/16/2024   Last Visit Date: 10/16/2023    Hemoglobin A1C (%)   Date Value   10/16/2023 5.8   11/04/2022 5.6   07/07/2022 6.2 (H)             ( goal A1C is < 7)   BP Readings from Last 3 Encounters:   11/01/23 122/64   10/20/23 (!) 144/76   10/16/23 112/64          (goal 120/80)  BUN   Date Value Ref Range Status   11/01/2023 8 6 - 20 mg/dL Final     Creatinine   Date Value Ref Range Status   11/01/2023 0.6 0.5 - 0.9 mg/dL Final     Potassium   Date Value Ref Range Status   11/01/2023 3.8 3.7 - 5.3 mmol/L Final

## 2023-11-13 NOTE — TELEPHONE ENCOUNTER
Please Approve or Refuse.   Send to Pharmacy per Pt's Request:      Next Visit Date:  11/10/2023   Last Visit Date: 10/16/2023    Hemoglobin A1C (%)   Date Value   10/16/2023 5.8   11/04/2022 5.6   07/07/2022 6.2 (H)             ( goal A1C is < 7)   BP Readings from Last 3 Encounters:   11/01/23 122/64   10/20/23 (!) 144/76   10/16/23 112/64          (goal 120/80)  BUN   Date Value Ref Range Status   11/01/2023 8 6 - 20 mg/dL Final     Creatinine   Date Value Ref Range Status   11/01/2023 0.6 0.5 - 0.9 mg/dL Final     Potassium   Date Value Ref Range Status   11/01/2023 3.8 3.7 - 5.3 mmol/L Final

## 2023-11-14 ENCOUNTER — OFFICE VISIT (OUTPATIENT)
Dept: ONCOLOGY | Age: 58
End: 2023-11-14
Payer: COMMERCIAL

## 2023-11-14 ENCOUNTER — TELEPHONE (OUTPATIENT)
Dept: ONCOLOGY | Age: 58
End: 2023-11-14

## 2023-11-14 VITALS
HEART RATE: 66 BPM | SYSTOLIC BLOOD PRESSURE: 124 MMHG | TEMPERATURE: 97 F | DIASTOLIC BLOOD PRESSURE: 62 MMHG | BODY MASS INDEX: 23.3 KG/M2 | WEIGHT: 140 LBS

## 2023-11-14 DIAGNOSIS — Z15.89 JAK-2 GENE MUTATION: ICD-10-CM

## 2023-11-14 DIAGNOSIS — D45 POLYCYTHEMIA VERA (HCC): Primary | ICD-10-CM

## 2023-11-14 DIAGNOSIS — D47.3 ESSENTIAL THROMBOCYTHEMIA (HCC): ICD-10-CM

## 2023-11-14 PROCEDURE — G8427 DOCREV CUR MEDS BY ELIG CLIN: HCPCS | Performed by: INTERNAL MEDICINE

## 2023-11-14 PROCEDURE — G8420 CALC BMI NORM PARAMETERS: HCPCS | Performed by: INTERNAL MEDICINE

## 2023-11-14 PROCEDURE — 3078F DIAST BP <80 MM HG: CPT | Performed by: INTERNAL MEDICINE

## 2023-11-14 PROCEDURE — 3074F SYST BP LT 130 MM HG: CPT | Performed by: INTERNAL MEDICINE

## 2023-11-14 PROCEDURE — 4004F PT TOBACCO SCREEN RCVD TLK: CPT | Performed by: INTERNAL MEDICINE

## 2023-11-14 PROCEDURE — 99214 OFFICE O/P EST MOD 30 MIN: CPT | Performed by: INTERNAL MEDICINE

## 2023-11-14 PROCEDURE — 3017F COLORECTAL CA SCREEN DOC REV: CPT | Performed by: INTERNAL MEDICINE

## 2023-11-14 PROCEDURE — G8484 FLU IMMUNIZE NO ADMIN: HCPCS | Performed by: INTERNAL MEDICINE

## 2023-11-14 NOTE — TELEPHONE ENCOUNTER
Rtc in 6 weeks with labs              Obtain CBC<CMP    RV scheduled for 12/29/23@ 10 am   Labs 1 week prior (cbc,cmp)   PT was given AVS and appt schedule  Electronically signed by Nohemy Nunez MA on 11/14/2023 at 10:44 AM

## 2023-12-27 ENCOUNTER — HOSPITAL ENCOUNTER (OUTPATIENT)
Dept: INFUSION THERAPY | Age: 58
Setting detail: INFUSION SERIES
Discharge: HOME | End: 2023-12-27
Payer: COMMERCIAL

## 2023-12-27 VITALS
HEART RATE: 66 BPM | DIASTOLIC BLOOD PRESSURE: 78 MMHG | RESPIRATION RATE: 18 BRPM | SYSTOLIC BLOOD PRESSURE: 126 MMHG | OXYGEN SATURATION: 95 % | TEMPERATURE: 96.9 F

## 2023-12-27 DIAGNOSIS — D45 POLYCYTHEMIA VERA (HCC): Primary | ICD-10-CM

## 2023-12-27 LAB
ERYTHROCYTE [DISTWIDTH] IN BLOOD BY AUTOMATED COUNT: 16.5 % (ref 11.5–14.9)
HCT VFR BLD AUTO: 42.4 % (ref 36–46)
HGB BLD-MCNC: 13.5 G/DL (ref 12–16)
MCH RBC QN AUTO: 23 PG (ref 26–34)
MCHC RBC AUTO-ENTMCNC: 32 G/DL (ref 31–37)
MCV RBC AUTO: 72 FL (ref 80–100)
PLATELET # BLD AUTO: 667 K/UL (ref 150–450)
PMV BLD AUTO: 8 FL (ref 6–12)
RBC # BLD AUTO: 5.89 M/UL (ref 4–5.2)
WBC OTHER # BLD: 12.6 K/UL (ref 3.5–11)

## 2023-12-27 PROCEDURE — 85027 COMPLETE CBC AUTOMATED: CPT

## 2023-12-27 PROCEDURE — 36415 COLL VENOUS BLD VENIPUNCTURE: CPT

## 2023-12-27 PROCEDURE — 99195 PHLEBOTOMY: CPT

## 2023-12-27 NOTE — PROGRESS NOTES
Pt arrived ambulatory to outpatient infusion. Vitals completed. Labs drawn, HCT 42.4, per orders phlebotomy completed. 500 ml removed, Pt tolerated well. Vitals completed. Pt remained post infusion for apr 20 mins, no adverse reactions noted. Pt ambulatory at discharge.

## 2024-01-20 DIAGNOSIS — K58.1 IRRITABLE BOWEL SYNDROME WITH CONSTIPATION: ICD-10-CM

## 2024-01-22 ENCOUNTER — HOSPITAL ENCOUNTER (OUTPATIENT)
Age: 59
Discharge: HOME | End: 2024-01-22
Payer: COMMERCIAL

## 2024-01-22 DIAGNOSIS — D45 POLYCYTHEMIA VERA (HCC): ICD-10-CM

## 2024-01-22 LAB
ABSOLUTE BANDS: 0.11 K/UL (ref 0–1)
BANDS: 1 % (ref 0–10)
BASOPHILS # BLD: 0 K/UL (ref 0–0.2)
BASOPHILS NFR BLD: 0 % (ref 0–2)
EOSINOPHIL # BLD: 0.33 K/UL (ref 0–0.4)
EOSINOPHILS RELATIVE PERCENT: 3 % (ref 0–4)
ERYTHROCYTE [DISTWIDTH] IN BLOOD BY AUTOMATED COUNT: 17.1 % (ref 11.5–14.9)
HCT VFR BLD AUTO: 40.1 % (ref 36–46)
HGB BLD-MCNC: 12.6 G/DL (ref 12–16)
LYMPHOCYTES NFR BLD: 3.08 K/UL (ref 1–4.8)
LYMPHOCYTES RELATIVE PERCENT: 28 % (ref 24–44)
MCH RBC QN AUTO: 21.8 PG (ref 26–34)
MCHC RBC AUTO-ENTMCNC: 31.4 G/DL (ref 31–37)
MCV RBC AUTO: 69.4 FL (ref 80–100)
MONOCYTES NFR BLD: 0.55 K/UL (ref 0.1–1.3)
MONOCYTES NFR BLD: 5 % (ref 1–7)
MORPHOLOGY: ABNORMAL
NEUTROPHILS NFR BLD: 63 % (ref 36–66)
NEUTS SEG NFR BLD: 6.93 K/UL (ref 1.3–9.1)
PLATELET # BLD AUTO: 709 K/UL (ref 150–450)
PMV BLD AUTO: 7.7 FL (ref 6–12)
RBC # BLD AUTO: 5.78 M/UL (ref 4–5.2)
WBC OTHER # BLD: 11 K/UL (ref 3.5–11)

## 2024-01-22 PROCEDURE — 85025 COMPLETE CBC W/AUTO DIFF WBC: CPT

## 2024-01-22 PROCEDURE — 36415 COLL VENOUS BLD VENIPUNCTURE: CPT

## 2024-01-22 RX ORDER — FAMOTIDINE 20 MG/1
20 TABLET, FILM COATED ORAL 2 TIMES DAILY
Qty: 180 TABLET | Refills: 1 | Status: SHIPPED | OUTPATIENT
Start: 2024-01-22

## 2024-01-22 NOTE — TELEPHONE ENCOUNTER
Please Approve or Refuse.  Send to Pharmacy per Pt's Request: jose r     Next Visit Date:  2/7/2024   Last Visit Date: 10/16/2023    Hemoglobin A1C (%)   Date Value   10/16/2023 5.8   11/04/2022 5.6   07/07/2022 6.2 (H)             ( goal A1C is < 7)   BP Readings from Last 3 Encounters:   12/27/23 126/78   11/14/23 124/62   11/01/23 122/64          (goal 120/80)  BUN   Date Value Ref Range Status   11/01/2023 8 6 - 20 mg/dL Final     Creatinine   Date Value Ref Range Status   11/01/2023 0.6 0.5 - 0.9 mg/dL Final     Potassium   Date Value Ref Range Status   11/01/2023 3.8 3.7 - 5.3 mmol/L Final

## 2024-01-23 ENCOUNTER — TELEPHONE (OUTPATIENT)
Dept: ONCOLOGY | Age: 59
End: 2024-01-23

## 2024-01-23 ENCOUNTER — OFFICE VISIT (OUTPATIENT)
Dept: ONCOLOGY | Age: 59
End: 2024-01-23
Payer: COMMERCIAL

## 2024-01-23 ENCOUNTER — HOSPITAL ENCOUNTER (OUTPATIENT)
Age: 59
Discharge: HOME | End: 2024-01-23
Payer: COMMERCIAL

## 2024-01-23 VITALS
BODY MASS INDEX: 23.76 KG/M2 | DIASTOLIC BLOOD PRESSURE: 66 MMHG | WEIGHT: 142.8 LBS | HEART RATE: 79 BPM | SYSTOLIC BLOOD PRESSURE: 108 MMHG | TEMPERATURE: 97 F

## 2024-01-23 DIAGNOSIS — D50.0 IRON DEFICIENCY ANEMIA DUE TO CHRONIC BLOOD LOSS: ICD-10-CM

## 2024-01-23 DIAGNOSIS — D47.3 ESSENTIAL THROMBOCYTHEMIA (HCC): ICD-10-CM

## 2024-01-23 DIAGNOSIS — D47.1 MPN (MYELOPROLIFERATIVE NEOPLASM) (HCC): Primary | ICD-10-CM

## 2024-01-23 DIAGNOSIS — D47.1 MPN (MYELOPROLIFERATIVE NEOPLASM) (HCC): ICD-10-CM

## 2024-01-23 LAB
ALBUMIN SERPL-MCNC: 4.2 G/DL (ref 3.5–5.2)
ALP SERPL-CCNC: 121 U/L (ref 35–104)
ALT SERPL-CCNC: 12 U/L (ref 5–33)
ANION GAP SERPL CALCULATED.3IONS-SCNC: 14 MMOL/L (ref 9–17)
AST SERPL-CCNC: 20 U/L
BASOPHILS # BLD: 0.11 K/UL (ref 0–0.2)
BASOPHILS NFR BLD: 1 % (ref 0–2)
BILIRUB SERPL-MCNC: 0.3 MG/DL (ref 0.3–1.2)
BUN SERPL-MCNC: 13 MG/DL (ref 6–20)
CALCIUM SERPL-MCNC: 9.8 MG/DL (ref 8.6–10.4)
CHLORIDE SERPL-SCNC: 104 MMOL/L (ref 98–107)
CO2 SERPL-SCNC: 23 MMOL/L (ref 20–31)
CREAT SERPL-MCNC: 0.7 MG/DL (ref 0.5–0.9)
EOSINOPHIL # BLD: 0.34 K/UL (ref 0–0.4)
EOSINOPHILS RELATIVE PERCENT: 3 % (ref 0–4)
ERYTHROCYTE [DISTWIDTH] IN BLOOD BY AUTOMATED COUNT: 17 % (ref 11.5–14.9)
FERRITIN SERPL-MCNC: 7 NG/ML (ref 13–150)
GFR SERPL CREATININE-BSD FRML MDRD: >60 ML/MIN/1.73M2
GLUCOSE SERPL-MCNC: 102 MG/DL (ref 70–99)
HCT VFR BLD AUTO: 39.2 % (ref 36–46)
HGB BLD-MCNC: 12.7 G/DL (ref 12–16)
IRON SATN MFR SERPL: 6 % (ref 20–55)
IRON SERPL-MCNC: 25 UG/DL (ref 37–145)
LYMPHOCYTES NFR BLD: 2.91 K/UL (ref 1–4.8)
LYMPHOCYTES RELATIVE PERCENT: 26 % (ref 24–44)
MCH RBC QN AUTO: 22.5 PG (ref 26–34)
MCHC RBC AUTO-ENTMCNC: 32.3 G/DL (ref 31–37)
MCV RBC AUTO: 69.5 FL (ref 80–100)
MONOCYTES NFR BLD: 0.67 K/UL (ref 0.1–1.3)
MONOCYTES NFR BLD: 6 % (ref 1–7)
MORPHOLOGY: ABNORMAL
NEUTROPHILS NFR BLD: 64 % (ref 36–66)
NEUTS SEG NFR BLD: 7.17 K/UL (ref 1.3–9.1)
PLATELET # BLD AUTO: 723 K/UL (ref 150–450)
PMV BLD AUTO: 8.1 FL (ref 6–12)
POTASSIUM SERPL-SCNC: 4.5 MMOL/L (ref 3.7–5.3)
PROT SERPL-MCNC: 7.2 G/DL (ref 6.4–8.3)
RBC # BLD AUTO: 5.63 M/UL (ref 4–5.2)
SODIUM SERPL-SCNC: 141 MMOL/L (ref 135–144)
TIBC SERPL-MCNC: 389 UG/DL (ref 250–450)
UNSATURATED IRON BINDING CAPACITY: 364 UG/DL (ref 112–347)
WBC OTHER # BLD: 11.2 K/UL (ref 3.5–11)

## 2024-01-23 PROCEDURE — G8484 FLU IMMUNIZE NO ADMIN: HCPCS | Performed by: INTERNAL MEDICINE

## 2024-01-23 PROCEDURE — 3017F COLORECTAL CA SCREEN DOC REV: CPT | Performed by: INTERNAL MEDICINE

## 2024-01-23 PROCEDURE — 82728 ASSAY OF FERRITIN: CPT

## 2024-01-23 PROCEDURE — 36415 COLL VENOUS BLD VENIPUNCTURE: CPT

## 2024-01-23 PROCEDURE — G8420 CALC BMI NORM PARAMETERS: HCPCS | Performed by: INTERNAL MEDICINE

## 2024-01-23 PROCEDURE — 85025 COMPLETE CBC W/AUTO DIFF WBC: CPT

## 2024-01-23 PROCEDURE — 83540 ASSAY OF IRON: CPT

## 2024-01-23 PROCEDURE — 80053 COMPREHEN METABOLIC PANEL: CPT

## 2024-01-23 PROCEDURE — 83550 IRON BINDING TEST: CPT

## 2024-01-23 PROCEDURE — G8427 DOCREV CUR MEDS BY ELIG CLIN: HCPCS | Performed by: INTERNAL MEDICINE

## 2024-01-23 PROCEDURE — 3078F DIAST BP <80 MM HG: CPT | Performed by: INTERNAL MEDICINE

## 2024-01-23 PROCEDURE — 99214 OFFICE O/P EST MOD 30 MIN: CPT | Performed by: INTERNAL MEDICINE

## 2024-01-23 PROCEDURE — 3074F SYST BP LT 130 MM HG: CPT | Performed by: INTERNAL MEDICINE

## 2024-01-23 PROCEDURE — 4004F PT TOBACCO SCREEN RCVD TLK: CPT | Performed by: INTERNAL MEDICINE

## 2024-01-23 RX ORDER — HYDROXYUREA 500 MG/1
500 CAPSULE ORAL DAILY
Qty: 30 CAPSULE | Refills: 3 | Status: SHIPPED | OUTPATIENT
Start: 2024-01-23

## 2024-01-23 NOTE — TELEPHONE ENCOUNTER
AVS 01/23/24      Rtc in 4 weeks with labs        Labs ordered today  Ferritin  Please complete by 1/23/2024  Iron and TIBC  Please complete by 1/23/2024  CBC with Auto Differential  Please complete by or around 1/30/2024  Comprehensive Metabolic Panel  Please complete by or around 1/30/2024    Return in about 4 weeks  (around 2/20/2024).    RV 02/27/24    Labs to be drawn today    PT was given AVS and appt schedule    Electronically signed by Junie Boateng on 1/23/2024 at 12:03 PM

## 2024-01-23 NOTE — PROGRESS NOTES
benign lipid rich adenomas.  See full recommendations below.         ASSESSMENT:       Diagnosis Orders   1. MPN (myeloproliferative neoplasm) (HCC)  CBC with Auto Differential    Comprehensive Metabolic Panel      2. Essential thrombocythemia (HCC)  CBC with Auto Differential    Comprehensive Metabolic Panel      3. Iron deficiency anemia due to chronic blood loss  Iron and TIBC    Ferritin    CBC with Auto Differential    Comprehensive Metabolic Panel                         MPN with positive JAK2 mutation(PV/ET)  History of A. fib on Eliquis  Diabetes and hypertension  Active smoking  Developing iron deficiency anemia  Unintentional weight loss    PLAN:     I reviewed the labs available to me,outside records and discussed with the patient. I explained to the patient the nature of this hematologic problem. I explained the significance of these abnormalities and possible etiology and management options   Patient had JAK2 mutation positive MPN compatible with polycythemia vera with high platelet;it is difficult to differentiate if this is purely PV versus ET however under category as JAK2 mutation MPN and the treatment in this case would be phlebotomy to keep hematocrit below 42,this is considered low risk PV/ET with her age below 60 and absent of thrombotic event, initially recommend continuing aspirin low-dose 81 mg, control risk factors of CAD and no indication for Hydrea however with rapidly rising platelet> will start on Hydrea   Still losing weight will monitor that patient said she is not eating may do chest abdomen pelvis  Continue as needed phlebotomy  RTC in 4 weeks        Thank you for the consult                                        Juana Wayne MD                          [facility]                            This note is created with the assistance of a speech recognition program.  While intending to generate a document that actually reflects the content of the visit, the document

## 2024-02-07 ENCOUNTER — OFFICE VISIT (OUTPATIENT)
Dept: FAMILY MEDICINE CLINIC | Age: 59
End: 2024-02-07
Payer: COMMERCIAL

## 2024-02-07 VITALS
OXYGEN SATURATION: 98 % | SYSTOLIC BLOOD PRESSURE: 130 MMHG | DIASTOLIC BLOOD PRESSURE: 80 MMHG | WEIGHT: 141 LBS | HEART RATE: 76 BPM | BODY MASS INDEX: 23.49 KG/M2 | HEIGHT: 65 IN

## 2024-02-07 DIAGNOSIS — Z15.89 JAK-2 GENE MUTATION: ICD-10-CM

## 2024-02-07 DIAGNOSIS — J40 BRONCHITIS: Primary | ICD-10-CM

## 2024-02-07 DIAGNOSIS — I10 PRIMARY HYPERTENSION: ICD-10-CM

## 2024-02-07 DIAGNOSIS — I48.19 ATRIAL FIBRILLATION, PERSISTENT (HCC): ICD-10-CM

## 2024-02-07 DIAGNOSIS — Z12.11 COLON CANCER SCREENING: ICD-10-CM

## 2024-02-07 DIAGNOSIS — R73.03 PREDIABETES: ICD-10-CM

## 2024-02-07 DIAGNOSIS — E04.1 THYROID NODULE: ICD-10-CM

## 2024-02-07 DIAGNOSIS — Z11.59 ENCOUNTER FOR SCREENING FOR OTHER VIRAL DISEASES: ICD-10-CM

## 2024-02-07 DIAGNOSIS — E78.2 MIXED HYPERLIPIDEMIA: ICD-10-CM

## 2024-02-07 DIAGNOSIS — N18.1 CKD (CHRONIC KIDNEY DISEASE) STAGE 1, GFR 90 ML/MIN OR GREATER: ICD-10-CM

## 2024-02-07 PROBLEM — M25.40 PAINFUL SWELLING OF JOINT: Status: RESOLVED | Noted: 2023-04-05 | Resolved: 2024-02-07

## 2024-02-07 PROBLEM — M25.562 ACUTE PAIN OF LEFT KNEE: Status: RESOLVED | Noted: 2019-06-06 | Resolved: 2024-02-07

## 2024-02-07 PROBLEM — I48.91 ATRIAL FIBRILLATION WITH RVR (HCC): Status: RESOLVED | Noted: 2021-10-08 | Resolved: 2024-02-07

## 2024-02-07 PROBLEM — R09.81 SINUS CONGESTION: Status: RESOLVED | Noted: 2023-10-16 | Resolved: 2024-02-07

## 2024-02-07 PROBLEM — R15.9 INCONTINENCE OF FECES: Status: RESOLVED | Noted: 2020-10-13 | Resolved: 2024-02-07

## 2024-02-07 PROBLEM — M79.605 PAIN OF LEFT LOWER EXTREMITY: Status: RESOLVED | Noted: 2020-03-13 | Resolved: 2024-02-07

## 2024-02-07 PROBLEM — R63.4 WEIGHT LOSS: Status: RESOLVED | Noted: 2023-10-20 | Resolved: 2024-02-07

## 2024-02-07 PROBLEM — M79.89 SWELLING OF LEFT HAND: Status: RESOLVED | Noted: 2023-04-05 | Resolved: 2024-02-07

## 2024-02-07 PROCEDURE — G8427 DOCREV CUR MEDS BY ELIG CLIN: HCPCS | Performed by: FAMILY MEDICINE

## 2024-02-07 PROCEDURE — 3075F SYST BP GE 130 - 139MM HG: CPT | Performed by: FAMILY MEDICINE

## 2024-02-07 PROCEDURE — 3079F DIAST BP 80-89 MM HG: CPT | Performed by: FAMILY MEDICINE

## 2024-02-07 PROCEDURE — 99214 OFFICE O/P EST MOD 30 MIN: CPT | Performed by: FAMILY MEDICINE

## 2024-02-07 PROCEDURE — G8484 FLU IMMUNIZE NO ADMIN: HCPCS | Performed by: FAMILY MEDICINE

## 2024-02-07 PROCEDURE — 3017F COLORECTAL CA SCREEN DOC REV: CPT | Performed by: FAMILY MEDICINE

## 2024-02-07 PROCEDURE — G8420 CALC BMI NORM PARAMETERS: HCPCS | Performed by: FAMILY MEDICINE

## 2024-02-07 PROCEDURE — 4004F PT TOBACCO SCREEN RCVD TLK: CPT | Performed by: FAMILY MEDICINE

## 2024-02-07 RX ORDER — AZITHROMYCIN 250 MG/1
TABLET, FILM COATED ORAL
Qty: 6 TABLET | Refills: 0 | Status: SHIPPED | OUTPATIENT
Start: 2024-02-07 | End: 2024-02-12

## 2024-02-07 RX ORDER — GUAIFENESIN 600 MG/1
600 TABLET, EXTENDED RELEASE ORAL 2 TIMES DAILY
Qty: 30 TABLET | Refills: 0 | Status: SHIPPED | OUTPATIENT
Start: 2024-02-07

## 2024-02-07 SDOH — ECONOMIC STABILITY: FOOD INSECURITY: WITHIN THE PAST 12 MONTHS, THE FOOD YOU BOUGHT JUST DIDN'T LAST AND YOU DIDN'T HAVE MONEY TO GET MORE.: NEVER TRUE

## 2024-02-07 SDOH — ECONOMIC STABILITY: INCOME INSECURITY: HOW HARD IS IT FOR YOU TO PAY FOR THE VERY BASICS LIKE FOOD, HOUSING, MEDICAL CARE, AND HEATING?: NOT HARD AT ALL

## 2024-02-07 SDOH — ECONOMIC STABILITY: FOOD INSECURITY: WITHIN THE PAST 12 MONTHS, YOU WORRIED THAT YOUR FOOD WOULD RUN OUT BEFORE YOU GOT MONEY TO BUY MORE.: NEVER TRUE

## 2024-02-07 ASSESSMENT — ENCOUNTER SYMPTOMS
COLOR CHANGE: 0
ABDOMINAL DISTENTION: 0
DIARRHEA: 0
SINUS PRESSURE: 0
SHORTNESS OF BREATH: 0
BLOOD IN STOOL: 0
STRIDOR: 0
COUGH: 1
RHINORRHEA: 0
RECTAL PAIN: 0
BACK PAIN: 1
CHEST TIGHTNESS: 1
CONSTIPATION: 0
TROUBLE SWALLOWING: 0
VOMITING: 0
NAUSEA: 0
SORE THROAT: 0
EYE REDNESS: 0
WHEEZING: 0

## 2024-02-07 ASSESSMENT — PATIENT HEALTH QUESTIONNAIRE - PHQ9
SUM OF ALL RESPONSES TO PHQ QUESTIONS 1-9: 0
SUM OF ALL RESPONSES TO PHQ QUESTIONS 1-9: 0
2. FEELING DOWN, DEPRESSED OR HOPELESS: 0
10. IF YOU CHECKED OFF ANY PROBLEMS, HOW DIFFICULT HAVE THESE PROBLEMS MADE IT FOR YOU TO DO YOUR WORK, TAKE CARE OF THINGS AT HOME, OR GET ALONG WITH OTHER PEOPLE: 0
3. TROUBLE FALLING OR STAYING ASLEEP: 0
9. THOUGHTS THAT YOU WOULD BE BETTER OFF DEAD, OR OF HURTING YOURSELF: 0
8. MOVING OR SPEAKING SO SLOWLY THAT OTHER PEOPLE COULD HAVE NOTICED. OR THE OPPOSITE, BEING SO FIGETY OR RESTLESS THAT YOU HAVE BEEN MOVING AROUND A LOT MORE THAN USUAL: 0
6. FEELING BAD ABOUT YOURSELF - OR THAT YOU ARE A FAILURE OR HAVE LET YOURSELF OR YOUR FAMILY DOWN: 0
7. TROUBLE CONCENTRATING ON THINGS, SUCH AS READING THE NEWSPAPER OR WATCHING TELEVISION: 0
SUM OF ALL RESPONSES TO PHQ9 QUESTIONS 1 & 2: 0
SUM OF ALL RESPONSES TO PHQ QUESTIONS 1-9: 0
5. POOR APPETITE OR OVEREATING: 0
4. FEELING TIRED OR HAVING LITTLE ENERGY: 0
SUM OF ALL RESPONSES TO PHQ QUESTIONS 1-9: 0
1. LITTLE INTEREST OR PLEASURE IN DOING THINGS: 0

## 2024-02-07 NOTE — PROGRESS NOTES
Chief Complaint   Patient presents with    Hypertension    Congestion    Nasal Congestion     Runny nose     Cough    Other     Declines flu vaccine         April Wynnuld  here today for follow up on chronic medical problems, go over labs and/or diagnostic studies, and medication refills. Hypertension, Congestion, Nasal Congestion (Runny nose ), Cough, and Other (Declines flu vaccine)      HPI: Patient is scheduled for follow-up on chronic medical conditions and to discuss cough.      Patient reports she has been sick on and off since Chantel, she had cough which is persistent, with phlegm production runny nose nasal congestion.  Patient reports she did not try any medication except over-the-counter.  She has underlying COPD and gets flareups.  Patient is on multiple inhalers, denies any wheezing or shortness of breath.  Patient denies any chest pain or fever.    Patient has quit smoking 3 years before.      CKD stage first, improving, patient is not any medications which can decrease kidney function.  Patient is on Eliquis for atrial fibrillation.  Creatinine is back to normal.      Prediabetes on diet control and metformin.  Patient reports compliance with medication denies any side effects.    Thyroid nodule evaluated stable, continue to monitor.  Patient is up-to-date on blood work.      Hypertension controlled, patient is currently on metoprolol 25 mg reports compliance denies any side effects.  Patient denies any chest pain shortness of breath dyspnea on exertion.      Hyperlipidemia stable on statins, patient is up-to-date on blood work.  Patient is also on aspirin.      Patient has JAK2 mutation positive, follows with oncologist is on hydroxyurea.    Patient is also due for colon cancer screening, has fit test done in the past, reports she needs to have colonoscopy done due to low iron levels.      Patient also has CT lung and mammogram ordered which she has missed appointments, both tests reprinted and

## 2024-02-07 NOTE — PATIENT INSTRUCTIONS
Dear valued patient,    We hope this message finds you in good health. At [], we are committed to providing you with the best possible healthcare experience. To further enhance your convenience and streamline your access to our services, we would like to introduce you to the benefits of utilizing direct scheduling through the [facility] Patient Portal.    Direct scheduling is a feature within the [facility] Patient Portal that allows you to schedule appointments with your healthcare provider directly, without the need to make a phone call or wait for a callback. We understand that your time is marge, and we want to ensure that you have quick and easy access to our services whenever you need them.  Here are some reasons why you should consider utilizing direct scheduling through the [facility] Patient Portal:    1. Convenience: With direct scheduling, you can book appointments at any time that suits you best, 24 hours a day, 7 days a week. No more waiting on hold or playing phone tag with our office staff. It puts you in control of managing your healthcare appointments effortlessly.    2. Accessibility: The [facility] Patient Portal is accessible through your computer, smartphone, or tablet, allowing you to schedule appointments from the comfort of your home, office, or on the go. You can access your medical records, review test results, and communicate with your healthcare provider all in one secure and user-friendly platform.    3. Timesaving: By utilizing direct scheduling, you can avoid unnecessary delays and save marge time. You can easily browse the available appointment slots and select the one that works best for you, eliminating the back-and-forth communication typically required when scheduling via phone.    4. Reminders and notifications: [facility] Patient Portal sends automatic reminders for upcoming appointments, ensuring that you never miss an important visit. You can also receive notifications about test

## 2024-02-07 NOTE — PROGRESS NOTES
Visit Information    Have you changed or started any medications since your last visit including any over-the-counter medicines, vitamins, or herbal medicines? no   Are you having any side effects from any of your medications? -  no  Have you stopped taking any of your medications? Is so, why? -  no    Have you seen any other physician or provider since your last visit? No  Have you had any other diagnostic tests since your last visit? No  Have you been seen in the emergency room and/or had an admission to a hospital since we last saw you? No  Have you had your routine dental cleaning in the past 6 months? no    Have you activated your [employer] account? If not, what are your barriers? Yes     Patient Care Team:  Meche De La Torre MD as PCP - General (Family Medicine)  Meche De L aTorre MD as PCP - Empaneled Provider  Alejandra Meier MD as Surgeon (Colon and Rectal Surgery)    Medical History Review  Past Medical, Family, and Social History reviewed and does contribute to the patient presenting condition    Health Maintenance   Topic Date Due    Hepatitis B vaccine (1 of 3 - 3-dose series) Never done    COVID-19 Vaccine (1) Never done    Shingles vaccine (1 of 2) Never done    Low dose CT lung screening &/or counseling  Never done    Pneumococcal 0-64 years Vaccine (2 - PCV) 08/13/2019    Colorectal Cancer Screen  08/30/2019    Lipids  07/07/2023    Flu vaccine (1) Never done    Depression Monitoring  01/04/2024    Breast cancer screen  03/30/2024    A1C test (Diabetic or Prediabetic)  10/16/2024    Cervical cancer screen  01/04/2026    DTaP/Tdap/Td vaccine (2 - Td or Tdap) 10/14/2027    Hepatitis C screen  Completed    HIV screen  Completed    Hepatitis A vaccine  Aged Out    Hib vaccine  Aged Out    Polio vaccine  Aged Out    Meningococcal (ACWY) vaccine  Aged Out

## 2024-02-22 DIAGNOSIS — D75.839 THROMBOCYTHEMIA: Primary | ICD-10-CM

## 2024-02-22 DIAGNOSIS — D50.0 IRON DEFICIENCY ANEMIA DUE TO CHRONIC BLOOD LOSS: ICD-10-CM

## 2024-02-27 ENCOUNTER — OFFICE VISIT (OUTPATIENT)
Dept: ONCOLOGY | Age: 59
End: 2024-02-27
Payer: COMMERCIAL

## 2024-02-27 ENCOUNTER — TELEPHONE (OUTPATIENT)
Dept: ONCOLOGY | Age: 59
End: 2024-02-27

## 2024-02-27 ENCOUNTER — HOSPITAL ENCOUNTER (OUTPATIENT)
Age: 59
Discharge: HOME | End: 2024-02-27
Payer: COMMERCIAL

## 2024-02-27 VITALS
WEIGHT: 140 LBS | BODY MASS INDEX: 23.3 KG/M2 | TEMPERATURE: 97.6 F | SYSTOLIC BLOOD PRESSURE: 109 MMHG | DIASTOLIC BLOOD PRESSURE: 83 MMHG | HEART RATE: 88 BPM

## 2024-02-27 DIAGNOSIS — D47.1 MPN (MYELOPROLIFERATIVE NEOPLASM) (HCC): ICD-10-CM

## 2024-02-27 DIAGNOSIS — D47.3 ESSENTIAL THROMBOCYTHEMIA (HCC): ICD-10-CM

## 2024-02-27 DIAGNOSIS — Z11.59 ENCOUNTER FOR SCREENING FOR OTHER VIRAL DISEASES: ICD-10-CM

## 2024-02-27 DIAGNOSIS — D45 POLYCYTHEMIA VERA (HCC): Primary | ICD-10-CM

## 2024-02-27 DIAGNOSIS — D50.0 IRON DEFICIENCY ANEMIA DUE TO CHRONIC BLOOD LOSS: ICD-10-CM

## 2024-02-27 LAB
ALBUMIN SERPL-MCNC: 4.3 G/DL (ref 3.5–5.2)
ALP SERPL-CCNC: 113 U/L (ref 35–104)
ALT SERPL-CCNC: 18 U/L (ref 5–33)
ANION GAP SERPL CALCULATED.3IONS-SCNC: 10 MMOL/L (ref 9–17)
AST SERPL-CCNC: 23 U/L
BASOPHILS # BLD: 0.1 K/UL (ref 0–0.2)
BASOPHILS NFR BLD: 1 % (ref 0–2)
BILIRUB SERPL-MCNC: 0.2 MG/DL (ref 0.3–1.2)
BUN SERPL-MCNC: 13 MG/DL (ref 6–20)
CALCIUM SERPL-MCNC: 9.3 MG/DL (ref 8.6–10.4)
CHLORIDE SERPL-SCNC: 109 MMOL/L (ref 98–107)
CO2 SERPL-SCNC: 25 MMOL/L (ref 20–31)
CREAT SERPL-MCNC: 0.7 MG/DL (ref 0.5–0.9)
EOSINOPHIL # BLD: 0.19 K/UL (ref 0–0.4)
EOSINOPHILS RELATIVE PERCENT: 2 % (ref 0–4)
ERYTHROCYTE [DISTWIDTH] IN BLOOD BY AUTOMATED COUNT: 22.4 % (ref 11.5–14.9)
EST. AVERAGE GLUCOSE BLD GHB EST-MCNC: 114 MG/DL
FERRITIN SERPL-MCNC: 9 NG/ML (ref 13–150)
GFR SERPL CREATININE-BSD FRML MDRD: >60 ML/MIN/1.73M2
GLUCOSE SERPL-MCNC: 112 MG/DL (ref 70–99)
HBA1C MFR BLD: 5.6 % (ref 4–6)
HBV SURFACE AB SERPL IA-ACNC: <3.5 MIU/ML
HCT VFR BLD AUTO: 44 % (ref 36–46)
HGB BLD-MCNC: 13.8 G/DL (ref 12–16)
IRON SATN MFR SERPL: 7 % (ref 20–55)
IRON SERPL-MCNC: 29 UG/DL (ref 37–145)
LYMPHOCYTES NFR BLD: 2.95 K/UL (ref 1–4.8)
LYMPHOCYTES RELATIVE PERCENT: 31 % (ref 24–44)
MCH RBC QN AUTO: 22.3 PG (ref 26–34)
MCHC RBC AUTO-ENTMCNC: 31.4 G/DL (ref 31–37)
MCV RBC AUTO: 71 FL (ref 80–100)
MONOCYTES NFR BLD: 0.67 K/UL (ref 0.1–1.3)
MONOCYTES NFR BLD: 7 % (ref 1–7)
MORPHOLOGY: ABNORMAL
NEUTROPHILS NFR BLD: 59 % (ref 36–66)
NEUTS SEG NFR BLD: 5.59 K/UL (ref 1.3–9.1)
PLATELET # BLD AUTO: 631 K/UL (ref 150–450)
PMV BLD AUTO: 7.7 FL (ref 6–12)
POTASSIUM SERPL-SCNC: 4.3 MMOL/L (ref 3.7–5.3)
PROT SERPL-MCNC: 7.5 G/DL (ref 6.4–8.3)
RBC # BLD AUTO: 6.2 M/UL (ref 4–5.2)
SODIUM SERPL-SCNC: 144 MMOL/L (ref 135–144)
TIBC SERPL-MCNC: 417 UG/DL (ref 250–450)
UNSATURATED IRON BINDING CAPACITY: 388 UG/DL (ref 112–347)
WBC OTHER # BLD: 9.5 K/UL (ref 3.5–11)

## 2024-02-27 PROCEDURE — 80053 COMPREHEN METABOLIC PANEL: CPT

## 2024-02-27 PROCEDURE — 3017F COLORECTAL CA SCREEN DOC REV: CPT | Performed by: INTERNAL MEDICINE

## 2024-02-27 PROCEDURE — 82728 ASSAY OF FERRITIN: CPT

## 2024-02-27 PROCEDURE — G8427 DOCREV CUR MEDS BY ELIG CLIN: HCPCS | Performed by: INTERNAL MEDICINE

## 2024-02-27 PROCEDURE — 4004F PT TOBACCO SCREEN RCVD TLK: CPT | Performed by: INTERNAL MEDICINE

## 2024-02-27 PROCEDURE — 99214 OFFICE O/P EST MOD 30 MIN: CPT | Performed by: INTERNAL MEDICINE

## 2024-02-27 PROCEDURE — G8484 FLU IMMUNIZE NO ADMIN: HCPCS | Performed by: INTERNAL MEDICINE

## 2024-02-27 PROCEDURE — 83036 HEMOGLOBIN GLYCOSYLATED A1C: CPT

## 2024-02-27 PROCEDURE — 36415 COLL VENOUS BLD VENIPUNCTURE: CPT

## 2024-02-27 PROCEDURE — 83540 ASSAY OF IRON: CPT

## 2024-02-27 PROCEDURE — 83550 IRON BINDING TEST: CPT

## 2024-02-27 PROCEDURE — 99211 OFF/OP EST MAY X REQ PHY/QHP: CPT

## 2024-02-27 PROCEDURE — G8420 CALC BMI NORM PARAMETERS: HCPCS | Performed by: INTERNAL MEDICINE

## 2024-02-27 PROCEDURE — 3074F SYST BP LT 130 MM HG: CPT | Performed by: INTERNAL MEDICINE

## 2024-02-27 PROCEDURE — 86317 IMMUNOASSAY INFECTIOUS AGENT: CPT

## 2024-02-27 PROCEDURE — 85025 COMPLETE CBC W/AUTO DIFF WBC: CPT

## 2024-02-27 PROCEDURE — 3079F DIAST BP 80-89 MM HG: CPT | Performed by: INTERNAL MEDICINE

## 2024-02-27 RX ORDER — HYDROXYUREA 500 MG/1
1000 CAPSULE ORAL DAILY
Qty: 60 CAPSULE | Refills: 3 | Status: SHIPPED | OUTPATIENT
Start: 2024-02-27

## 2024-02-27 NOTE — TELEPHONE ENCOUNTER
AVS 02/27/24      Rtc in 4 weeks with labs      Labs ordered today  CBC with Auto Differential  Please complete by or around 3/5/2024  Comprehensive Metabolic Panel  Please complete by or around 3/5/2024       RV 04/2/24    Labs to be done week prior.    PT was given AVS and appt schedule    Electronically signed by Junie Boateng on 2/27/2024 at 11:23 AM

## 2024-02-27 NOTE — PROGRESS NOTES
Change phlebotomy change phlebotomy to every 4 weeks      Patient ID: April Ayers, 1965, [id number], 59 y.o.  Referred by :  No ref. provider found   Reason for consultation: JAK2 mutation positive MPN      HISTORY OF PRESENT ILLNESS:    Oncologic History:    April Ayers is a very pleasant 59 y.o. female was referred for high hemoglobin hematocrit and positive JAK2 mutation also had platelet elevated at 601 WBC fluctuating up and down, reviewing labs for the last 3 years she had high hematocrit since at least 2019 with rising platelet count and fluctuating neutrophilia, patient denied history of stroke or thrombosis or myocardial infarction,she had diabetes and she is on nitroglycerin, she does have history of V. tach and stress test was negative and on Eliquis for A. Fib  Bone marrow biopsy and aspirate was done and there was increased megakaryocyte with hyperlobulated  review the bone marrow biopsy with the pathologist and its difficult to tell if its polycythemia vera versus essential thrombocythemia however it came under category JAK2 positive MPN  Patient did not show up for 3 months due to insurance changes  Not on Hydrea and platelet up to 726 and hematocrit above 45    Hematology treatment  As needed phlebotomy every 4 weeks  Hydrea started on July 1, 2023 500 mg p.o. daily> discontinue on July 18, 2023  With a rising platelet started Hydrea back on February 2024    Interim history  Patient presents to the clinic for a follow-up visit and to discuss results of lab work-up and other relevant clinical data.  Overall patient has been tolerating treatment without unexpected or severe side effects.    During this visit patient's allergy, social, medical, surgical history and medications were reviewed and updated.     Platelet keep rising and started on Hydrea platelet trending down still 620 and hematocrit at 44  Without nausea          Past Medical History:   Diagnosis Date    Chronic kidney

## 2024-03-06 ENCOUNTER — NURSE ONLY (OUTPATIENT)
Dept: FAMILY MEDICINE CLINIC | Age: 59
End: 2024-03-06
Payer: COMMERCIAL

## 2024-03-06 DIAGNOSIS — Z23 NEED FOR HEPATITIS B VACCINATION: Primary | ICD-10-CM

## 2024-03-06 PROCEDURE — 90746 HEPB VACCINE 3 DOSE ADULT IM: CPT | Performed by: FAMILY MEDICINE

## 2024-03-06 PROCEDURE — 90471 IMMUNIZATION ADMIN: CPT | Performed by: FAMILY MEDICINE

## 2024-03-12 DIAGNOSIS — E78.2 MIXED HYPERLIPIDEMIA: ICD-10-CM

## 2024-03-12 RX ORDER — ATORVASTATIN CALCIUM 40 MG/1
40 TABLET, FILM COATED ORAL DAILY
Qty: 90 TABLET | Refills: 1 | Status: SHIPPED | OUTPATIENT
Start: 2024-03-12

## 2024-03-12 NOTE — TELEPHONE ENCOUNTER
Please Approve or Refuse.  Send to Pharmacy per Pt's Request: jose r      Next Visit Date:  5/6/2024   Last Visit Date: 2/7/2024    Hemoglobin A1C (%)   Date Value   02/27/2024 5.6   10/16/2023 5.8   11/04/2022 5.6             ( goal A1C is < 7)   BP Readings from Last 3 Encounters:   02/27/24 109/83   02/07/24 130/80   01/23/24 108/66          (goal 120/80)  BUN   Date Value Ref Range Status   02/27/2024 13 6 - 20 mg/dL Final     Creatinine   Date Value Ref Range Status   02/27/2024 0.7 0.5 - 0.9 mg/dL Final     Potassium   Date Value Ref Range Status   02/27/2024 4.3 3.7 - 5.3 mmol/L Final

## 2024-03-14 ENCOUNTER — TELEPHONE (OUTPATIENT)
Dept: GASTROENTEROLOGY | Age: 59
End: 2024-03-14

## 2024-03-14 NOTE — TELEPHONE ENCOUNTER
Called to schedule patient for colon from workqueue no answer lft vm to call office. Also mailed letter to home as a second attempt.PENG

## 2024-03-30 DIAGNOSIS — J41.0 SIMPLE CHRONIC BRONCHITIS (HCC): ICD-10-CM

## 2024-04-01 RX ORDER — TIOTROPIUM BROMIDE INHALATION SPRAY 1.56 UG/1
SPRAY, METERED RESPIRATORY (INHALATION)
Qty: 12 G | Refills: 3 | Status: SHIPPED | OUTPATIENT
Start: 2024-04-01

## 2024-04-05 ENCOUNTER — OFFICE VISIT (OUTPATIENT)
Dept: ONCOLOGY | Age: 59
End: 2024-04-05
Payer: COMMERCIAL

## 2024-04-05 ENCOUNTER — HOSPITAL ENCOUNTER (OUTPATIENT)
Age: 59
Discharge: HOME | End: 2024-04-05
Payer: COMMERCIAL

## 2024-04-05 ENCOUNTER — TELEPHONE (OUTPATIENT)
Dept: ONCOLOGY | Age: 59
End: 2024-04-05

## 2024-04-05 VITALS
SYSTOLIC BLOOD PRESSURE: 119 MMHG | HEART RATE: 70 BPM | BODY MASS INDEX: 22.63 KG/M2 | WEIGHT: 136 LBS | DIASTOLIC BLOOD PRESSURE: 70 MMHG | TEMPERATURE: 97.1 F

## 2024-04-05 DIAGNOSIS — D45 POLYCYTHEMIA VERA (HCC): ICD-10-CM

## 2024-04-05 DIAGNOSIS — D47.3 ESSENTIAL THROMBOCYTHEMIA (HCC): ICD-10-CM

## 2024-04-05 DIAGNOSIS — D47.1 MPN (MYELOPROLIFERATIVE NEOPLASM) (HCC): ICD-10-CM

## 2024-04-05 DIAGNOSIS — R63.4 WEIGHT LOSS: Primary | ICD-10-CM

## 2024-04-05 DIAGNOSIS — E27.8 ADRENAL NODULE (HCC): ICD-10-CM

## 2024-04-05 DIAGNOSIS — Z80.0 FAMILY HISTORY OF COLON CANCER: ICD-10-CM

## 2024-04-05 LAB
ALBUMIN SERPL-MCNC: 4 G/DL (ref 3.5–5.2)
ALP SERPL-CCNC: 96 U/L (ref 35–104)
ALT SERPL-CCNC: 17 U/L (ref 5–33)
ANION GAP SERPL CALCULATED.3IONS-SCNC: 14 MMOL/L (ref 9–17)
AST SERPL-CCNC: 19 U/L
BASOPHILS # BLD: 0 K/UL (ref 0–0.2)
BASOPHILS NFR BLD: 0 % (ref 0–2)
BILIRUB SERPL-MCNC: 0.3 MG/DL (ref 0.3–1.2)
BUN SERPL-MCNC: 13 MG/DL (ref 6–20)
CALCIUM SERPL-MCNC: 8.9 MG/DL (ref 8.6–10.4)
CHLORIDE SERPL-SCNC: 105 MMOL/L (ref 98–107)
CO2 SERPL-SCNC: 23 MMOL/L (ref 20–31)
CREAT SERPL-MCNC: 0.6 MG/DL (ref 0.5–0.9)
EOSINOPHIL # BLD: 0.16 K/UL (ref 0–0.4)
EOSINOPHILS RELATIVE PERCENT: 2 % (ref 0–4)
ERYTHROCYTE [DISTWIDTH] IN BLOOD BY AUTOMATED COUNT: 32.1 % (ref 11.5–14.9)
GFR SERPL CREATININE-BSD FRML MDRD: >90 ML/MIN/1.73M2
GLUCOSE SERPL-MCNC: 136 MG/DL (ref 70–99)
HCT VFR BLD AUTO: 43.1 % (ref 36–46)
HGB BLD-MCNC: 13.6 G/DL (ref 12–16)
LYMPHOCYTES NFR BLD: 3.82 K/UL (ref 1–4.8)
LYMPHOCYTES RELATIVE PERCENT: 49 % (ref 24–44)
MCH RBC QN AUTO: 24.7 PG (ref 26–34)
MCHC RBC AUTO-ENTMCNC: 31.7 G/DL (ref 31–37)
MCV RBC AUTO: 77.9 FL (ref 80–100)
MONOCYTES NFR BLD: 0.31 K/UL (ref 0.1–1.3)
MONOCYTES NFR BLD: 4 % (ref 1–7)
MORPHOLOGY: ABNORMAL
NEUTROPHILS NFR BLD: 45 % (ref 36–66)
NEUTS SEG NFR BLD: 3.51 K/UL (ref 1.3–9.1)
PLATELET # BLD AUTO: 207 K/UL (ref 150–450)
PMV BLD AUTO: 8.4 FL (ref 6–12)
POTASSIUM SERPL-SCNC: 3.5 MMOL/L (ref 3.7–5.3)
PROT SERPL-MCNC: 7 G/DL (ref 6.4–8.3)
RBC # BLD AUTO: 5.52 M/UL (ref 4–5.2)
SODIUM SERPL-SCNC: 142 MMOL/L (ref 135–144)
WBC OTHER # BLD: 7.8 K/UL (ref 3.5–11)

## 2024-04-05 PROCEDURE — 36415 COLL VENOUS BLD VENIPUNCTURE: CPT

## 2024-04-05 PROCEDURE — G8427 DOCREV CUR MEDS BY ELIG CLIN: HCPCS | Performed by: INTERNAL MEDICINE

## 2024-04-05 PROCEDURE — 99214 OFFICE O/P EST MOD 30 MIN: CPT | Performed by: INTERNAL MEDICINE

## 2024-04-05 PROCEDURE — 85025 COMPLETE CBC W/AUTO DIFF WBC: CPT

## 2024-04-05 PROCEDURE — 80053 COMPREHEN METABOLIC PANEL: CPT

## 2024-04-05 PROCEDURE — 4004F PT TOBACCO SCREEN RCVD TLK: CPT | Performed by: INTERNAL MEDICINE

## 2024-04-05 PROCEDURE — G8420 CALC BMI NORM PARAMETERS: HCPCS | Performed by: INTERNAL MEDICINE

## 2024-04-05 PROCEDURE — 3078F DIAST BP <80 MM HG: CPT | Performed by: INTERNAL MEDICINE

## 2024-04-05 PROCEDURE — 3074F SYST BP LT 130 MM HG: CPT | Performed by: INTERNAL MEDICINE

## 2024-04-05 PROCEDURE — 3017F COLORECTAL CA SCREEN DOC REV: CPT | Performed by: INTERNAL MEDICINE

## 2024-04-05 NOTE — TELEPHONE ENCOUNTER
AVS 04/05/24    Ct chest abdomen  Rtc after scan    CT Chest Abdomen Pelvis With Contrast       Pt will revisit after scan. Writer will keep an eye out and when its completed we will call pt and schedule a RV date.    Pt elected to access avs & schedule on [employer]    Electronically signed by Junie Boateng on 4/5/2024 at 12:06 PM

## 2024-04-05 NOTE — PROGRESS NOTES
file   Social Connections: Not on file   Intimate Partner Violence: Not on file   Housing Stability: Unknown (2/7/2024)    Housing Stability Vital Sign     Unable to Pay for Housing in the Last Year: Not on file     Number of Places Lived in the Last Year: Not on file     Unstable Housing in the Last Year: No       Family History   Problem Relation Age of Onset    Asthma Father     Cancer Father         colon    Diabetes Father     COPD Father     Heart Failure Father     Seizures Mother         REVIEW OF SYSTEM:     Constitutional: No fever or chills. No night sweats, no weight loss   Eyes: No eye discharge, double vision, or eye pain   HEENT: negative for sore mouth, sore throat, hoarseness and voice change   Respiratory: negative for cough , sputum, dyspnea, wheezing, hemoptysis, chest pain   Cardiovascular: negative for chest pain, dyspnea, palpitations, orthopnea, PND   Gastrointestinal: negative for nausea, vomiting, diarrhea, constipation, abdominal pain, Dysphagia, hematemesis and hematochezia   Genitourinary: negative for frequency, dysuria, nocturia, urinary incontinence, and hematuria   Integument: negative for rash, skin lesions, bruises.   Hematologic/Lymphatic: negative for easy bruising, bleeding, lymphadenopathy, petechiae and swelling/edema   Endocrine: negative for heat or cold intolerance, tremor, weight changes, change in bowel habits and hair loss   Musculoskeletal: negative for myalgias, arthralgias, pain, joint swelling,and bone pain   Neurological: negative for headaches, dizziness, seizures, weakness, numbness       OBJECTIVE:         Vitals:    04/05/24 1146   BP: 119/70   Pulse: 70   Temp: 97.1 °F (36.2 °C)           PHYSICAL EXAM:   General appearance - well appearing, no in pain or distress   Mental status - alert and cooperative   Eyes - pupils equal and reactive, extraocular eye movements intact   Ears - bilateral TM's and external ear canals normal   Mouth - mucous membranes moist,

## 2024-04-12 ENCOUNTER — TELEPHONE (OUTPATIENT)
Dept: ONCOLOGY | Age: 59
End: 2024-04-12

## 2024-04-12 NOTE — TELEPHONE ENCOUNTER
Pt needs f/u appt after Ct per 's AVS from 4/5 .   Writer was unable to Lvm pts Mailbox was full .  Office will cont to follow  Electronically signed by Yadi Bull MA on 4/12/2024 at 11:04 AM

## 2024-04-23 ENCOUNTER — HOSPITAL ENCOUNTER (OUTPATIENT)
Dept: CT IMAGING | Age: 59
Discharge: HOME | End: 2024-04-25
Attending: INTERNAL MEDICINE
Payer: COMMERCIAL

## 2024-04-23 ENCOUNTER — HOSPITAL ENCOUNTER (OUTPATIENT)
Dept: INFUSION THERAPY | Age: 59
Setting detail: INFUSION SERIES
Discharge: HOME | End: 2024-04-23
Payer: COMMERCIAL

## 2024-04-23 VITALS
OXYGEN SATURATION: 93 % | TEMPERATURE: 97.3 F | RESPIRATION RATE: 16 BRPM | SYSTOLIC BLOOD PRESSURE: 107 MMHG | DIASTOLIC BLOOD PRESSURE: 65 MMHG | HEART RATE: 63 BPM

## 2024-04-23 DIAGNOSIS — R63.4 WEIGHT LOSS: ICD-10-CM

## 2024-04-23 DIAGNOSIS — D45 POLYCYTHEMIA VERA (HCC): Primary | ICD-10-CM

## 2024-04-23 LAB
HCT VFR BLD AUTO: 42.1 % (ref 36–46)
HGB BLD-MCNC: 13.7 G/DL (ref 12–16)

## 2024-04-23 PROCEDURE — 2500000003 HC RX 250 WO HCPCS: Performed by: INTERNAL MEDICINE

## 2024-04-23 PROCEDURE — 6360000004 HC RX CONTRAST MEDICATION: Performed by: INTERNAL MEDICINE

## 2024-04-23 PROCEDURE — 2580000003 HC RX 258: Performed by: INTERNAL MEDICINE

## 2024-04-23 PROCEDURE — 85018 HEMOGLOBIN: CPT

## 2024-04-23 PROCEDURE — 74177 CT ABD & PELVIS W/CONTRAST: CPT

## 2024-04-23 PROCEDURE — 99195 PHLEBOTOMY: CPT

## 2024-04-23 PROCEDURE — 36415 COLL VENOUS BLD VENIPUNCTURE: CPT

## 2024-04-23 PROCEDURE — 85014 HEMATOCRIT: CPT

## 2024-04-23 PROCEDURE — 71260 CT THORAX DX C+: CPT

## 2024-04-23 RX ORDER — SODIUM CHLORIDE 0.9 % (FLUSH) 0.9 %
10 SYRINGE (ML) INJECTION PRN
Status: DISCONTINUED | OUTPATIENT
Start: 2024-04-23 | End: 2024-04-26 | Stop reason: HOSPADM

## 2024-04-23 RX ORDER — 0.9 % SODIUM CHLORIDE 0.9 %
100 INTRAVENOUS SOLUTION INTRAVENOUS ONCE
Status: COMPLETED | OUTPATIENT
Start: 2024-04-23 | End: 2024-04-23

## 2024-04-23 RX ADMIN — SODIUM CHLORIDE, PRESERVATIVE FREE 10 ML: 5 INJECTION INTRAVENOUS at 16:29

## 2024-04-23 RX ADMIN — BARIUM SULFATE 450 ML: 20 SUSPENSION ORAL at 16:29

## 2024-04-23 RX ADMIN — SODIUM CHLORIDE 100 ML: 9 INJECTION, SOLUTION INTRAVENOUS at 16:29

## 2024-04-23 RX ADMIN — IOPAMIDOL 100 ML: 755 INJECTION, SOLUTION INTRAVENOUS at 16:29

## 2024-04-23 NOTE — PROGRESS NOTES
Pt arrived for therapeutic phlebotomy.  Vitals obtained and labs drawn.  hematocrit = 42.1, therefore procedure indicated per written parameters.  500 mL whole blood removed using R arm.  Pt tolerated well.  Monitored post procedure.  No s/s adverse reactions noted.  Vitals remained stable.  Pt discharged home, ambulatory per self.

## 2024-05-06 ENCOUNTER — NURSE ONLY (OUTPATIENT)
Dept: FAMILY MEDICINE CLINIC | Age: 59
End: 2024-05-06
Payer: COMMERCIAL

## 2024-05-06 PROCEDURE — 90746 HEPB VACCINE 3 DOSE ADULT IM: CPT | Performed by: FAMILY MEDICINE

## 2024-05-06 PROCEDURE — 90471 IMMUNIZATION ADMIN: CPT | Performed by: FAMILY MEDICINE

## 2024-05-07 ENCOUNTER — TELEPHONE (OUTPATIENT)
Dept: ONCOLOGY | Age: 59
End: 2024-05-07

## 2024-05-07 ENCOUNTER — OFFICE VISIT (OUTPATIENT)
Dept: ONCOLOGY | Age: 59
End: 2024-05-07
Payer: COMMERCIAL

## 2024-05-07 VITALS
HEART RATE: 66 BPM | WEIGHT: 141 LBS | BODY MASS INDEX: 23.46 KG/M2 | TEMPERATURE: 96.8 F | SYSTOLIC BLOOD PRESSURE: 134 MMHG | DIASTOLIC BLOOD PRESSURE: 67 MMHG

## 2024-05-07 DIAGNOSIS — D47.3 ESSENTIAL THROMBOCYTHEMIA (HCC): ICD-10-CM

## 2024-05-07 DIAGNOSIS — D47.1 MPN (MYELOPROLIFERATIVE NEOPLASM) (HCC): ICD-10-CM

## 2024-05-07 DIAGNOSIS — D45 POLYCYTHEMIA VERA (HCC): ICD-10-CM

## 2024-05-07 DIAGNOSIS — Z15.89 JAK-2 GENE MUTATION: ICD-10-CM

## 2024-05-07 DIAGNOSIS — R63.4 WEIGHT LOSS: Primary | ICD-10-CM

## 2024-05-07 PROCEDURE — 3078F DIAST BP <80 MM HG: CPT | Performed by: INTERNAL MEDICINE

## 2024-05-07 PROCEDURE — 3017F COLORECTAL CA SCREEN DOC REV: CPT | Performed by: INTERNAL MEDICINE

## 2024-05-07 PROCEDURE — 4004F PT TOBACCO SCREEN RCVD TLK: CPT | Performed by: INTERNAL MEDICINE

## 2024-05-07 PROCEDURE — 99211 OFF/OP EST MAY X REQ PHY/QHP: CPT

## 2024-05-07 PROCEDURE — G8420 CALC BMI NORM PARAMETERS: HCPCS | Performed by: INTERNAL MEDICINE

## 2024-05-07 PROCEDURE — G8427 DOCREV CUR MEDS BY ELIG CLIN: HCPCS | Performed by: INTERNAL MEDICINE

## 2024-05-07 PROCEDURE — 99214 OFFICE O/P EST MOD 30 MIN: CPT | Performed by: INTERNAL MEDICINE

## 2024-05-07 PROCEDURE — 3075F SYST BP GE 130 - 139MM HG: CPT | Performed by: INTERNAL MEDICINE

## 2024-05-07 NOTE — PROGRESS NOTES
Change phlebotomy change phlebotomy to every 4 weeks      Patient ID: April Ayers, 1965, [id number], 59 y.o.  Referred by :  No ref. provider found   Reason for consultation: JAK2 mutation positive MPN    Problem list  JAK2 mutation positive MPN  Iron deficiency anemia  Family history of colon cancer  Weight loss      HISTORY OF PRESENT ILLNESS:    Oncologic History:    April Ayers is a very pleasant 59 y.o. female was referred for high hemoglobin hematocrit and positive JAK2 mutation also had platelet elevated at 601 WBC fluctuating up and down, reviewing labs for the last 3 years she had high hematocrit since at least 2019 with rising platelet count and fluctuating neutrophilia, patient denied history of stroke or thrombosis or myocardial infarction,she had diabetes and she is on nitroglycerin, she does have history of V. tach and stress test was negative and on Eliquis for A. Fib  Bone marrow biopsy and aspirate was done and there was increased megakaryocyte with hyperlobulated  review the bone marrow biopsy with the pathologist and its difficult to tell if its polycythemia vera versus essential thrombocythemia however it came under category JAK2 positive MPN  Patient did not show up for 3 months due to insurance changes  Not on Hydrea and platelet up to 726 and hematocrit above 45    Hematology treatment  As needed phlebotomy every 4 weeks  Hydrea started on July 1, 2023 500 mg p.o. daily> discontinue on July 18, 2023  With a rising platelet started Hydrea back on February 2024 dose escalated    Interim history  Patient presents to the clinic for a follow-up visit and to discuss results of lab work-up and other relevant clinical data.  Overall patient has been tolerating treatment without unexpected or severe side effects.    During this visit patient's allergy, social, medical, surgical history and medications were reviewed and updated.    Stable weight  Patient down to 226 on Hydrea thousand

## 2024-05-07 NOTE — TELEPHONE ENCOUNTER
AVS from 5/7/24    Rtc in 6 weeks with labs       Labs ordered today  CBC with Auto Differential  Please complete by or around 5/14/2024  Comprehensive Metabolic Panel  Please complete by or around 5/14/2024    Rv on 6/18/24    Labs drawn week prior     PT was given AVS and appt schedule    Electronically signed by Brenda Benoit on 5/7/2024 at 1:09 PM

## 2024-06-07 ENCOUNTER — OFFICE VISIT (OUTPATIENT)
Dept: FAMILY MEDICINE CLINIC | Age: 59
End: 2024-06-07
Payer: COMMERCIAL

## 2024-06-07 VITALS
SYSTOLIC BLOOD PRESSURE: 118 MMHG | WEIGHT: 135 LBS | OXYGEN SATURATION: 98 % | HEIGHT: 65 IN | DIASTOLIC BLOOD PRESSURE: 70 MMHG | BODY MASS INDEX: 22.49 KG/M2 | HEART RATE: 74 BPM

## 2024-06-07 DIAGNOSIS — Z15.89 JAK-2 GENE MUTATION: ICD-10-CM

## 2024-06-07 DIAGNOSIS — Z12.31 SCREENING MAMMOGRAM FOR HIGH-RISK PATIENT: ICD-10-CM

## 2024-06-07 DIAGNOSIS — I48.19 ATRIAL FIBRILLATION, PERSISTENT (HCC): Primary | ICD-10-CM

## 2024-06-07 DIAGNOSIS — E55.9 VITAMIN D DEFICIENCY: ICD-10-CM

## 2024-06-07 DIAGNOSIS — D45 POLYCYTHEMIA VERA (HCC): ICD-10-CM

## 2024-06-07 DIAGNOSIS — N18.1 CKD (CHRONIC KIDNEY DISEASE) STAGE 1, GFR 90 ML/MIN OR GREATER: ICD-10-CM

## 2024-06-07 DIAGNOSIS — E04.1 THYROID NODULE: ICD-10-CM

## 2024-06-07 DIAGNOSIS — E78.2 MIXED HYPERLIPIDEMIA: ICD-10-CM

## 2024-06-07 DIAGNOSIS — Z23 ENCOUNTER FOR IMMUNIZATION: ICD-10-CM

## 2024-06-07 DIAGNOSIS — E27.8 ADRENAL NODULE (HCC): ICD-10-CM

## 2024-06-07 DIAGNOSIS — I10 PRIMARY HYPERTENSION: ICD-10-CM

## 2024-06-07 PROBLEM — R63.4 WEIGHT LOSS: Status: RESOLVED | Noted: 2024-04-05 | Resolved: 2024-06-07

## 2024-06-07 PROCEDURE — 90471 IMMUNIZATION ADMIN: CPT | Performed by: FAMILY MEDICINE

## 2024-06-07 PROCEDURE — 4004F PT TOBACCO SCREEN RCVD TLK: CPT | Performed by: FAMILY MEDICINE

## 2024-06-07 PROCEDURE — 90746 HEPB VACCINE 3 DOSE ADULT IM: CPT | Performed by: FAMILY MEDICINE

## 2024-06-07 PROCEDURE — G8420 CALC BMI NORM PARAMETERS: HCPCS | Performed by: FAMILY MEDICINE

## 2024-06-07 PROCEDURE — 3017F COLORECTAL CA SCREEN DOC REV: CPT | Performed by: FAMILY MEDICINE

## 2024-06-07 PROCEDURE — 3074F SYST BP LT 130 MM HG: CPT | Performed by: FAMILY MEDICINE

## 2024-06-07 PROCEDURE — 99214 OFFICE O/P EST MOD 30 MIN: CPT | Performed by: FAMILY MEDICINE

## 2024-06-07 PROCEDURE — 3078F DIAST BP <80 MM HG: CPT | Performed by: FAMILY MEDICINE

## 2024-06-07 PROCEDURE — G8427 DOCREV CUR MEDS BY ELIG CLIN: HCPCS | Performed by: FAMILY MEDICINE

## 2024-06-07 RX ORDER — ERGOCALCIFEROL 1.25 MG/1
50000 CAPSULE ORAL WEEKLY
Qty: 12 CAPSULE | Refills: 0 | Status: SHIPPED | OUTPATIENT
Start: 2024-06-07

## 2024-06-07 SDOH — ECONOMIC STABILITY: FOOD INSECURITY: WITHIN THE PAST 12 MONTHS, THE FOOD YOU BOUGHT JUST DIDN'T LAST AND YOU DIDN'T HAVE MONEY TO GET MORE.: NEVER TRUE

## 2024-06-07 SDOH — ECONOMIC STABILITY: FOOD INSECURITY: WITHIN THE PAST 12 MONTHS, YOU WORRIED THAT YOUR FOOD WOULD RUN OUT BEFORE YOU GOT MONEY TO BUY MORE.: NEVER TRUE

## 2024-06-07 SDOH — ECONOMIC STABILITY: INCOME INSECURITY: HOW HARD IS IT FOR YOU TO PAY FOR THE VERY BASICS LIKE FOOD, HOUSING, MEDICAL CARE, AND HEATING?: NOT HARD AT ALL

## 2024-06-07 ASSESSMENT — ENCOUNTER SYMPTOMS
BLOOD IN STOOL: 0
RECTAL PAIN: 0
EYE REDNESS: 0
TROUBLE SWALLOWING: 0
SORE THROAT: 0
ABDOMINAL DISTENTION: 0
SHORTNESS OF BREATH: 0
WHEEZING: 0
COUGH: 0
DIARRHEA: 0
NAUSEA: 0
BACK PAIN: 0
COLOR CHANGE: 0
ABDOMINAL PAIN: 0
SINUS PRESSURE: 0
CONSTIPATION: 0
RHINORRHEA: 0
VOMITING: 0
STRIDOR: 0
CHEST TIGHTNESS: 0

## 2024-06-07 NOTE — PROGRESS NOTES
Visit Information    Have you changed or started any medications since your last visit including any over-the-counter medicines, vitamins, or herbal medicines? no   Are you having any side effects from any of your medications? -  no  Have you stopped taking any of your medications? Is so, why? -  no    Have you seen any other physician or provider since your last visit? No  Have you had any other diagnostic tests since your last visit? No  Have you been seen in the emergency room and/or had an admission to a hospital since we last saw you? No  Have you had your routine dental cleaning in the past 6 months? no    Have you activated your [employer] account? If not, what are your barriers? Yes     Patient Care Team:  Meche De La Torre MD as PCP - General (Family Medicine)  Meche De La Torre MD as PCP - Empaneled Provider  Alejandra Meier MD as Surgeon (Colon and Rectal Surgery)    Medical History Review  Past Medical, Family, and Social History reviewed and does contribute to the patient presenting condition    Health Maintenance   Topic Date Due    COVID-19 Vaccine (1) Never done    Shingles vaccine (1 of 2) Never done    Low dose CT lung screening &/or counseling  Never done    Pneumococcal 0-64 years Vaccine (2 of 2 - PCV) 08/13/2019    Colorectal Cancer Screen  08/30/2019    Lipids  07/07/2023    Breast cancer screen  03/30/2024    Flu vaccine (Season Ended) 08/01/2024    Hepatitis B vaccine (3 of 3 - 19+ 3-dose series) 09/06/2024    Depression Monitoring  02/07/2025    A1C test (Diabetic or Prediabetic)  02/27/2025    Cervical cancer screen  01/04/2026    DTaP/Tdap/Td vaccine (2 - Td or Tdap) 10/14/2027    Hepatitis C screen  Completed    HIV screen  Completed    Hepatitis A vaccine  Aged Out    Hib vaccine  Aged Out    Polio vaccine  Aged Out    Meningococcal (ACWY) vaccine  Aged Out     
reduction)          2/7/2024    12:08 PM 1/4/2023     2:30 PM 9/28/2022     1:25 PM 3/22/2022    11:58 AM 1/26/2022     9:20 AM 3/13/2020    12:10 PM 11/11/2019     8:19 AM   PHQ Scores   PHQ2 Score 0 1 1 1 0 0 0   PHQ9 Score 0 1 1 1 0 0 0     Interpretation of Total Score Depression Severity: 1-4 = Minimal depression, 5-9 = Mild depression, 10-14 = Moderate depression, 15-19 = Moderately severe depression, 20-27 = Severe depression    The patient'spast medical, surgical, social, and family history as well as her   current medications and allergies were reviewed as documented in today's encounter.      Medications, labs, diagnostic studies, consultations andfollow-up as documented in this encounter.    Return in about 3 months (around 9/7/2024) for 15 MIN ANGELITO, chronic conditions.    Patient wasseen with total face to face time of 35 minutes. More than 50% of this visit was counseling and education.       Future Appointments   Date Time Provider Department Center   6/12/2024 11:00 AM [facility] OP INFUSION CHAIR 03 [facility]   7/2/2024 11:45 AM Juana Wayne MD [facility]   8/7/2024 11:00 AM [facility] OP INFUSION CHAIR 03 [facility]   9/10/2024 12:30 PM Meche De La Torre MD [facility]   10/2/2024 11:00 AM [facility] OP INFUSION CHAIR 03 [facility]     This note was completed by using the assistance of a speech-recognition program. However, inadvertent computerized transcription errors may be present. Althoughevery effort was made to ensure accuracy, no guarantees can be provided that every mistake has been identified and corrected by editing.  Electronically signed by Meche De La Torre MD on 6/7/2024  1:37 PM

## 2024-06-11 DIAGNOSIS — R73.03 PREDIABETES: ICD-10-CM

## 2024-06-11 RX ORDER — METFORMIN HYDROCHLORIDE 500 MG/1
500 TABLET, EXTENDED RELEASE ORAL DAILY
Qty: 90 TABLET | Refills: 3 | Status: SHIPPED | OUTPATIENT
Start: 2024-06-11

## 2024-06-11 NOTE — TELEPHONE ENCOUNTER
Please Approve or Refuse.  Send to Pharmacy per Pt's Request:      Next Visit Date:  9/10/2024   Last Visit Date: 6/7/2024    Hemoglobin A1C (%)   Date Value   02/27/2024 5.6   10/16/2023 5.8   11/04/2022 5.6             ( goal A1C is < 7)   BP Readings from Last 3 Encounters:   06/07/24 118/70   05/07/24 134/67   04/23/24 107/65          (goal 120/80)  BUN   Date Value Ref Range Status   04/05/2024 13 6 - 20 mg/dL Final     Creatinine   Date Value Ref Range Status   04/05/2024 0.6 0.5 - 0.9 mg/dL Final     Potassium   Date Value Ref Range Status   04/05/2024 3.5 (L) 3.7 - 5.3 mmol/L Final

## 2024-06-19 ENCOUNTER — HOSPITAL ENCOUNTER (OUTPATIENT)
Dept: WOMENS IMAGING | Age: 59
Discharge: HOME | End: 2024-06-21
Payer: COMMERCIAL

## 2024-06-19 DIAGNOSIS — Z12.31 SCREENING MAMMOGRAM FOR HIGH-RISK PATIENT: ICD-10-CM

## 2024-06-19 DIAGNOSIS — E04.1 THYROID NODULE: ICD-10-CM

## 2024-06-19 PROCEDURE — 76536 US EXAM OF HEAD AND NECK: CPT

## 2024-06-19 PROCEDURE — 77063 BREAST TOMOSYNTHESIS BI: CPT

## 2024-06-21 PROBLEM — E04.2 MULTIPLE THYROID NODULES: Status: ACTIVE | Noted: 2024-06-21

## 2024-06-21 NOTE — RESULT ENCOUNTER NOTE
Please notify patient: Thyroid nodule 1.4 cm, decreased in size from prior, there are multiple additional nodules up to 6 mm.  Repeat ultrasound of the thyroid in 1 year      Future Appointments  7/2/2024   11:45 AM   Juana Wayne MD        [facility]           [facility]  8/7/2024   11:00 AM   [facility] OP INFUSION CHAIR 03  [facility]  9/10/2024  12:30 PM   Meche De La Torre MD         [facility]  10/2/2024  11:00 AM   [facility] OP INFUSION CHAIR 03  [facility]

## 2024-07-09 DIAGNOSIS — R09.81 SINUS CONGESTION: ICD-10-CM

## 2024-07-09 RX ORDER — FLUTICASONE PROPIONATE 50 MCG
1 SPRAY, SUSPENSION (ML) NASAL DAILY
Qty: 32 G | Refills: 1 | Status: SHIPPED | OUTPATIENT
Start: 2024-07-09

## 2024-07-09 NOTE — TELEPHONE ENCOUNTER
Please Approve or Refuse.  Send to Pharmacy per Pt's Request: jose r      Next Visit Date:  9/10/2024   Last Visit Date: 6/7/2024    Hemoglobin A1C (%)   Date Value   02/27/2024 5.6   10/16/2023 5.8   11/04/2022 5.6             ( goal A1C is < 7)   BP Readings from Last 3 Encounters:   06/07/24 118/70   05/07/24 134/67   04/23/24 107/65          (goal 120/80)  BUN   Date Value Ref Range Status   04/05/2024 13 6 - 20 mg/dL Final     Creatinine   Date Value Ref Range Status   04/05/2024 0.6 0.5 - 0.9 mg/dL Final     Potassium   Date Value Ref Range Status   04/05/2024 3.5 (L) 3.7 - 5.3 mmol/L Final

## 2024-08-02 ENCOUNTER — HOSPITAL ENCOUNTER (OUTPATIENT)
Age: 59
Discharge: HOME | End: 2024-08-02
Payer: COMMERCIAL

## 2024-08-02 ENCOUNTER — OFFICE VISIT (OUTPATIENT)
Dept: ONCOLOGY | Age: 59
End: 2024-08-02
Payer: COMMERCIAL

## 2024-08-02 ENCOUNTER — TELEPHONE (OUTPATIENT)
Dept: ONCOLOGY | Age: 59
End: 2024-08-02

## 2024-08-02 VITALS
HEART RATE: 73 BPM | BODY MASS INDEX: 21.97 KG/M2 | TEMPERATURE: 96.8 F | SYSTOLIC BLOOD PRESSURE: 147 MMHG | WEIGHT: 132 LBS | DIASTOLIC BLOOD PRESSURE: 76 MMHG

## 2024-08-02 DIAGNOSIS — D47.3 ESSENTIAL THROMBOCYTHEMIA (HCC): ICD-10-CM

## 2024-08-02 DIAGNOSIS — R73.03 PREDIABETES: ICD-10-CM

## 2024-08-02 DIAGNOSIS — D47.1 MPN (MYELOPROLIFERATIVE NEOPLASM) (HCC): ICD-10-CM

## 2024-08-02 DIAGNOSIS — Z15.89 JAK-2 GENE MUTATION: ICD-10-CM

## 2024-08-02 DIAGNOSIS — D45 POLYCYTHEMIA VERA (HCC): Primary | ICD-10-CM

## 2024-08-02 DIAGNOSIS — D45 POLYCYTHEMIA VERA (HCC): ICD-10-CM

## 2024-08-02 LAB
BASOPHILS # BLD: 0 K/UL (ref 0–0.2)
BASOPHILS NFR BLD: 0 % (ref 0–2)
EOSINOPHIL # BLD: 0.06 K/UL (ref 0–0.4)
EOSINOPHILS RELATIVE PERCENT: 1 % (ref 0–4)
ERYTHROCYTE [DISTWIDTH] IN BLOOD BY AUTOMATED COUNT: 14 % (ref 11.5–14.9)
EST. AVERAGE GLUCOSE BLD GHB EST-MCNC: 105 MG/DL
HBA1C MFR BLD: 5.3 % (ref 4–6)
HCT VFR BLD AUTO: 41.3 % (ref 36–46)
HGB BLD-MCNC: 14 G/DL (ref 12–16)
LYMPHOCYTES NFR BLD: 1.67 K/UL (ref 1–4.8)
LYMPHOCYTES RELATIVE PERCENT: 27 % (ref 24–44)
MCH RBC QN AUTO: 35.2 PG (ref 26–34)
MCHC RBC AUTO-ENTMCNC: 33.8 G/DL (ref 31–37)
MCV RBC AUTO: 104.1 FL (ref 80–100)
MONOCYTES NFR BLD: 0.31 K/UL (ref 0.1–1.3)
MONOCYTES NFR BLD: 5 % (ref 1–7)
MORPHOLOGY: NORMAL
NEUTROPHILS NFR BLD: 67 % (ref 36–66)
NEUTS SEG NFR BLD: 4.16 K/UL (ref 1.3–9.1)
PLATELET # BLD AUTO: 321 K/UL (ref 150–450)
PMV BLD AUTO: 7.4 FL (ref 6–12)
RBC # BLD AUTO: 3.97 M/UL (ref 4–5.2)
WBC OTHER # BLD: 6.2 K/UL (ref 3.5–11)

## 2024-08-02 PROCEDURE — 85025 COMPLETE CBC W/AUTO DIFF WBC: CPT

## 2024-08-02 PROCEDURE — 3077F SYST BP >= 140 MM HG: CPT | Performed by: INTERNAL MEDICINE

## 2024-08-02 PROCEDURE — 83036 HEMOGLOBIN GLYCOSYLATED A1C: CPT

## 2024-08-02 PROCEDURE — 4004F PT TOBACCO SCREEN RCVD TLK: CPT | Performed by: INTERNAL MEDICINE

## 2024-08-02 PROCEDURE — 3017F COLORECTAL CA SCREEN DOC REV: CPT | Performed by: INTERNAL MEDICINE

## 2024-08-02 PROCEDURE — 99214 OFFICE O/P EST MOD 30 MIN: CPT | Performed by: INTERNAL MEDICINE

## 2024-08-02 PROCEDURE — G8420 CALC BMI NORM PARAMETERS: HCPCS | Performed by: INTERNAL MEDICINE

## 2024-08-02 PROCEDURE — 36415 COLL VENOUS BLD VENIPUNCTURE: CPT

## 2024-08-02 PROCEDURE — 3078F DIAST BP <80 MM HG: CPT | Performed by: INTERNAL MEDICINE

## 2024-08-02 PROCEDURE — G8427 DOCREV CUR MEDS BY ELIG CLIN: HCPCS | Performed by: INTERNAL MEDICINE

## 2024-08-02 RX ORDER — HYDROXYUREA 500 MG/1
1000 CAPSULE ORAL DAILY
Qty: 60 CAPSULE | Refills: 3 | Status: SHIPPED | OUTPATIENT
Start: 2024-08-02

## 2024-08-02 NOTE — TELEPHONE ENCOUNTER
Rtc in 8 weeks with labs   RV scheduled for 10/8/24 @ 11:45   Labs 1 week prior (cbc,cmp)  PT was given AVS and appt schedule  Electronically signed by Yadi Bull MA on 8/2/2024 at 3:08 PM

## 2024-08-02 NOTE — PROGRESS NOTES
Change phlebotomy change phlebotomy to every 4 weeks      Patient ID: April Ayers, 1965, [id number], 59 y.o.  Referred by :  No ref. provider found   Reason for consultation: JAK2 mutation positive MPN    Problem list  JAK2 mutation positive MPN  Iron deficiency anemia  Family history of colon cancer  Weight loss      HISTORY OF PRESENT ILLNESS:    Oncologic History:    April Ayers is a very pleasant 59 y.o. female was referred for high hemoglobin hematocrit and positive JAK2 mutation also had platelet elevated at 601 WBC fluctuating up and down, reviewing labs for the last 3 years she had high hematocrit since at least 2019 with rising platelet count and fluctuating neutrophilia, patient denied history of stroke or thrombosis or myocardial infarction,she had diabetes and she is on nitroglycerin, she does have history of V. tach and stress test was negative and on Eliquis for A. Fib  Bone marrow biopsy and aspirate was done and there was increased megakaryocyte with hyperlobulated  review the bone marrow biopsy with the pathologist and its difficult to tell if its polycythemia vera versus essential thrombocythemia however it came under category JAK2 positive MPN  Patient did not show up for 3 months due to insurance changes  Not on Hydrea and platelet up to 726 and hematocrit above 45    Hematology treatment  As needed phlebotomy every 4 weeks  Hydrea started on July 1, 2023 500 mg p.o. daily> discontinue on July 18, 2023  With a rising platelet started Hydrea back on February 2024 dose escalated    Interim history  Patient presents to the clinic for a follow-up visit and to discuss results of lab work-up and other relevant clinical data.  Overall patient has been tolerating treatment without unexpected or severe side effects.    During this visit patient's allergy, social, medical, surgical history and medications were reviewed and updated.    Stable weight  Patient down to 226 on Hydrea thousand

## 2024-08-25 DIAGNOSIS — E55.9 VITAMIN D DEFICIENCY: ICD-10-CM

## 2024-08-25 NOTE — TELEPHONE ENCOUNTER
Please Approve or Refuse.  Send to Pharmacy per Pt's Request:      Next Visit Date:  9/10/2024   Last Visit Date: 6/7/2024    Hemoglobin A1C (%)   Date Value   08/02/2024 5.3   02/27/2024 5.6   10/16/2023 5.8             ( goal A1C is < 7)   BP Readings from Last 3 Encounters:   08/02/24 (!) 147/76   06/07/24 118/70   05/07/24 134/67          (goal 120/80)  BUN   Date Value Ref Range Status   04/05/2024 13 6 - 20 mg/dL Final     Creatinine   Date Value Ref Range Status   04/05/2024 0.6 0.5 - 0.9 mg/dL Final     Potassium   Date Value Ref Range Status   04/05/2024 3.5 (L) 3.7 - 5.3 mmol/L Final

## 2024-08-26 RX ORDER — ERGOCALCIFEROL 1.25 MG/1
50000 CAPSULE, LIQUID FILLED ORAL WEEKLY
Qty: 12 CAPSULE | Refills: 0 | Status: SHIPPED | OUTPATIENT
Start: 2024-08-26

## 2024-09-10 ENCOUNTER — OFFICE VISIT (OUTPATIENT)
Dept: FAMILY MEDICINE CLINIC | Age: 59
End: 2024-09-10
Payer: COMMERCIAL

## 2024-09-10 VITALS
HEIGHT: 65 IN | HEART RATE: 74 BPM | WEIGHT: 128 LBS | SYSTOLIC BLOOD PRESSURE: 120 MMHG | OXYGEN SATURATION: 98 % | BODY MASS INDEX: 21.33 KG/M2 | DIASTOLIC BLOOD PRESSURE: 70 MMHG

## 2024-09-10 DIAGNOSIS — Z15.89 JAK-2 GENE MUTATION: ICD-10-CM

## 2024-09-10 DIAGNOSIS — Z87.891 PERSONAL HISTORY OF TOBACCO USE: ICD-10-CM

## 2024-09-10 DIAGNOSIS — J41.0 SIMPLE CHRONIC BRONCHITIS (HCC): ICD-10-CM

## 2024-09-10 DIAGNOSIS — J40 BRONCHITIS: Primary | ICD-10-CM

## 2024-09-10 DIAGNOSIS — I10 PRIMARY HYPERTENSION: ICD-10-CM

## 2024-09-10 DIAGNOSIS — E04.1 THYROID NODULE: ICD-10-CM

## 2024-09-10 DIAGNOSIS — E78.2 MIXED HYPERLIPIDEMIA: ICD-10-CM

## 2024-09-10 DIAGNOSIS — I48.19 ATRIAL FIBRILLATION, PERSISTENT (HCC): ICD-10-CM

## 2024-09-10 PROBLEM — M25.362 INSTABILITY OF LEFT KNEE JOINT: Status: RESOLVED | Noted: 2020-03-13 | Resolved: 2024-09-10

## 2024-09-10 PROBLEM — K62.3 INCOMPLETE RECTAL PROLAPSE: Status: RESOLVED | Noted: 2018-09-17 | Resolved: 2024-09-10

## 2024-09-10 PROCEDURE — 4004F PT TOBACCO SCREEN RCVD TLK: CPT | Performed by: FAMILY MEDICINE

## 2024-09-10 PROCEDURE — G8420 CALC BMI NORM PARAMETERS: HCPCS | Performed by: FAMILY MEDICINE

## 2024-09-10 PROCEDURE — 3078F DIAST BP <80 MM HG: CPT | Performed by: FAMILY MEDICINE

## 2024-09-10 PROCEDURE — 99214 OFFICE O/P EST MOD 30 MIN: CPT | Performed by: FAMILY MEDICINE

## 2024-09-10 PROCEDURE — 3023F SPIROM DOC REV: CPT | Performed by: FAMILY MEDICINE

## 2024-09-10 PROCEDURE — 3017F COLORECTAL CA SCREEN DOC REV: CPT | Performed by: FAMILY MEDICINE

## 2024-09-10 PROCEDURE — 3074F SYST BP LT 130 MM HG: CPT | Performed by: FAMILY MEDICINE

## 2024-09-10 PROCEDURE — G8427 DOCREV CUR MEDS BY ELIG CLIN: HCPCS | Performed by: FAMILY MEDICINE

## 2024-09-10 RX ORDER — TIOTROPIUM BROMIDE INHALATION SPRAY 1.56 UG/1
SPRAY, METERED RESPIRATORY (INHALATION)
Qty: 12 G | Refills: 3 | Status: SHIPPED | OUTPATIENT
Start: 2024-09-10

## 2024-09-10 RX ORDER — AZITHROMYCIN 250 MG/1
TABLET, FILM COATED ORAL
Qty: 6 TABLET | Refills: 0 | Status: SHIPPED | OUTPATIENT
Start: 2024-09-10 | End: 2024-09-15

## 2024-09-10 RX ORDER — ALBUTEROL SULFATE 0.83 MG/ML
2.5 SOLUTION RESPIRATORY (INHALATION) EVERY 6 HOURS PRN
Qty: 125 EACH | Refills: 0 | Status: SHIPPED | OUTPATIENT
Start: 2024-09-10

## 2024-09-10 RX ORDER — GUAIFENESIN 600 MG/1
600 TABLET, EXTENDED RELEASE ORAL 2 TIMES DAILY
Qty: 30 TABLET | Refills: 0 | Status: SHIPPED | OUTPATIENT
Start: 2024-09-10

## 2024-09-10 RX ORDER — ALBUTEROL SULFATE 90 UG/1
AEROSOL, METERED RESPIRATORY (INHALATION)
Qty: 54 G | Refills: 1 | Status: SHIPPED | OUTPATIENT
Start: 2024-09-10

## 2024-09-10 ASSESSMENT — ENCOUNTER SYMPTOMS
SINUS PRESSURE: 0
WHEEZING: 0
BLOOD IN STOOL: 0
DIARRHEA: 0
VOMITING: 0
BACK PAIN: 1
CHEST TIGHTNESS: 1
ABDOMINAL PAIN: 0
TROUBLE SWALLOWING: 0
STRIDOR: 0
COLOR CHANGE: 0
COUGH: 1
SORE THROAT: 0
ABDOMINAL DISTENTION: 0
NAUSEA: 0
SHORTNESS OF BREATH: 0
CONSTIPATION: 0
EYE REDNESS: 0
RHINORRHEA: 1
RECTAL PAIN: 0

## 2024-09-11 ENCOUNTER — TELEPHONE (OUTPATIENT)
Dept: FAMILY MEDICINE CLINIC | Age: 59
End: 2024-09-11

## 2024-09-11 DIAGNOSIS — E78.2 MIXED HYPERLIPIDEMIA: ICD-10-CM

## 2024-09-11 DIAGNOSIS — J40 BRONCHITIS: ICD-10-CM

## 2024-09-11 DIAGNOSIS — J41.0 SIMPLE CHRONIC BRONCHITIS (HCC): ICD-10-CM

## 2024-09-11 DIAGNOSIS — I47.20 VENTRICULAR TACHYARRHYTHMIA (HCC): ICD-10-CM

## 2024-09-11 RX ORDER — METOPROLOL SUCCINATE 25 MG/1
25 TABLET, EXTENDED RELEASE ORAL DAILY
Qty: 90 TABLET | Refills: 3 | Status: SHIPPED | OUTPATIENT
Start: 2024-09-11

## 2024-09-11 RX ORDER — ATORVASTATIN CALCIUM 40 MG/1
40 TABLET, FILM COATED ORAL DAILY
Qty: 90 TABLET | Refills: 1 | Status: SHIPPED | OUTPATIENT
Start: 2024-09-11

## 2024-09-12 ENCOUNTER — HOSPITAL ENCOUNTER (OUTPATIENT)
Age: 59
Discharge: HOME | End: 2024-09-12
Payer: COMMERCIAL

## 2024-09-12 ENCOUNTER — TELEPHONE (OUTPATIENT)
Dept: FAMILY MEDICINE CLINIC | Age: 59
End: 2024-09-12

## 2024-09-12 DIAGNOSIS — R31.9 HEMATURIA, UNSPECIFIED TYPE: ICD-10-CM

## 2024-09-12 DIAGNOSIS — R31.9 HEMATURIA, UNSPECIFIED TYPE: Primary | ICD-10-CM

## 2024-09-12 LAB
BACTERIA URNS QL MICRO: ABNORMAL
BILIRUB UR QL STRIP: NEGATIVE
CASTS #/AREA URNS LPF: ABNORMAL /LPF
CLARITY UR: CLEAR
COLOR UR: YELLOW
EPI CELLS #/AREA URNS HPF: ABNORMAL /HPF
GLUCOSE UR STRIP-MCNC: NEGATIVE MG/DL
HGB UR QL STRIP.AUTO: ABNORMAL
KETONES UR STRIP-MCNC: NEGATIVE MG/DL
LEUKOCYTE ESTERASE UR QL STRIP: ABNORMAL
NITRITE UR QL STRIP: NEGATIVE
PH UR STRIP: 6.5 [PH] (ref 5–8)
PROT UR STRIP-MCNC: NEGATIVE MG/DL
RBC #/AREA URNS HPF: ABNORMAL /HPF
SP GR UR STRIP: 1 (ref 1–1.03)
UROBILINOGEN UR STRIP-ACNC: NORMAL EU/DL (ref 0–1)
WBC #/AREA URNS HPF: ABNORMAL /HPF

## 2024-09-12 PROCEDURE — 81001 URINALYSIS AUTO W/SCOPE: CPT

## 2024-09-12 PROCEDURE — 87086 URINE CULTURE/COLONY COUNT: CPT

## 2024-09-13 LAB
MICROORGANISM SPEC CULT: NORMAL
SPECIMEN DESCRIPTION: NORMAL

## 2024-09-17 ENCOUNTER — ENROLLMENT (OUTPATIENT)
Dept: CARE COORDINATION | Age: 59
End: 2024-09-17

## 2024-09-17 DIAGNOSIS — N93.9 VAGINAL BLEEDING: Primary | ICD-10-CM

## 2024-09-19 ENCOUNTER — HOSPITAL ENCOUNTER (OUTPATIENT)
Dept: ULTRASOUND IMAGING | Age: 59
Discharge: HOME | End: 2024-09-21
Payer: COMMERCIAL

## 2024-09-19 DIAGNOSIS — N93.9 VAGINAL BLEEDING: ICD-10-CM

## 2024-09-19 PROCEDURE — 76856 US EXAM PELVIC COMPLETE: CPT

## 2024-10-02 ENCOUNTER — HOSPITAL ENCOUNTER (OUTPATIENT)
Dept: INFUSION THERAPY | Age: 59
Setting detail: INFUSION SERIES
Discharge: HOME | End: 2024-10-02
Payer: COMMERCIAL

## 2024-10-02 VITALS
OXYGEN SATURATION: 95 % | HEART RATE: 77 BPM | RESPIRATION RATE: 18 BRPM | DIASTOLIC BLOOD PRESSURE: 79 MMHG | TEMPERATURE: 96 F | SYSTOLIC BLOOD PRESSURE: 121 MMHG

## 2024-10-02 DIAGNOSIS — D45 POLYCYTHEMIA VERA (HCC): ICD-10-CM

## 2024-10-02 LAB
ALBUMIN SERPL-MCNC: 4.3 G/DL (ref 3.5–5.2)
ALP SERPL-CCNC: 100 U/L (ref 35–104)
ALT SERPL-CCNC: 11 U/L (ref 5–33)
ANION GAP SERPL CALCULATED.3IONS-SCNC: 13 MMOL/L (ref 9–17)
AST SERPL-CCNC: 13 U/L
BILIRUB SERPL-MCNC: 0.3 MG/DL (ref 0.3–1.2)
BUN SERPL-MCNC: 15 MG/DL (ref 6–20)
CALCIUM SERPL-MCNC: 9.4 MG/DL (ref 8.6–10.4)
CHLORIDE SERPL-SCNC: 109 MMOL/L (ref 98–107)
CO2 SERPL-SCNC: 23 MMOL/L (ref 20–31)
CREAT SERPL-MCNC: 0.6 MG/DL (ref 0.5–0.9)
ERYTHROCYTE [DISTWIDTH] IN BLOOD BY AUTOMATED COUNT: 14.9 % (ref 11.5–14.9)
GFR, ESTIMATED: >90 ML/MIN/1.73M2
GLUCOSE SERPL-MCNC: 120 MG/DL (ref 70–99)
HCT VFR BLD AUTO: 42.8 % (ref 36–46)
HGB BLD-MCNC: 14.7 G/DL (ref 12–16)
MCH RBC QN AUTO: 35.4 PG (ref 26–34)
MCHC RBC AUTO-ENTMCNC: 34.4 G/DL (ref 31–37)
MCV RBC AUTO: 103.1 FL (ref 80–100)
PLATELET # BLD AUTO: 331 K/UL (ref 150–450)
PMV BLD AUTO: 7.1 FL (ref 6–12)
POTASSIUM SERPL-SCNC: 4 MMOL/L (ref 3.7–5.3)
PROT SERPL-MCNC: 7.5 G/DL (ref 6.4–8.3)
RBC # BLD AUTO: 4.15 M/UL (ref 4–5.2)
SODIUM SERPL-SCNC: 145 MMOL/L (ref 135–144)
WBC OTHER # BLD: 6.7 K/UL (ref 3.5–11)

## 2024-10-02 PROCEDURE — 85027 COMPLETE CBC AUTOMATED: CPT

## 2024-10-02 PROCEDURE — 36415 COLL VENOUS BLD VENIPUNCTURE: CPT

## 2024-10-02 PROCEDURE — 99195 PHLEBOTOMY: CPT

## 2024-10-02 PROCEDURE — 80053 COMPREHEN METABOLIC PANEL: CPT

## 2024-10-02 NOTE — PROGRESS NOTES
Pt arrived for therapeutic phlebotomy.  Vitals obtained and labs drawn.  Hct = 42.8, therefore procedure indicated per written parameters.  500 mL whole blood removed using R arm.  Pt tolerated well.  Monitored post procedure.  No s/s adverse reactions noted.  Vitals remained stable.  Pt discharged home, ambulatory per self.

## 2024-10-08 ENCOUNTER — OFFICE VISIT (OUTPATIENT)
Dept: ONCOLOGY | Age: 59
End: 2024-10-08
Payer: COMMERCIAL

## 2024-10-08 ENCOUNTER — TELEPHONE (OUTPATIENT)
Dept: ONCOLOGY | Age: 59
End: 2024-10-08

## 2024-10-08 VITALS
WEIGHT: 130 LBS | BODY MASS INDEX: 21.63 KG/M2 | DIASTOLIC BLOOD PRESSURE: 70 MMHG | HEART RATE: 82 BPM | SYSTOLIC BLOOD PRESSURE: 117 MMHG | TEMPERATURE: 97.1 F

## 2024-10-08 DIAGNOSIS — D47.1 MPN (MYELOPROLIFERATIVE NEOPLASM) (HCC): ICD-10-CM

## 2024-10-08 DIAGNOSIS — D50.8 OTHER IRON DEFICIENCY ANEMIA: ICD-10-CM

## 2024-10-08 DIAGNOSIS — D45 POLYCYTHEMIA VERA (HCC): Primary | ICD-10-CM

## 2024-10-08 DIAGNOSIS — D47.3 ESSENTIAL THROMBOCYTHEMIA (HCC): ICD-10-CM

## 2024-10-08 PROBLEM — D50.9 IRON DEFICIENCY ANEMIA: Status: ACTIVE | Noted: 2024-10-08

## 2024-10-08 PROCEDURE — G8427 DOCREV CUR MEDS BY ELIG CLIN: HCPCS | Performed by: INTERNAL MEDICINE

## 2024-10-08 PROCEDURE — 3074F SYST BP LT 130 MM HG: CPT | Performed by: INTERNAL MEDICINE

## 2024-10-08 PROCEDURE — G8484 FLU IMMUNIZE NO ADMIN: HCPCS | Performed by: INTERNAL MEDICINE

## 2024-10-08 PROCEDURE — 99214 OFFICE O/P EST MOD 30 MIN: CPT | Performed by: INTERNAL MEDICINE

## 2024-10-08 PROCEDURE — G8420 CALC BMI NORM PARAMETERS: HCPCS | Performed by: INTERNAL MEDICINE

## 2024-10-08 PROCEDURE — 3017F COLORECTAL CA SCREEN DOC REV: CPT | Performed by: INTERNAL MEDICINE

## 2024-10-08 PROCEDURE — 4004F PT TOBACCO SCREEN RCVD TLK: CPT | Performed by: INTERNAL MEDICINE

## 2024-10-08 PROCEDURE — 3078F DIAST BP <80 MM HG: CPT | Performed by: INTERNAL MEDICINE

## 2024-10-08 NOTE — PROGRESS NOTES
Change phlebotomy change phlebotomy to every 4 weeks      Patient ID: April Ayers, 1965, [id number], 59 y.o.  Referred by :  No ref. provider found   Reason for consultation: JAK2 mutation positive MPN    Problem list  JAK2 mutation positive MPN  Iron deficiency anemia  Family history of colon cancer  Weight loss      HISTORY OF PRESENT ILLNESS:    Oncologic History:    April Ayers is a very pleasant 59 y.o. female was referred for high hemoglobin hematocrit and positive JAK2 mutation also had platelet elevated at 601 WBC fluctuating up and down, reviewing labs for the last 3 years she had high hematocrit since at least 2019 with rising platelet count and fluctuating neutrophilia, patient denied history of stroke or thrombosis or myocardial infarction,she had diabetes and she is on nitroglycerin, she does have history of V. tach and stress test was negative and on Eliquis for A. Fib  Bone marrow biopsy and aspirate was done and there was increased megakaryocyte with hyperlobulated  review the bone marrow biopsy with the pathologist and its difficult to tell if its polycythemia vera versus essential thrombocythemia however it came under category JAK2 positive MPN  Patient did not show up for 3 months due to insurance changes  Not on Hydrea and platelet up to 726 and hematocrit above 45    Hematology treatment  As needed phlebotomy every 4 weeks  Hydrea started on July 1, 2023 500 mg p.o. daily> discontinue on July 18, 2023  With a rising platelet started Hydrea back on February 2024 dose escalated    Interim history  Patient presents to the clinic for a follow-up visit and to discuss results of lab work-up and other relevant clinical data.  Overall patient has been tolerating treatment without unexpected or severe side effects.    During this visit patient's allergy, social, medical, surgical history and medications were reviewed and updated.    Stable weight  on Hydrea 1000 alternating with 500

## 2024-10-08 NOTE — TELEPHONE ENCOUNTER
AVS from 10/8/24    Rtc in 8 weeks with lab    Labs ordered today  Ferritin  Complete this on or around 10/8/2024.  Iron and TIBC  Complete this on or around 10/8/2024.  CBC with Auto Differential  Complete this on or around 10/15/2024.  Comprehensive Metabolic Panel  Complete this on or around 10/15/2024.    Rv on 12/10/24    Labs week prior     PT was given AVS and appt schedule    Electronically signed by Brenda Benoit on 10/8/2024 at 12:30 PM

## 2024-10-27 ENCOUNTER — APPOINTMENT (OUTPATIENT)
Dept: GENERAL RADIOLOGY | Age: 59
End: 2024-10-27
Payer: COMMERCIAL

## 2024-10-27 ENCOUNTER — HOSPITAL ENCOUNTER (EMERGENCY)
Age: 59
Discharge: HOME | End: 2024-10-27
Attending: EMERGENCY MEDICINE
Payer: COMMERCIAL

## 2024-10-27 VITALS
DIASTOLIC BLOOD PRESSURE: 63 MMHG | HEART RATE: 57 BPM | RESPIRATION RATE: 21 BRPM | BODY MASS INDEX: 20.83 KG/M2 | TEMPERATURE: 97.8 F | OXYGEN SATURATION: 97 % | SYSTOLIC BLOOD PRESSURE: 138 MMHG | HEIGHT: 65 IN | WEIGHT: 125 LBS

## 2024-10-27 DIAGNOSIS — R42 DIZZINESS: Primary | ICD-10-CM

## 2024-10-27 DIAGNOSIS — J06.9 VIRAL URI WITH COUGH: ICD-10-CM

## 2024-10-27 LAB
ANION GAP SERPL CALCULATED.3IONS-SCNC: 10 MMOL/L (ref 9–16)
BASOPHILS # BLD: 0 K/UL (ref 0–0.2)
BASOPHILS NFR BLD: 1 % (ref 0–2)
BUN SERPL-MCNC: 10 MG/DL (ref 6–20)
CALCIUM SERPL-MCNC: 9.6 MG/DL (ref 8.6–10.4)
CHLORIDE SERPL-SCNC: 106 MMOL/L (ref 98–107)
CO2 SERPL-SCNC: 25 MMOL/L (ref 20–31)
CREAT SERPL-MCNC: 0.6 MG/DL (ref 0.7–1.2)
EOSINOPHIL # BLD: 0.1 K/UL (ref 0–0.4)
EOSINOPHILS RELATIVE PERCENT: 1 % (ref 0–4)
ERYTHROCYTE [DISTWIDTH] IN BLOOD BY AUTOMATED COUNT: 14.8 % (ref 11.5–14.9)
FLUAV RNA RESP QL NAA+PROBE: NOT DETECTED
FLUBV RNA RESP QL NAA+PROBE: NOT DETECTED
GFR, ESTIMATED: >90 ML/MIN/1.73M2
GLUCOSE SERPL-MCNC: 95 MG/DL (ref 74–99)
HCT VFR BLD AUTO: 38.1 % (ref 36–46)
HGB BLD-MCNC: 13 G/DL (ref 12–16)
LYMPHOCYTES NFR BLD: 2.1 K/UL (ref 1–4.8)
LYMPHOCYTES RELATIVE PERCENT: 39 % (ref 24–44)
MCH RBC QN AUTO: 35.7 PG (ref 26–34)
MCHC RBC AUTO-ENTMCNC: 34.1 G/DL (ref 31–37)
MCV RBC AUTO: 104.7 FL (ref 80–100)
MONOCYTES NFR BLD: 0.3 K/UL (ref 0.1–1.3)
MONOCYTES NFR BLD: 6 % (ref 1–7)
NEUTROPHILS NFR BLD: 53 % (ref 36–66)
NEUTS SEG NFR BLD: 2.8 K/UL (ref 1.3–9.1)
PLATELET # BLD AUTO: 260 K/UL (ref 150–450)
PMV BLD AUTO: 7.2 FL (ref 6–12)
POTASSIUM SERPL-SCNC: 3.7 MMOL/L (ref 3.7–5.3)
RBC # BLD AUTO: 3.64 M/UL (ref 4–5.2)
SARS-COV-2 RNA RESP QL NAA+PROBE: NOT DETECTED
SODIUM SERPL-SCNC: 141 MMOL/L (ref 136–145)
SOURCE: NORMAL
SPECIMEN DESCRIPTION: NORMAL
TROPONIN I SERPL HS-MCNC: 7 NG/L (ref 0–14)
TROPONIN I SERPL HS-MCNC: <6 NG/L (ref 0–14)
WBC OTHER # BLD: 5.3 K/UL (ref 3.5–11)

## 2024-10-27 PROCEDURE — 99285 EMERGENCY DEPT VISIT HI MDM: CPT

## 2024-10-27 PROCEDURE — 84484 ASSAY OF TROPONIN QUANT: CPT

## 2024-10-27 PROCEDURE — 93005 ELECTROCARDIOGRAM TRACING: CPT | Performed by: EMERGENCY MEDICINE

## 2024-10-27 PROCEDURE — 36415 COLL VENOUS BLD VENIPUNCTURE: CPT

## 2024-10-27 PROCEDURE — 71045 X-RAY EXAM CHEST 1 VIEW: CPT

## 2024-10-27 PROCEDURE — 87636 SARSCOV2 & INF A&B AMP PRB: CPT

## 2024-10-27 PROCEDURE — 6370000000 HC RX 637 (ALT 250 FOR IP): Performed by: EMERGENCY MEDICINE

## 2024-10-27 PROCEDURE — 80048 BASIC METABOLIC PNL TOTAL CA: CPT

## 2024-10-27 PROCEDURE — 85025 COMPLETE CBC W/AUTO DIFF WBC: CPT

## 2024-10-27 RX ORDER — MECLIZINE HYDROCHLORIDE 25 MG/1
25 TABLET ORAL ONCE
Status: COMPLETED | OUTPATIENT
Start: 2024-10-27 | End: 2024-10-27

## 2024-10-27 RX ORDER — MECLIZINE HCL 12.5 MG 12.5 MG/1
12.5 TABLET ORAL 3 TIMES DAILY PRN
Qty: 30 TABLET | Refills: 0 | Status: SHIPPED | OUTPATIENT
Start: 2024-10-27 | End: 2024-11-06

## 2024-10-27 RX ADMIN — MECLIZINE HYDROCHLORIDE 25 MG: 25 TABLET ORAL at 16:49

## 2024-10-27 ASSESSMENT — LIFESTYLE VARIABLES
HOW MANY STANDARD DRINKS CONTAINING ALCOHOL DO YOU HAVE ON A TYPICAL DAY: PATIENT DOES NOT DRINK
HOW OFTEN DO YOU HAVE A DRINK CONTAINING ALCOHOL: NEVER

## 2024-10-27 NOTE — ED TRIAGE NOTES
Mode of arrival (squad #, walk in, police, etc) : walk-in        Chief complaint(s): cough/congestion/dizziness        Arrival Note (brief scenario, treatment PTA, etc).: Pt reports cough/congestion x 5 days. Pt was workign today and got dizzy and decided to come in.        C= \"Have you ever felt that you should Cut down on your drinking?\"  No  A= \"Have people Annoyed you by criticizing your drinking?\"  No  G= \"Have you ever felt bad or Guilty about your drinking?\"  No  E= \"Have you ever had a drink as an Eye-opener first thing in the morning to steady your nerves or to help a hangover?\"  No      Deferred []      Reason for deferring: N/A    *If yes to two or more: probable alcohol abuse.*

## 2024-10-27 NOTE — ED PROVIDER NOTES
[facility] ED  eMERGENCY dEPARTMENT eNCOUnter    Pt Name: April Ayers  MRN: [id number]  Birthdate 1965  Date of evaluation: 10/27/24  CHIEF COMPLAINT       Chief Complaint   Patient presents with    Dizziness    Cough    Nasal Congestion     HISTORY OF PRESENT ILLNESS   HPI  Patient is a 59-year-old female presents to the emergency room with complaints of cough runny nose that she has had over the past 5 days.  Patient states that she has been feeling generalized weakness.  Patient complains of shortness of breath and chest tightness.  Patient did have nausea vomiting and diarrhea which has since improved.  Patient does admit to body aches as well as chills.  No fever.  Today patient was at work when she began to have dizziness which she describes as a room spinning sensation.  Patient states this got worse with any change in position.  REVIEW OF SYSTEMS     Constitutional: No fever, positive chills  Eye: No visual changes  Ear/Nose/Mouth/Throat: No sore throat, positive runny nose  Respiratory: Cough and shortness of breath  Cardiovascular: Chest tightness  Gastrointestinal: As above  Genitourinary: No dysuria  Musculoskeletal: As above  Integumentary: No rash  Neurologic: Dizziness  Psychiatric: No anxiety, no depression  All systems otherwise negative.        PAST MEDICAL HISTORY     Past Medical History:   Diagnosis Date    Chronic kidney disease     CKD (chronic kidney disease) stage 1, GFR 90 ml/min or greater     COPD (chronic obstructive pulmonary disease) (HCC)     Depression     Hyperlipidemia     Hypocalcemia     IBS (irritable bowel syndrome)     Multiple thyroid nodules 2024    Polycythemia vera (HCC) 2022    Primary hypertension 2022    Proteinuria      SURGICAL HISTORY       Past Surgical History:   Procedure Laterality Date    BREAST BIOPSY       SECTION      CS x 2    COLONOSCOPY      CT BONE MARROW BIOPSY  2022    CT BONE MARROW BIOPSY 2022 [facility] CT

## 2024-10-28 ENCOUNTER — TELEPHONE (OUTPATIENT)
Dept: FAMILY MEDICINE CLINIC | Age: 59
End: 2024-10-28

## 2024-10-28 ENCOUNTER — CARE COORDINATION (OUTPATIENT)
Dept: CARE COORDINATION | Age: 59
End: 2024-10-28

## 2024-10-28 DIAGNOSIS — J40 BRONCHITIS: Primary | ICD-10-CM

## 2024-10-28 LAB
EKG ATRIAL RATE: 66 BPM
EKG P AXIS: 77 DEGREES
EKG P-R INTERVAL: 144 MS
EKG Q-T INTERVAL: 402 MS
EKG QRS DURATION: 80 MS
EKG QTC CALCULATION (BAZETT): 421 MS
EKG R AXIS: 47 DEGREES
EKG T AXIS: 50 DEGREES
EKG VENTRICULAR RATE: 66 BPM

## 2024-10-28 PROCEDURE — 93010 ELECTROCARDIOGRAM REPORT: CPT | Performed by: INTERNAL MEDICINE

## 2024-10-28 RX ORDER — BENZONATATE 100 MG/1
100 CAPSULE ORAL 3 TIMES DAILY PRN
Qty: 21 CAPSULE | Refills: 0 | Status: SHIPPED | OUTPATIENT
Start: 2024-10-28 | End: 2024-11-04

## 2024-10-28 NOTE — CARE COORDINATION
Ambulatory Care Coordination Note     10/28/2024 8:47 AM     Patient outreach attempt by this ACM today to offer care management services. ACM was unable to reach the patient by telephone today; left voice message requesting a return phone call to this ACM.     ACM: Mercy Rider RN     Care Summary Note: Attempted to reach for ED follow up also. Sent Introduction Letter to patient via [employer].     PCP/Specialist follow up:   Future Appointments         Provider Specialty Dept Phone    11/27/2024 11:00 AM [employer] OP INFUSION CHAIR 03 Infusion Therapy [phone]    12/10/2024 1:45 PM Juana Wayne MD Oncology [phone]    1/10/2025 10:45 AM Meche De La Torre MD Family Medicine [phone]    1/22/2025 11:00 AM ST OP INFUSION CHAIR 03 Infusion Therapy [phone]            Follow Up:   Plan for next ACM outreach in approximately 1-2 days  to complete:  - outreach attempt to offer care management services.

## 2024-10-28 NOTE — TELEPHONE ENCOUNTER
I sent Tessalon Perles to the pharmacy she can start taking that for the cough can also try her inhalers

## 2024-10-28 NOTE — TELEPHONE ENCOUNTER
Patient seen in ER yesterday for upper respiratory infection. She was told it was viral so they did not prescribe anything for her besides something for dizziness. Patient would like to see what she can take OTC for her cough. She states its a hard dry cough. She has been using her nebulizer and inhalers for the chest tightness.  I offered to make her an appointment for ED follow up but she declined at this time.

## 2024-10-30 ENCOUNTER — CARE COORDINATION (OUTPATIENT)
Dept: CARE COORDINATION | Age: 59
End: 2024-10-30

## 2024-10-30 NOTE — CARE COORDINATION
Ambulatory Care Coordination Note     10/30/2024 1:54 PM     Patient outreach attempt by this ACM today to offer care management services. ACM was unable to reach the patient by telephone today; left voice message requesting a return phone call to this ACM.     ACM: Mercy Rider RN     Care Summary Note: Second attempt to reach patient for enrollment     PCP/Specialist follow up:   Future Appointments         Provider Specialty Dept Phone    11/27/2024 11:00 AM [facility] OP INFUSION CHAIR 03 Infusion Therapy [phone]    12/10/2024 1:45 PM Juana Wayne MD Oncology [phone]    1/10/2025 10:45 AM Meche De La Torre MD Family Medicine [phone]    1/22/2025 11:00 AM [facility] OP INFUSION CHAIR 03 Infusion Therapy [phone]            Follow Up:   Plan for next ACM outreach in approximately 1 week to complete:  - outreach attempt to offer care management services.

## 2024-11-07 ENCOUNTER — CARE COORDINATION (OUTPATIENT)
Dept: CARE COORDINATION | Age: 59
End: 2024-11-07

## 2024-11-07 NOTE — CARE COORDINATION
Ambulatory Care Coordination Note     11/7/2024 2:40 PM     patient outreach attempt by this ACM today to offer care management services. ACM was unable to reach the patient by telephone today; left voice message requesting a return phone call to this ACM.     Patient closed (unable to reach patient) from the High Risk Care Management program on 11/7/2024.  Patient  unable to assess progress .  Care management goals have been completed. No further Ambulatory Care Manager follow up scheduled.

## 2024-11-16 DIAGNOSIS — E55.9 VITAMIN D DEFICIENCY: ICD-10-CM

## 2024-11-18 RX ORDER — ERGOCALCIFEROL 1.25 MG/1
50000 CAPSULE, LIQUID FILLED ORAL WEEKLY
Qty: 12 CAPSULE | Refills: 0 | Status: SHIPPED | OUTPATIENT
Start: 2024-11-18 | End: 2024-11-20

## 2024-11-18 NOTE — TELEPHONE ENCOUNTER
Please Approve or Refuse.  Send to Pharmacy per Pt's Request: [facility]     Next Visit Date:  1/10/2025   Last Visit Date: 9/10/2024    Hemoglobin A1C (%)   Date Value   08/02/2024 5.3   02/27/2024 5.6   10/16/2023 5.8             ( goal A1C is < 7)   BP Readings from Last 3 Encounters:   10/27/24 138/63   10/08/24 117/70   10/02/24 121/79          (goal 120/80)  BUN   Date Value Ref Range Status   10/27/2024 10 6 - 20 mg/dL Final     Creatinine   Date Value Ref Range Status   10/27/2024 0.6 (L) 0.7 - 1.2 mg/dL Final     Potassium   Date Value Ref Range Status   10/27/2024 3.7 3.7 - 5.3 mmol/L Final

## 2024-11-20 DIAGNOSIS — E55.9 VITAMIN D DEFICIENCY: ICD-10-CM

## 2024-11-20 RX ORDER — ERGOCALCIFEROL 1.25 MG/1
50000 CAPSULE, LIQUID FILLED ORAL WEEKLY
Qty: 12 CAPSULE | Refills: 0 | Status: SHIPPED | OUTPATIENT
Start: 2024-11-20

## 2024-12-05 ENCOUNTER — HOSPITAL ENCOUNTER (OUTPATIENT)
Dept: INFUSION THERAPY | Age: 59
Setting detail: INFUSION SERIES
Discharge: HOME | End: 2024-12-05
Payer: COMMERCIAL

## 2024-12-05 ENCOUNTER — HOSPITAL ENCOUNTER (OUTPATIENT)
Age: 59
Discharge: HOME | End: 2024-12-05
Payer: COMMERCIAL

## 2024-12-05 VITALS
RESPIRATION RATE: 18 BRPM | DIASTOLIC BLOOD PRESSURE: 69 MMHG | HEART RATE: 76 BPM | SYSTOLIC BLOOD PRESSURE: 115 MMHG | TEMPERATURE: 97 F | OXYGEN SATURATION: 95 %

## 2024-12-05 DIAGNOSIS — D45 POLYCYTHEMIA VERA (HCC): ICD-10-CM

## 2024-12-05 DIAGNOSIS — D50.8 OTHER IRON DEFICIENCY ANEMIA: ICD-10-CM

## 2024-12-05 LAB
ALBUMIN SERPL-MCNC: 4.3 G/DL (ref 3.5–5.2)
ALP SERPL-CCNC: 102 U/L (ref 35–104)
ALT SERPL-CCNC: 13 U/L (ref 10–35)
ANION GAP SERPL CALCULATED.3IONS-SCNC: 11 MMOL/L (ref 9–16)
AST SERPL-CCNC: 22 U/L (ref 10–35)
BILIRUB SERPL-MCNC: 0.2 MG/DL (ref 0–1.2)
BUN SERPL-MCNC: 9 MG/DL (ref 6–20)
CALCIUM SERPL-MCNC: 9.6 MG/DL (ref 8.6–10.4)
CHLORIDE SERPL-SCNC: 106 MMOL/L (ref 98–107)
CO2 SERPL-SCNC: 25 MMOL/L (ref 20–31)
CREAT SERPL-MCNC: 0.7 MG/DL (ref 0.7–1.2)
ERYTHROCYTE [DISTWIDTH] IN BLOOD BY AUTOMATED COUNT: 13.9 % (ref 11.5–14.9)
ERYTHROCYTE [DISTWIDTH] IN BLOOD BY AUTOMATED COUNT: 13.9 % (ref 11.5–14.9)
FERRITIN SERPL-MCNC: 18 NG/ML (ref 15–150)
GFR, ESTIMATED: >90 ML/MIN/1.73M2
GLUCOSE SERPL-MCNC: 95 MG/DL (ref 74–99)
HCT VFR BLD AUTO: 45.8 % (ref 36–46)
HCT VFR BLD AUTO: 45.8 % (ref 36–46)
HGB BLD-MCNC: 15.3 G/DL (ref 12–16)
HGB BLD-MCNC: 15.3 G/DL (ref 12–16)
IRON SATN MFR SERPL: 20 % (ref 20–55)
IRON SERPL-MCNC: 78 UG/DL (ref 37–145)
MCH RBC QN AUTO: 34.7 PG (ref 26–34)
MCH RBC QN AUTO: 34.7 PG (ref 26–34)
MCHC RBC AUTO-ENTMCNC: 33.4 G/DL (ref 31–37)
MCHC RBC AUTO-ENTMCNC: 33.4 G/DL (ref 31–37)
MCV RBC AUTO: 103.9 FL (ref 80–100)
MCV RBC AUTO: 103.9 FL (ref 80–100)
PLATELET # BLD AUTO: 296 K/UL (ref 150–450)
PLATELET # BLD AUTO: 296 K/UL (ref 150–450)
PMV BLD AUTO: 7.6 FL (ref 6–12)
PMV BLD AUTO: 7.6 FL (ref 6–12)
POTASSIUM SERPL-SCNC: 4 MMOL/L (ref 3.7–5.3)
PROT SERPL-MCNC: 7.3 G/DL (ref 6.6–8.7)
RBC # BLD AUTO: 4.41 M/UL (ref 4–5.2)
RBC # BLD AUTO: 4.41 M/UL (ref 4–5.2)
SODIUM SERPL-SCNC: 142 MMOL/L (ref 136–145)
TIBC SERPL-MCNC: 399 UG/DL (ref 250–450)
UNSATURATED IRON BINDING CAPACITY: 321 UG/DL (ref 112–347)
WBC OTHER # BLD: 5.7 K/UL (ref 3.5–11)
WBC OTHER # BLD: 5.7 K/UL (ref 3.5–11)

## 2024-12-05 PROCEDURE — 83550 IRON BINDING TEST: CPT

## 2024-12-05 PROCEDURE — 83540 ASSAY OF IRON: CPT

## 2024-12-05 PROCEDURE — 85027 COMPLETE CBC AUTOMATED: CPT

## 2024-12-05 PROCEDURE — 80053 COMPREHEN METABOLIC PANEL: CPT

## 2024-12-05 PROCEDURE — 99195 PHLEBOTOMY: CPT

## 2024-12-05 PROCEDURE — 82728 ASSAY OF FERRITIN: CPT

## 2024-12-05 PROCEDURE — 36415 COLL VENOUS BLD VENIPUNCTURE: CPT

## 2024-12-05 NOTE — PROGRESS NOTES
Pt arrived for therapeutic phlebotomy.  Vitals obtained and labs drawn.  Hct = 45.8, therefore procedure indicated per written parameters.  500 mL whole blood removed using R arm.  Pt tolerated well.  Monitored post procedure.  No s/s adverse reactions noted.  Vitals remained stable.  Pt discharged home, ambulatory per self.

## 2024-12-10 ENCOUNTER — TELEPHONE (OUTPATIENT)
Dept: ONCOLOGY | Age: 59
End: 2024-12-10

## 2024-12-10 ENCOUNTER — OFFICE VISIT (OUTPATIENT)
Dept: ONCOLOGY | Age: 59
End: 2024-12-10
Payer: COMMERCIAL

## 2024-12-10 VITALS
WEIGHT: 131 LBS | BODY MASS INDEX: 21.8 KG/M2 | HEART RATE: 94 BPM | SYSTOLIC BLOOD PRESSURE: 153 MMHG | TEMPERATURE: 96.8 F | DIASTOLIC BLOOD PRESSURE: 96 MMHG

## 2024-12-10 DIAGNOSIS — Z15.89 JAK-2 GENE MUTATION: ICD-10-CM

## 2024-12-10 DIAGNOSIS — D45 POLYCYTHEMIA VERA (HCC): Primary | ICD-10-CM

## 2024-12-10 DIAGNOSIS — D47.3 ESSENTIAL THROMBOCYTHEMIA (HCC): ICD-10-CM

## 2024-12-10 PROCEDURE — 4004F PT TOBACCO SCREEN RCVD TLK: CPT | Performed by: INTERNAL MEDICINE

## 2024-12-10 PROCEDURE — G8420 CALC BMI NORM PARAMETERS: HCPCS | Performed by: INTERNAL MEDICINE

## 2024-12-10 PROCEDURE — G8427 DOCREV CUR MEDS BY ELIG CLIN: HCPCS | Performed by: INTERNAL MEDICINE

## 2024-12-10 PROCEDURE — 3080F DIAST BP >= 90 MM HG: CPT | Performed by: INTERNAL MEDICINE

## 2024-12-10 PROCEDURE — 3017F COLORECTAL CA SCREEN DOC REV: CPT | Performed by: INTERNAL MEDICINE

## 2024-12-10 PROCEDURE — 3077F SYST BP >= 140 MM HG: CPT | Performed by: INTERNAL MEDICINE

## 2024-12-10 PROCEDURE — 99214 OFFICE O/P EST MOD 30 MIN: CPT | Performed by: INTERNAL MEDICINE

## 2024-12-10 PROCEDURE — G8484 FLU IMMUNIZE NO ADMIN: HCPCS | Performed by: INTERNAL MEDICINE

## 2024-12-10 NOTE — TELEPHONE ENCOUNTER
AVS 12/10/24    Rtc in 2 months with labs    Labs ordered today  CBC with Auto Differential  Complete this on or around 12/17/2024.  Comprehensive Metabolic Panel  Complete this on or around 12/17/2024.    RV 02/14/25    Labs to be done week prior    PT was given AVS and appt schedule    Electronically signed by Junie Boateng on 12/10/2024 at 2:07 PM

## 2024-12-10 NOTE — PROGRESS NOTES
44  Weight loss> CT chest abdomen pelvis was denied by insurance at this time weight stable  Continue as needed phlebotomy(the iron deficiency anemia corrected hematocrit)  Iron deficiency anemia> resolved  Platelet and hemoglobin in the normal limit > continue Hydrea 500 alternating with 1000 every other day>   RTC in 8 weeks        Thank you for the consult                                        Juana Wayne MD                          [facility]                            This note is created with the assistance of a speech recognition program.  While intending to generate a document that actually reflects the content of the visit, the document can still have some errors including those of syntax and sound a like substitutions which may escape proof reading.  It such instances, actual meaning can be extrapolated by contextual diversion.

## 2025-01-10 ENCOUNTER — OFFICE VISIT (OUTPATIENT)
Dept: FAMILY MEDICINE CLINIC | Age: 60
End: 2025-01-10
Payer: COMMERCIAL

## 2025-01-10 VITALS
HEIGHT: 65 IN | BODY MASS INDEX: 21.83 KG/M2 | HEART RATE: 76 BPM | OXYGEN SATURATION: 98 % | WEIGHT: 131 LBS | DIASTOLIC BLOOD PRESSURE: 70 MMHG | SYSTOLIC BLOOD PRESSURE: 110 MMHG

## 2025-01-10 DIAGNOSIS — Z12.11 COLON CANCER SCREENING: ICD-10-CM

## 2025-01-10 DIAGNOSIS — Z23 ENCOUNTER FOR IMMUNIZATION: ICD-10-CM

## 2025-01-10 DIAGNOSIS — Z15.89 JAK-2 GENE MUTATION: ICD-10-CM

## 2025-01-10 DIAGNOSIS — E04.1 THYROID NODULE: ICD-10-CM

## 2025-01-10 DIAGNOSIS — D47.3 ESSENTIAL THROMBOCYTHEMIA (HCC): Primary | ICD-10-CM

## 2025-01-10 DIAGNOSIS — E27.9 ADRENAL NODULE (HCC): ICD-10-CM

## 2025-01-10 DIAGNOSIS — E78.2 MIXED HYPERLIPIDEMIA: ICD-10-CM

## 2025-01-10 DIAGNOSIS — I48.19 ATRIAL FIBRILLATION, PERSISTENT (HCC): ICD-10-CM

## 2025-01-10 DIAGNOSIS — J41.0 SIMPLE CHRONIC BRONCHITIS (HCC): ICD-10-CM

## 2025-01-10 PROCEDURE — 3023F SPIROM DOC REV: CPT | Performed by: FAMILY MEDICINE

## 2025-01-10 PROCEDURE — 4004F PT TOBACCO SCREEN RCVD TLK: CPT | Performed by: FAMILY MEDICINE

## 2025-01-10 PROCEDURE — 3078F DIAST BP <80 MM HG: CPT | Performed by: FAMILY MEDICINE

## 2025-01-10 PROCEDURE — 3074F SYST BP LT 130 MM HG: CPT | Performed by: FAMILY MEDICINE

## 2025-01-10 PROCEDURE — G8420 CALC BMI NORM PARAMETERS: HCPCS | Performed by: FAMILY MEDICINE

## 2025-01-10 PROCEDURE — 3017F COLORECTAL CA SCREEN DOC REV: CPT | Performed by: FAMILY MEDICINE

## 2025-01-10 PROCEDURE — 90471 IMMUNIZATION ADMIN: CPT | Performed by: FAMILY MEDICINE

## 2025-01-10 PROCEDURE — 90746 HEPB VACCINE 3 DOSE ADULT IM: CPT | Performed by: FAMILY MEDICINE

## 2025-01-10 PROCEDURE — 99214 OFFICE O/P EST MOD 30 MIN: CPT | Performed by: FAMILY MEDICINE

## 2025-01-10 PROCEDURE — G8427 DOCREV CUR MEDS BY ELIG CLIN: HCPCS | Performed by: FAMILY MEDICINE

## 2025-01-10 RX ORDER — ASPIRIN 81 MG/1
TABLET ORAL
Qty: 90 TABLET | Refills: 3 | Status: SHIPPED | OUTPATIENT
Start: 2025-01-10

## 2025-01-10 SDOH — ECONOMIC STABILITY: FOOD INSECURITY: WITHIN THE PAST 12 MONTHS, THE FOOD YOU BOUGHT JUST DIDN'T LAST AND YOU DIDN'T HAVE MONEY TO GET MORE.: NEVER TRUE

## 2025-01-10 SDOH — ECONOMIC STABILITY: FOOD INSECURITY: WITHIN THE PAST 12 MONTHS, YOU WORRIED THAT YOUR FOOD WOULD RUN OUT BEFORE YOU GOT MONEY TO BUY MORE.: NEVER TRUE

## 2025-01-10 ASSESSMENT — ENCOUNTER SYMPTOMS
CHEST TIGHTNESS: 0
WHEEZING: 0
ABDOMINAL DISTENTION: 0
TROUBLE SWALLOWING: 0
RHINORRHEA: 0
VOMITING: 0
NAUSEA: 0
RECTAL PAIN: 0
DIARRHEA: 0
SHORTNESS OF BREATH: 0
EYE REDNESS: 0
COLOR CHANGE: 0
BLOOD IN STOOL: 0
ABDOMINAL PAIN: 0
COUGH: 0
BACK PAIN: 1
STRIDOR: 0
CONSTIPATION: 0
SORE THROAT: 0
SINUS PRESSURE: 0

## 2025-01-10 ASSESSMENT — PATIENT HEALTH QUESTIONNAIRE - PHQ9
10. IF YOU CHECKED OFF ANY PROBLEMS, HOW DIFFICULT HAVE THESE PROBLEMS MADE IT FOR YOU TO DO YOUR WORK, TAKE CARE OF THINGS AT HOME, OR GET ALONG WITH OTHER PEOPLE: NOT DIFFICULT AT ALL
6. FEELING BAD ABOUT YOURSELF - OR THAT YOU ARE A FAILURE OR HAVE LET YOURSELF OR YOUR FAMILY DOWN: SEVERAL DAYS
1. LITTLE INTEREST OR PLEASURE IN DOING THINGS: NOT AT ALL
4. FEELING TIRED OR HAVING LITTLE ENERGY: SEVERAL DAYS
7. TROUBLE CONCENTRATING ON THINGS, SUCH AS READING THE NEWSPAPER OR WATCHING TELEVISION: SEVERAL DAYS
5. POOR APPETITE OR OVEREATING: SEVERAL DAYS
SUM OF ALL RESPONSES TO PHQ9 QUESTIONS 1 & 2: 3
3. TROUBLE FALLING OR STAYING ASLEEP: SEVERAL DAYS
2. FEELING DOWN, DEPRESSED OR HOPELESS: NEARLY EVERY DAY
SUM OF ALL RESPONSES TO PHQ QUESTIONS 1-9: 9
8. MOVING OR SPEAKING SO SLOWLY THAT OTHER PEOPLE COULD HAVE NOTICED. OR THE OPPOSITE, BEING SO FIGETY OR RESTLESS THAT YOU HAVE BEEN MOVING AROUND A LOT MORE THAN USUAL: SEVERAL DAYS
9. THOUGHTS THAT YOU WOULD BE BETTER OFF DEAD, OR OF HURTING YOURSELF: NOT AT ALL

## 2025-01-10 NOTE — PROGRESS NOTES
Visit Information    Have you changed or started any medications since your last visit including any over-the-counter medicines, vitamins, or herbal medicines? no   Are you having any side effects from any of your medications? -  no  Have you stopped taking any of your medications? Is so, why? -  no    Have you seen any other physician or provider since your last visit? No  Have you had any other diagnostic tests since your last visit? No  Have you been seen in the emergency room and/or had an admission to a hospital since we last saw you? No  Have you had your routine dental cleaning in the past 6 months? no    Have you activated your [employer] account? If not, what are your barriers? Yes     Patient Care Team:  Meche De La Torre MD as PCP - General (Family Medicine)  Meche De La Torre MD as PCP - Empaneled Provider  Alejandra Meier MD as Surgeon (Colon and Rectal Surgery)    Medical History Review  Past Medical, Family, and Social History reviewed and does contribute to the patient presenting condition    Health Maintenance   Topic Date Due    COVID-19 Vaccine (1) Never done    Shingles vaccine (1 of 2) Never done    Pneumococcal 0-64 years Vaccine (2 of 2 - PCV) 08/13/2019    Colorectal Cancer Screen  08/30/2019    Lipids  07/07/2023    Flu vaccine (1) Never done    Hepatitis B vaccine (3 of 3 - 19+ 3-dose series) 09/06/2024    Depression Monitoring  02/07/2025    Lung Cancer Screening &/or Counseling  04/23/2025    A1C test (Diabetic or Prediabetic)  08/02/2025    Cervical cancer screen  01/04/2026    Breast cancer screen  06/19/2026    DTaP/Tdap/Td vaccine (2 - Td or Tdap) 10/14/2027    Hepatitis C screen  Completed    HIV screen  Completed    Hepatitis A vaccine  Aged Out    Hib vaccine  Aged Out    Polio vaccine  Aged Out    Meningococcal (ACWY) vaccine  Aged Out

## 2025-01-10 NOTE — PROGRESS NOTES
Chief Complaint   Patient presents with    Hyperlipidemia       The patient (or guardian, if applicable) and other individuals in attendance with the patient were advised that Artificial Intelligence will be utilized during this visit to record, process the conversation to generate a clinical note, and support improvement of the AI technology. The patient (or guardian, if applicable) and other individuals in attendance at the appointment consented to the use of AI, including the recording.                  Hyperlipidemia      History of Present Illness  The patient presents for a follow-up visit.    She reports that her depression is not well-managed despite adherence to her prescribed medication regimen. She has no adverse reactions to her current medications.    She has been compliant with her inhaler therapy and Eliquis regimen. She has not required Mucinex recently due to the absence of related symptoms.    She is under the regular care of an oncologist for hematological issues, with phlebotomy procedures scheduled every 3 to 6 months depending on her condition.    Her atrial fibrillation is not optimally controlled, with fluctuations in heart rate that she attributes to stress. She has not consulted her cardiologist in approximately 6 months.    She underwent blood work in December 2024 and October 2024. She had a mammogram in June 2024 and is not due for another until 2025. She had a thyroid ultrasound, which showed no new spots and a decrease in size of a previously noted spot. She has not completed her cholesterol test or stool test for colon cancer screening. She declines the pneumonia vaccine. She recently experienced a viral illness that resulted in a 4-day absence from work.    Thyroid nodule which has improved on the recent ultrasound.    She is currently not taking aspirin due to running out of the medication and requires a refill.    SOCIAL HISTORY  The patient admits to smoking, stating that a pack

## 2025-02-24 ENCOUNTER — TELEPHONE (OUTPATIENT)
Dept: ONCOLOGY | Age: 60
End: 2025-02-24

## 2025-03-13 ENCOUNTER — HOSPITAL ENCOUNTER (OUTPATIENT)
Dept: INFUSION THERAPY | Age: 60
Setting detail: INFUSION SERIES
Discharge: HOME | End: 2025-03-13
Payer: COMMERCIAL

## 2025-03-13 VITALS
RESPIRATION RATE: 17 BRPM | HEART RATE: 72 BPM | OXYGEN SATURATION: 96 % | DIASTOLIC BLOOD PRESSURE: 76 MMHG | SYSTOLIC BLOOD PRESSURE: 131 MMHG | TEMPERATURE: 96.8 F

## 2025-03-13 DIAGNOSIS — D45 POLYCYTHEMIA VERA (HCC): Primary | ICD-10-CM

## 2025-03-13 LAB
ERYTHROCYTE [DISTWIDTH] IN BLOOD BY AUTOMATED COUNT: 15.8 % (ref 11.5–14.9)
HCT VFR BLD AUTO: 41.6 % (ref 36–46)
HGB BLD-MCNC: 13.7 G/DL (ref 12–16)
MCH RBC QN AUTO: 30.3 PG (ref 26–34)
MCHC RBC AUTO-ENTMCNC: 32.9 G/DL (ref 31–37)
MCV RBC AUTO: 92.1 FL (ref 80–100)
PLATELET # BLD AUTO: 323 K/UL (ref 150–450)
PMV BLD AUTO: 7.6 FL (ref 6–12)
RBC # BLD AUTO: 4.51 M/UL (ref 4–5.2)
WBC OTHER # BLD: 6.4 K/UL (ref 3.5–11)

## 2025-03-13 PROCEDURE — 85027 COMPLETE CBC AUTOMATED: CPT

## 2025-03-13 PROCEDURE — 36415 COLL VENOUS BLD VENIPUNCTURE: CPT

## 2025-03-13 NOTE — PROGRESS NOTES
Pt arrived for therapeutic phlebotomy.  Vitals obtained and labs drawn.  Hct = 41.6, therefore procedure NOT indicated per written parameters.  Pt discharged home, ambulatory per self.

## 2025-03-17 DIAGNOSIS — E78.2 MIXED HYPERLIPIDEMIA: ICD-10-CM

## 2025-03-17 NOTE — TELEPHONE ENCOUNTER
Please Approve or Refuse.  Send to Pharmacy per Pt's Request: jose r     Next Visit Date:  4/15/2025   Last Visit Date: 1/10/2025    Hemoglobin A1C (%)   Date Value   08/02/2024 5.3   02/27/2024 5.6   10/16/2023 5.8             ( goal A1C is < 7)   BP Readings from Last 3 Encounters:   03/13/25 131/76   01/10/25 110/70   12/10/24 (!) 153/96          (goal 120/80)  BUN   Date Value Ref Range Status   12/05/2024 9 6 - 20 mg/dL Final     Creatinine   Date Value Ref Range Status   12/05/2024 0.7 0.7 - 1.2 mg/dL Final     Potassium   Date Value Ref Range Status   12/05/2024 4.0 3.7 - 5.3 mmol/L Final

## 2025-03-18 RX ORDER — ATORVASTATIN CALCIUM 40 MG/1
40 TABLET, FILM COATED ORAL DAILY
Qty: 90 TABLET | Refills: 1 | Status: SHIPPED | OUTPATIENT
Start: 2025-03-18

## 2025-03-24 ENCOUNTER — TELEPHONE (OUTPATIENT)
Dept: ONCOLOGY | Age: 60
End: 2025-03-24

## 2025-05-06 ENCOUNTER — OFFICE VISIT (OUTPATIENT)
Age: 60
End: 2025-05-06
Payer: COMMERCIAL

## 2025-05-06 ENCOUNTER — TELEPHONE (OUTPATIENT)
Age: 60
End: 2025-05-06

## 2025-05-06 VITALS
SYSTOLIC BLOOD PRESSURE: 162 MMHG | DIASTOLIC BLOOD PRESSURE: 84 MMHG | RESPIRATION RATE: 16 BRPM | HEART RATE: 69 BPM | TEMPERATURE: 98 F | BODY MASS INDEX: 21.8 KG/M2 | WEIGHT: 131 LBS

## 2025-05-06 DIAGNOSIS — I48.19 ATRIAL FIBRILLATION, PERSISTENT (HCC): Primary | ICD-10-CM

## 2025-05-06 DIAGNOSIS — D50.8 OTHER IRON DEFICIENCY ANEMIA: ICD-10-CM

## 2025-05-06 DIAGNOSIS — R63.4 WEIGHT LOSS: ICD-10-CM

## 2025-05-06 DIAGNOSIS — D47.3 ESSENTIAL THROMBOCYTHEMIA (HCC): ICD-10-CM

## 2025-05-06 DIAGNOSIS — K76.9 LIVER LESION: ICD-10-CM

## 2025-05-06 DIAGNOSIS — D45 POLYCYTHEMIA VERA (HCC): ICD-10-CM

## 2025-05-06 DIAGNOSIS — Z15.89 JAK-2 GENE MUTATION: ICD-10-CM

## 2025-05-06 DIAGNOSIS — E27.9 LESION OF ADRENAL GLAND: ICD-10-CM

## 2025-05-06 PROCEDURE — 3017F COLORECTAL CA SCREEN DOC REV: CPT | Performed by: INTERNAL MEDICINE

## 2025-05-06 PROCEDURE — 3079F DIAST BP 80-89 MM HG: CPT | Performed by: INTERNAL MEDICINE

## 2025-05-06 PROCEDURE — G8420 CALC BMI NORM PARAMETERS: HCPCS | Performed by: INTERNAL MEDICINE

## 2025-05-06 PROCEDURE — 3077F SYST BP >= 140 MM HG: CPT | Performed by: INTERNAL MEDICINE

## 2025-05-06 PROCEDURE — 4004F PT TOBACCO SCREEN RCVD TLK: CPT | Performed by: INTERNAL MEDICINE

## 2025-05-06 PROCEDURE — 99214 OFFICE O/P EST MOD 30 MIN: CPT | Performed by: INTERNAL MEDICINE

## 2025-05-06 PROCEDURE — G8427 DOCREV CUR MEDS BY ELIG CLIN: HCPCS | Performed by: INTERNAL MEDICINE

## 2025-05-06 NOTE — TELEPHONE ENCOUNTER
AVS 05/06/25    Instructions   from Dr. Juana Wayne MD    LABS PRIOR TO NEXT VISIT  REFER TO GI FOR COLONOSCOPY  RTC after the scan    Referrals made today  Bhupendra López MD, Gastroenterology, [facility] (Dr. Bhupendra Obrien MD)  Where: [facility]  Address: [address] [place] [address]  Phone: [phone]  CT Chest Abdomen Pelvis With Contrast    Labs ordered today  Ferritin  Complete this on or around 5/6/2025.  Iron and TIBC  Complete this on or around 5/6/2025.  CBC with Auto Differential  Complete this on or around 5/13/2025.  Comprehensive Metabolic Panel  Complete this on or around 5/13/2025.    Return in about 3 weeks  (around 5/27/2025).    RV 05/30/25    Labs to be done week prior    Pt has referral for GI, Pt is aware that GI will contact pt but if they do not pt has number, office faxed referral to Gi, sent successfully.    PT was given orders, scheduling instructions, AVS and an appt schedule          Electronically signed by Junie Boateng on 5/6/2025 at 1:50 PM

## 2025-05-06 NOTE — PROGRESS NOTES
Change phlebotomy change phlebotomy to every 4 weeks      Patient ID: April Ayers, 1965, [id number], 60 y.o.  Referred by :  No ref. provider found   Reason for consultation: JAK2 mutation positive MPN    Problem list  JAK2 mutation positive MPN  Iron deficiency anemia  Family history of colon cancer  Weight loss      HISTORY OF PRESENT ILLNESS:    Oncologic History:    April Ayers is a very pleasant 60 y.o. female was referred for high hemoglobin hematocrit and positive JAK2 mutation also had platelet elevated at 601 WBC fluctuating up and down, reviewing labs for the last 3 years she had high hematocrit since at least 2019 with rising platelet count and fluctuating neutrophilia, patient denied history of stroke or thrombosis or myocardial infarction,she had diabetes and she is on nitroglycerin, she does have history of V. tach and stress test was negative and on Eliquis for A. Fib  Bone marrow biopsy and aspirate was done and there was increased megakaryocyte with hyperlobulated  review the bone marrow biopsy with the pathologist and its difficult to tell if its polycythemia vera versus essential thrombocythemia however it came under category JAK2 positive MPN  Patient did not show up for 3 months due to insurance changes  Not on Hydrea and platelet up to 726 and hematocrit above 45    Hematology treatment  As needed phlebotomy every 4 weeks  Hydrea started on July 1, 2023 500 mg p.o. daily> discontinue on July 18, 2023  With a rising platelet started Hydrea back on February 2024 dose escalated    Interim history  Patient presents to the clinic for a follow-up visit and to discuss results of lab work-up and other relevant clinical data.  Overall patient has been tolerating treatment without unexpected or severe side effects.    During this visit patient's allergy, social, medical, surgical history and medications were reviewed and updated.    Still losing weight and still smoke  on Hydrea 1000

## 2025-05-09 ENCOUNTER — OFFICE VISIT (OUTPATIENT)
Dept: FAMILY MEDICINE CLINIC | Age: 60
End: 2025-05-09

## 2025-05-09 VITALS
WEIGHT: 131 LBS | HEIGHT: 65 IN | DIASTOLIC BLOOD PRESSURE: 70 MMHG | HEART RATE: 74 BPM | SYSTOLIC BLOOD PRESSURE: 110 MMHG | BODY MASS INDEX: 21.83 KG/M2 | OXYGEN SATURATION: 96 %

## 2025-05-09 DIAGNOSIS — Z87.891 PERSONAL HISTORY OF TOBACCO USE: ICD-10-CM

## 2025-05-09 DIAGNOSIS — Z23 ENCOUNTER FOR IMMUNIZATION: ICD-10-CM

## 2025-05-09 DIAGNOSIS — E55.9 VITAMIN D DEFICIENCY: ICD-10-CM

## 2025-05-09 DIAGNOSIS — K58.1 IRRITABLE BOWEL SYNDROME WITH CONSTIPATION: Primary | ICD-10-CM

## 2025-05-09 DIAGNOSIS — I10 PRIMARY HYPERTENSION: ICD-10-CM

## 2025-05-09 DIAGNOSIS — Z12.31 ENCOUNTER FOR SCREENING MAMMOGRAM FOR HIGH-RISK PATIENT: ICD-10-CM

## 2025-05-09 DIAGNOSIS — E78.2 MIXED HYPERLIPIDEMIA: ICD-10-CM

## 2025-05-09 DIAGNOSIS — J41.0 SIMPLE CHRONIC BRONCHITIS (HCC): ICD-10-CM

## 2025-05-09 DIAGNOSIS — D50.8 OTHER IRON DEFICIENCY ANEMIA: ICD-10-CM

## 2025-05-09 DIAGNOSIS — E04.1 THYROID NODULE: ICD-10-CM

## 2025-05-09 DIAGNOSIS — R73.9 HYPERGLYCEMIA: ICD-10-CM

## 2025-05-09 DIAGNOSIS — I48.11 LONGSTANDING PERSISTENT ATRIAL FIBRILLATION (HCC): ICD-10-CM

## 2025-05-09 PROBLEM — D72.828 NEUTROPHILIA: Status: RESOLVED | Noted: 2022-07-26 | Resolved: 2025-05-09

## 2025-05-09 PROCEDURE — G8420 CALC BMI NORM PARAMETERS: HCPCS | Performed by: FAMILY MEDICINE

## 2025-05-09 PROCEDURE — 99214 OFFICE O/P EST MOD 30 MIN: CPT | Performed by: FAMILY MEDICINE

## 2025-05-09 PROCEDURE — G0296 VISIT TO DETERM LDCT ELIG: HCPCS | Performed by: FAMILY MEDICINE

## 2025-05-09 PROCEDURE — G8427 DOCREV CUR MEDS BY ELIG CLIN: HCPCS | Performed by: FAMILY MEDICINE

## 2025-05-09 PROCEDURE — 3074F SYST BP LT 130 MM HG: CPT | Performed by: FAMILY MEDICINE

## 2025-05-09 PROCEDURE — 3023F SPIROM DOC REV: CPT | Performed by: FAMILY MEDICINE

## 2025-05-09 PROCEDURE — 3017F COLORECTAL CA SCREEN DOC REV: CPT | Performed by: FAMILY MEDICINE

## 2025-05-09 PROCEDURE — 4004F PT TOBACCO SCREEN RCVD TLK: CPT | Performed by: FAMILY MEDICINE

## 2025-05-09 PROCEDURE — 3078F DIAST BP <80 MM HG: CPT | Performed by: FAMILY MEDICINE

## 2025-05-09 ASSESSMENT — ENCOUNTER SYMPTOMS
ABDOMINAL PAIN: 0
WHEEZING: 0
RHINORRHEA: 0
COLOR CHANGE: 0
COUGH: 0
CHEST TIGHTNESS: 0
RECTAL PAIN: 0
EYE REDNESS: 0
DIARRHEA: 0
ABDOMINAL DISTENTION: 0
SINUS PRESSURE: 0
VOMITING: 0
NAUSEA: 0
SHORTNESS OF BREATH: 0
SORE THROAT: 0
TROUBLE SWALLOWING: 0
BLOOD IN STOOL: 0
CONSTIPATION: 0
STRIDOR: 0

## 2025-05-09 NOTE — PROGRESS NOTES
Visit Information    Have you changed or started any medications since your last visit including any over-the-counter medicines, vitamins, or herbal medicines? no   Are you having any side effects from any of your medications? -  no  Have you stopped taking any of your medications? Is so, why? -  no    Have you seen any other physician or provider since your last visit? No  Have you had any other diagnostic tests since your last visit? No  Have you been seen in the emergency room and/or had an admission to a hospital since we last saw you? No  Have you had your routine dental cleaning in the past 6 months? no    Have you activated your [employer] account? If not, what are your barriers? Yes     Patient Care Team:  Meche De La Torre MD as PCP - General (Family Medicine)  Meche De La Torre MD as PCP - Empaneled Provider  Alejandra Meier MD as Surgeon (Colon and Rectal Surgery)    Medical History Review  Past Medical, Family, and Social History reviewed and does contribute to the patient presenting condition    Health Maintenance   Topic Date Due    Pneumococcal 50+ years Vaccine (2 of 2 - PCV) 08/13/2019    Colorectal Cancer Screen  08/30/2019    Lipids  07/07/2023    Lung Cancer Screening &/or Counseling  04/23/2025    Shingles vaccine (1 of 2) 04/15/2026 (Originally 2/14/1984)    COVID-19 Vaccine (1) 04/15/2026 (Originally 2/14/1970)    Flu vaccine (Season Ended) 08/01/2025    A1C test (Diabetic or Prediabetic)  08/02/2025    Cervical cancer screen  01/04/2026    Depression Monitoring  01/10/2026    Breast cancer screen  06/19/2026    DTaP/Tdap/Td vaccine (2 - Td or Tdap) 10/14/2027    Hepatitis B vaccine  Completed    Hepatitis C screen  Completed    HIV screen  Completed    Hepatitis A vaccine  Aged Out    Hib vaccine  Aged Out    Polio vaccine  Aged Out    Meningococcal (ACWY) vaccine  Aged Out    Meningococcal B vaccine  Aged Out    Pneumococcal 0-49 years Vaccine  Discontinued

## 2025-05-09 NOTE — PROGRESS NOTES
Chief Complaint   Patient presents with    Hypertension    Hyperlipidemia    Knee Pain     Left - d-dimer was elevated      The patient (or guardian, if applicable) and other individuals in attendance with the patient were advised that Artificial Intelligence will be utilized during this visit to record, process the conversation to generate a clinical note, and support improvement of the AI technology. The patient (or guardian, if applicable) and other individuals in attendance at the appointment consented to the use of AI, including the recording.                    Hypertension, Hyperlipidemia, and Knee Pain (Left - d-dimer was elevated )      History of Present Illness  The patient presents for a follow-up visit.    She reports a weight loss of approximately 20 pounds since 2023, with her highest weight being 150 pounds. She attributes the weight loss to the initiation of chemotherapy and other prescribed medications. Her current weight is 131 pounds, consistent with her weight during her last visit in January. She has not yet completed her blood work but plans to do so today.    She has been experiencing chronic diarrhea for several years, which she believes is due to her metformin medication. Despite being given a stool test kit, she has not completed the test due to the absence of regular bowel movements. She has previously undergone a colonoscopy and is due for a second one. A CT scan of the abdomen has been ordered by her gastroenterologist, but she requires authorization from her primary care provider.  Patient's weight is fairly stable since her last visit but she did had weight loss in the last 2 years.  Patient is currently on chemotherapy for JAK2 mutation.  She still smokes and is not up-to-date on CAT scan of the lungs.    Patient reports oncologist ordered colonoscopy but insurance is not covered and she needs approval from PCP.    Patient did have mild weight loss, in last 6 months.  Patient also

## 2025-05-13 ENCOUNTER — TELEPHONE (OUTPATIENT)
Dept: GASTROENTEROLOGY | Age: 60
End: 2025-05-13

## 2025-05-13 NOTE — TELEPHONE ENCOUNTER
Writer lvm for patient to call and schedule with Dr Shore to manage iron /anemia    Attempt 2 letter sent    attempt 3 lvm for pt to call and schedule -radha

## 2025-05-23 ENCOUNTER — TELEPHONE (OUTPATIENT)
Age: 60
End: 2025-05-23

## 2025-05-23 NOTE — TELEPHONE ENCOUNTER
**OFFICE CALLED PT TO REMIND PT OF APPT & CT, VM FULL**     Electronically signed by Junie Boateng on 5/23/2025 at 10:44 AM